# Patient Record
Sex: FEMALE | Race: WHITE | NOT HISPANIC OR LATINO
[De-identification: names, ages, dates, MRNs, and addresses within clinical notes are randomized per-mention and may not be internally consistent; named-entity substitution may affect disease eponyms.]

---

## 2017-05-16 ENCOUNTER — APPOINTMENT (OUTPATIENT)
Dept: CT IMAGING | Facility: CLINIC | Age: 77
End: 2017-05-16

## 2017-05-16 ENCOUNTER — OUTPATIENT (OUTPATIENT)
Dept: OUTPATIENT SERVICES | Facility: HOSPITAL | Age: 77
LOS: 1 days | End: 2017-05-16
Payer: MEDICARE

## 2017-05-16 DIAGNOSIS — Z00.8 ENCOUNTER FOR OTHER GENERAL EXAMINATION: ICD-10-CM

## 2017-05-16 PROCEDURE — 71250 CT THORAX DX C-: CPT

## 2017-07-31 ENCOUNTER — OUTPATIENT (OUTPATIENT)
Dept: OUTPATIENT SERVICES | Facility: HOSPITAL | Age: 77
LOS: 1 days | End: 2017-07-31
Payer: MEDICARE

## 2017-07-31 ENCOUNTER — APPOINTMENT (OUTPATIENT)
Dept: CT IMAGING | Facility: CLINIC | Age: 77
End: 2017-07-31
Payer: MEDICARE

## 2017-07-31 DIAGNOSIS — R93.8 ABNORMAL FINDINGS ON DIAGNOSTIC IMAGING OF OTHER SPECIFIED BODY STRUCTURES: ICD-10-CM

## 2017-07-31 PROCEDURE — 71250 CT THORAX DX C-: CPT

## 2017-07-31 PROCEDURE — 71250 CT THORAX DX C-: CPT | Mod: 26

## 2018-05-29 ENCOUNTER — APPOINTMENT (OUTPATIENT)
Dept: CT IMAGING | Facility: CLINIC | Age: 78
End: 2018-05-29
Payer: MEDICARE

## 2018-05-29 ENCOUNTER — OUTPATIENT (OUTPATIENT)
Dept: OUTPATIENT SERVICES | Facility: HOSPITAL | Age: 78
LOS: 1 days | End: 2018-05-29
Payer: MEDICARE

## 2018-05-29 ENCOUNTER — APPOINTMENT (OUTPATIENT)
Dept: RADIOLOGY | Facility: CLINIC | Age: 78
End: 2018-05-29
Payer: MEDICARE

## 2018-05-29 DIAGNOSIS — Z00.8 ENCOUNTER FOR OTHER GENERAL EXAMINATION: ICD-10-CM

## 2018-05-29 PROCEDURE — 71250 CT THORAX DX C-: CPT

## 2018-05-29 PROCEDURE — 71250 CT THORAX DX C-: CPT | Mod: 26

## 2018-10-24 ENCOUNTER — TRANSCRIPTION ENCOUNTER (OUTPATIENT)
Age: 78
End: 2018-10-24

## 2018-12-23 ENCOUNTER — TRANSCRIPTION ENCOUNTER (OUTPATIENT)
Age: 78
End: 2018-12-23

## 2019-01-23 ENCOUNTER — OUTPATIENT (OUTPATIENT)
Dept: OUTPATIENT SERVICES | Facility: HOSPITAL | Age: 79
LOS: 1 days | End: 2019-01-23
Payer: MEDICARE

## 2019-01-23 ENCOUNTER — APPOINTMENT (OUTPATIENT)
Dept: RADIOLOGY | Facility: IMAGING CENTER | Age: 79
End: 2019-01-23
Payer: MEDICARE

## 2019-01-23 DIAGNOSIS — Z00.8 ENCOUNTER FOR OTHER GENERAL EXAMINATION: ICD-10-CM

## 2019-01-23 PROCEDURE — 73130 X-RAY EXAM OF HAND: CPT | Mod: 26,RT

## 2019-01-23 PROCEDURE — 73130 X-RAY EXAM OF HAND: CPT

## 2019-05-24 ENCOUNTER — APPOINTMENT (OUTPATIENT)
Dept: CT IMAGING | Facility: CLINIC | Age: 79
End: 2019-05-24
Payer: MEDICARE

## 2019-05-24 ENCOUNTER — OUTPATIENT (OUTPATIENT)
Dept: OUTPATIENT SERVICES | Facility: HOSPITAL | Age: 79
LOS: 1 days | End: 2019-05-24
Payer: MEDICARE

## 2019-05-24 DIAGNOSIS — Z00.8 ENCOUNTER FOR OTHER GENERAL EXAMINATION: ICD-10-CM

## 2019-05-24 PROCEDURE — 71250 CT THORAX DX C-: CPT | Mod: 26

## 2019-05-24 PROCEDURE — 71250 CT THORAX DX C-: CPT

## 2019-07-01 ENCOUNTER — TRANSCRIPTION ENCOUNTER (OUTPATIENT)
Age: 79
End: 2019-07-01

## 2020-06-06 ENCOUNTER — OUTPATIENT (OUTPATIENT)
Dept: OUTPATIENT SERVICES | Facility: HOSPITAL | Age: 80
LOS: 1 days | End: 2020-06-06
Payer: MEDICARE

## 2020-06-06 ENCOUNTER — APPOINTMENT (OUTPATIENT)
Dept: CT IMAGING | Facility: CLINIC | Age: 80
End: 2020-06-06
Payer: MEDICARE

## 2020-06-06 DIAGNOSIS — R91.1 SOLITARY PULMONARY NODULE: ICD-10-CM

## 2020-06-06 PROCEDURE — 71250 CT THORAX DX C-: CPT | Mod: 26

## 2020-06-06 PROCEDURE — 71250 CT THORAX DX C-: CPT

## 2021-11-19 ENCOUNTER — APPOINTMENT (OUTPATIENT)
Dept: WOUND CARE | Facility: CLINIC | Age: 81
End: 2021-11-19
Payer: MEDICARE

## 2021-11-19 VITALS — TEMPERATURE: 96.1 F | BODY MASS INDEX: 26.84 KG/M2 | HEIGHT: 66 IN | WEIGHT: 167 LBS

## 2021-11-19 DIAGNOSIS — E03.9 HYPOTHYROIDISM, UNSPECIFIED: ICD-10-CM

## 2021-11-19 DIAGNOSIS — L97.512 NON-PRESSURE CHRONIC ULCER OF OTHER PART OF RIGHT FOOT WITH FAT LAYER EXPOSED: ICD-10-CM

## 2021-11-19 DIAGNOSIS — Z87.891 PERSONAL HISTORY OF NICOTINE DEPENDENCE: ICD-10-CM

## 2021-11-19 PROCEDURE — 99203 OFFICE O/P NEW LOW 30 MIN: CPT

## 2021-11-19 RX ORDER — LEVOTHYROXINE SODIUM 0.09 MG/1
88 TABLET ORAL
Refills: 0 | Status: ACTIVE | COMMUNITY

## 2021-11-19 RX ORDER — SIMVASTATIN 20 MG/1
20 TABLET, FILM COATED ORAL
Refills: 0 | Status: ACTIVE | COMMUNITY

## 2021-11-19 RX ORDER — NIFEDIPINE 60 MG/1
60 TABLET, FILM COATED, EXTENDED RELEASE ORAL
Refills: 0 | Status: ACTIVE | COMMUNITY

## 2021-11-19 RX ORDER — ASPIRIN 81 MG
81 TABLET,CHEWABLE ORAL
Refills: 0 | Status: ACTIVE | COMMUNITY

## 2021-11-19 NOTE — REASON FOR VISIT
[Consultation] : a consultation visit [Family Member] : family member [FreeTextEntry1] : Right Great Toe

## 2021-11-19 NOTE — ASSESSMENT
[FreeTextEntry1] : Wound Assessment \par The patient presents with a wound to the Right Great toe. Swelling and redness noted to the right big toe\par Ischemic appearance noted\par Patient told to continue with aquacell ag right hallux daily\par keep right foot warm\par recommend patient follow up with vascular physician for possible revascularization options\par small vessel disease and PVD\par patient not a candidate for HBO\par continue daily wound care\par high risk for gangrene and possible amputation right foot\par No debridement recommended since it could cause wound to get worse and possible infection\par No clinical sign of infection-no need for oral antibiosis- antibiotics may not reach toe quiñones to PVD\par Patient told to go directly to ED if any signs of infection or cellulitis\par return 4 weeks or PRN\par

## 2021-11-19 NOTE — HISTORY OF PRESENT ILLNESS
[FreeTextEntry1] : Ms. AGATHA COLE presents to the office with a wound for 1 year duration. The wound is located on the Right Toe Patient has hx of C.R.E.S.T and has been following Vascular  over at [facility] and Dr. Hasy for podiatry. .The patient has complaints of Pain and discomfort. The patient has been dressing the wound with Aquacel.The patient denies fevers or chills. The patient has localized pain to the wound upon dressing changes. The patient has no other complaints or associated symptoms. Ischemic appearing toe with bluish discoloration right hallux. no cap fill time noted at distal right hallux\par DP and PT non-palpable right foot +PVD right foot\par DP and PT 1/4 left foot

## 2021-11-19 NOTE — PLAN
[FreeTextEntry1] : Wound  Plan:\par \par Apply lidocaine or topical anesthetic if needed to reduce pain upon washing the wound.\par Wash wound with - Dove skin sensitive soap and clean water \par Apply ----Aquacel Ag & dsd \par Change dressing: 3 times per week.\par Encouraged ambulation or exercise as tolerated.\par Optimization of nutrition.\par Patient encourage to dangle legs to encourage blood flow to toe due to suspicions of ischemia \par Patient recommended to follow up with Vascular \par Follow up appointment scheduled for:\par

## 2023-02-02 ENCOUNTER — APPOINTMENT (OUTPATIENT)
Dept: PHYSICAL MEDICINE AND REHAB | Facility: CLINIC | Age: 83
End: 2023-02-02
Payer: MEDICARE

## 2023-02-02 VITALS
HEART RATE: 66 BPM | SYSTOLIC BLOOD PRESSURE: 110 MMHG | OXYGEN SATURATION: 99 % | TEMPERATURE: 96.5 F | DIASTOLIC BLOOD PRESSURE: 63 MMHG

## 2023-02-02 PROCEDURE — 99204 OFFICE O/P NEW MOD 45 MIN: CPT

## 2023-02-02 RX ORDER — HYDROCHLOROTHIAZIDE 12.5 MG/1
TABLET ORAL
Refills: 0 | Status: ACTIVE | COMMUNITY

## 2023-02-02 RX ORDER — OMEPRAZOLE 40 MG/1
40 CAPSULE, DELAYED RELEASE ORAL
Refills: 0 | Status: ACTIVE | COMMUNITY

## 2023-02-02 RX ORDER — SIMVASTATIN 20 MG/1
20 TABLET, FILM COATED ORAL
Refills: 0 | Status: DISCONTINUED | COMMUNITY
End: 2023-02-02

## 2023-02-02 RX ORDER — METOPROLOL SUCCINATE 25 MG/1
25 TABLET, EXTENDED RELEASE ORAL
Refills: 0 | Status: ACTIVE | COMMUNITY

## 2023-02-13 NOTE — ASSESSMENT
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  In addition the patient's silicone liners are stretched and worn and require replacement.  The patient's prosthetic socks afraid and required replacement as well.  Patient is a K2 ambulator.  Prescription provided for a right custom molded BK socket replacement with a total contact acrylic socket, flexible inner socket, test socket, suction suspension with silicone liner, sealing sleeve, ultralight alignable components, outer protective cover, 6 multiple ply socks, 6 single ply socks.  Prescription provided for outpatient physical therapy 2-3 times per week, see Rx.\par \par I spent a total of 45 minutes on the date of the encounter evaluating and treating the patient including a discussion of treatment options.

## 2023-02-13 NOTE — HISTORY OF PRESENT ILLNESS
[FreeTextEntry1] : Patient is an 82-year-old female history of CREST Syndrome, PVD status post right BKA (6/2022) secondary to PVD and presents today for a prosthetic evaluation.  Patient received her current prosthesis in September, 2022.  Patient's main complaint is that the prosthetic socket is loose/heavy resulting in instability at the knee.  Patient has noted mild rotation of the prosthesis during ambulation.  No pain complaints, no skin breakdown.  Patient has noted redness at the residual limb after ambulation.  No falls reported.  Weight stable.  Patient currently using 6 ply socks but padding has been added to the socket.  Patient is receiving outpatient physical therapy.  Premorbidly the patient was independent ambulator without assistive device, independent driving.  Currently the patient ambulates independently with a rolling walker in the community.  Patient is able to negotiate all environmental barriers such as curbs and ramps.  Patient is independent transfers, independent toileting and independent dressing.  Patient lives alone in a private house with a 2 step entry.  Home health aide is present.  Patient referred from [facility]

## 2023-02-13 NOTE — REVIEW OF SYSTEMS
[Patient Intake Form Reviewed] : Patient intake form was reviewed [Difficulty Walking] : difficulty walking [Negative] : Gastrointestinal [Hearing Loss] : loss of hearing [Fever] : no fever [Recent Change In Weight] : ~T no recent weight change [Incontinence] : no incontinence [Muscle Weakness] : no muscle weakness [Skin Wound] : no skin wound

## 2023-02-13 NOTE — PHYSICAL EXAM
[FreeTextEntry1] : General: Well-developed female in no apparent distress.  Patient is awake, alert, and follows commands.  Cooperative.\par HEENT: Normocephalic, atraumatic.  MMM.\par Lungs: Clear to auscultation.\par Cardiac: Regular rate and rhythm.\par Abdomen: Bowel sounds present, nondistended.\par Extremities: Sclerodactyly noted of the fingers, left digit 3 dressed due to recent distal amputation.  No pedal edema noted on the left.\par \par Right BKA: Cylindrical shaped, well-healed, no skin adhesion.  Hyperpigmentation noted over the anterior/distal tibia, no erythema or warmth.  No tenderness to palpation.  Full active range of motion noted at the knee.\par MTP to FE- 5 inches; MTP to BE- 4-1/4 inches.\par \par Motor:\par Both upper extremities: Tone normal, active range of motion within functional limits with 5/5 motor power throughout.  Thumb to digit opposition to digit 5 impaired bilaterally\par Both lower extremities: Tone normal active range of motion within functional limits with 5/5 motor power throughout.\par \par Sensory: Intact to light touch in both lower extremities\par Muscle stretch reflexes 0/+1 KJ and symmetric.\par \par Functional status: Sit to stand transfer independent.  Patient ambulated independently with a rolling walker and right BK prosthesis with suction suspension, 6 ply socks with significant medial thrusting noted.  Posterior padding present in the socket.  Patient is a K2 ambulator.

## 2023-05-31 ENCOUNTER — APPOINTMENT (OUTPATIENT)
Dept: PHYSICAL MEDICINE AND REHAB | Facility: CLINIC | Age: 83
End: 2023-05-31

## 2023-07-03 ENCOUNTER — APPOINTMENT (OUTPATIENT)
Dept: PHYSICAL MEDICINE AND REHAB | Facility: CLINIC | Age: 83
End: 2023-07-03
Payer: MEDICARE

## 2023-07-03 VITALS — HEART RATE: 65 BPM | OXYGEN SATURATION: 95 % | SYSTOLIC BLOOD PRESSURE: 102 MMHG | DIASTOLIC BLOOD PRESSURE: 63 MMHG

## 2023-07-03 PROCEDURE — 99213 OFFICE O/P EST LOW 20 MIN: CPT

## 2023-07-10 NOTE — HISTORY OF PRESENT ILLNESS
[FreeTextEntry1] : Patient is an 82-year-old female history of CREST Syndrome, PVD status post right BKA (6/2022) secondary to PVD and presents today for a prosthetic evaluation.  Patient had a socket replacement in February, 2023 and patient reports main complaint is that the socket has become significantly too large.  Patient complains of instability at the knee and has reported episodic rotation of the prosthesis.  Patient denies pain with ambulation or skin breakdown.  Patient has been wearing 2-3 prosthetic socks and reports that the  has revised the socket.  No falls reported.  Weight has been stable.  Functionally the patient is ambulating independently with a rolling walker in the community.  Patient is able to negotiate all environmental barriers such as curbs and ramps.  Patient is independent transfers, independent toileting and independent dressing.  Patient is working on ambulation with a straight axis cane in physical therapy.  Patient lives alone in a private house with a 2 step entry.  No longer has a home health aide.  Patient referred from [facility]

## 2023-07-10 NOTE — ASSESSMENT
[FreeTextEntry1] : Patient is an 82-year-old female history of CREST Syndrome, PVD s/p right BKA (6/2022) whose current prosthetic socket is significantly too large causing instability and episodic rotation of the prosthesis limiting ambulation.  In addition the patient's silicone liners are stretched and worn, her prosthetic socks are frayed and require replacement. The patient has advanced from K2-K3 and requires new componentry to increase stability. She is now able to navigate variable terrain with a variable quentin and is cognitively intact motivated to maintain ambulation. Patient requires a carbon fiber, energy storing, multiaxial dynamic response foot to provide increased medial and lateral stability. The patient’s current foot is unable to assist with K3-level ambulation with his current componentry. The foot should include a dynamic response toe component to allow for increased stability. She demonstrates both the physical ability and medical necessity consistent with Medicare K3 community ambulator. The patient’s prosthesis must include a combination of gel socket insert and flexible inner socket/rigid frame design prosthesis (so that the rigid frame can be fenestrated and the flexible inner socket heated and pushed through the fenestrations in the frame) to relieve these bony prominences and permit the patient to walk farther and for longer periods of time without skin breakdown or excessive pressures. Her prosthesis will require a protective cover to prevent injury to the contralateral leg. The rigid frame of the prosthesis must be constructed using ultralight materials (carbon fiber) laminated with acrylic resin.  This construction (method and materials) will provide increased strength to the external frame, as the socket will be fenestrated to reduce pressure on the residual limb.  Patient is a K3 ambulator.  Prescription provided for a right custom molded endoskeletal BK prosthesis with a total contact acrylic socket, flexible inner socket, test socket, silicone locking liner with locking mechanism, ultralight alignable components, K3 prosthetic foot, outer protective cover, 6 multiple ply socks, 6 single ply socks.  Patient to continue outpatient physical therapy.  \par \par I spent a total of 20 minutes on the date of the encounter evaluating and treating the patient including a discussion of treatment options.

## 2023-07-10 NOTE — REVIEW OF SYSTEMS
[Hearing Loss] : loss of hearing [Difficulty Walking] : difficulty walking [Negative] : Gastrointestinal [Fever] : no fever [Recent Change In Weight] : ~T no recent weight change [Incontinence] : no incontinence [Muscle Weakness] : no muscle weakness [Skin Wound] : no skin wound

## 2023-07-10 NOTE — PHYSICAL EXAM
[FreeTextEntry1] : General: Well-developed female in no apparent distress.  Patient is awake, alert, and follows commands.  Cooperative.\par HEENT: Normocephalic, atraumatic.  MMM.\par Extremities: Sclerodactyly noted of the fingers, left digit 3 distal amputation.  Left foot is dressed and patient being followed by wound care service.  Declined removal of dressing.  \par \par Right BKA: Conical shape, well-healed, no skin adhesion.  Hyperpigmentation noted over the anterior/distal tibia, no erythema or warmth.  No tenderness to palpation.  Full active range of motion noted at the knee.\par MTP to FE- 5 inches; MTP to BE- 4-1/4 inches.\par \par Motor:\par Both upper extremities: Tone normal, active range of motion within functional limits with 5/5 motor power throughout.  Thumb to digit opposition to digit 5 impaired bilaterally\par Both lower extremities: Tone normal active range of motion within functional limits with 5/5 motor power throughout.\par \par Sensory: Intact to light touch in both lower extremities\par Muscle stretch reflexes 0/+1 KJ and symmetric.\par \par Functional status: Sit to stand transfer independent.  Patient ambulated independently with a rolling walker and right BK prosthesis with silicone locking liner, 9 ply socks with min medial thrusting noted. Patient is a K3 ambulator.

## 2023-08-21 ENCOUNTER — APPOINTMENT (OUTPATIENT)
Dept: WOUND CARE | Facility: HOSPITAL | Age: 83
End: 2023-08-21
Payer: MEDICARE

## 2023-08-21 ENCOUNTER — OUTPATIENT (OUTPATIENT)
Dept: OUTPATIENT SERVICES | Facility: HOSPITAL | Age: 83
LOS: 1 days | End: 2023-08-21
Payer: MEDICARE

## 2023-08-21 VITALS
OXYGEN SATURATION: 93 % | BODY MASS INDEX: 26.52 KG/M2 | HEIGHT: 66 IN | DIASTOLIC BLOOD PRESSURE: 72 MMHG | WEIGHT: 165 LBS | HEART RATE: 60 BPM | TEMPERATURE: 97.3 F | SYSTOLIC BLOOD PRESSURE: 115 MMHG | RESPIRATION RATE: 16 BRPM

## 2023-08-21 PROCEDURE — G0463: CPT

## 2023-08-21 PROCEDURE — 99205 OFFICE O/P NEW HI 60 MIN: CPT

## 2023-08-21 PROCEDURE — 99213 OFFICE O/P EST LOW 20 MIN: CPT

## 2023-08-21 RX ORDER — COLLAGENASE SANTYL 250 [ARB'U]/G
250 OINTMENT TOPICAL DAILY
Qty: 1 | Refills: 3 | Status: ACTIVE | COMMUNITY
Start: 2023-08-21 | End: 1900-01-01

## 2023-08-21 NOTE — PHYSICAL EXAM
[0] : left 0 [Ankle Swelling (On Exam)] : present [Ankle Swelling On The Left] : moderate [Please See PDF for Tissue Analytics] : Please See PDF for Tissue Analytics. [FreeTextEntry1] : EDWARD SLAUGHTER

## 2023-08-21 NOTE — PHYSICAL EXAM
[Ankle Swelling (On Exam)] : present [Ankle Swelling On The Left] : moderate [Alert] : alert [Oriented to Person] : oriented to person [Oriented to Place] : oriented to place [Oriented to Time] : oriented to time [Calm] : calm [de-identified] : nad [de-identified] : non traumatic [de-identified] : supple [de-identified] : equal chest rise [de-identified] : soft [de-identified] :  SAMUEL, wheel chair bound  [de-identified] : Left foot wound dressed (just seen by podiatry today) [FreeTextEntry1] : Patient was evaluated for hyperbaric treatment. Patient previously evaluated in 2021 for HBO therapy and was not deemed a candidate for treatment at that time. Will need notes and pathology from referring vascular physician for diagnosis related to HBO treatment. Patient's H and P was reviewed, and patient was given an orientation of the HBO suite and treatment schedule. Patient was educated on the risks and the benefits of the treatment. Patient read and signed consent prior to the initiation of any screening procedure. Patient had the opportunity to discuss any questions regarding HBO therapy. Writer witnessed the signing of the consent, and the patient was given a copy of the signed consent. Patient had a recent chest X-ray and will send results to this office.

## 2023-08-21 NOTE — ASSESSMENT
[Dressing changes] : dressing changes [Foot Care] : foot care [Signs and symptoms of infection] : sign and symptoms of infection [Arterial Disease] : arterial disease [Hyperbaric Therapy] : hyperbaric therapy [FreeTextEntry1] : 82F with s/p L foot P1RR open from [facility] 2 weeks ago  - Pt presents with her son for evaluation of L foot wound  - Referral by Dr. Tello  - L foot open P1RR wound with fibro necrotic base with probe to bone, no erythema, no malodor, serous drainage, measures [id number] - L foot 2nd digit with delayed CFT 2/2 to PAD  - R BKA  - Pt not candidate for bypass 2/2 to no target vessels  - Recommend evaluation for Hyperbaric oxygen today (11am) - Continue local wound care with Santyl followed by 4x4, ABD pads, and hope  - Updated home nursing forms with Santyl  - RTC in 2 weeks

## 2023-08-21 NOTE — REASON FOR VISIT
[Consultation] : a consultation visit [Family Member] : family member [FreeTextEntry1] : Hyperbaric Therapy Treatments

## 2023-08-21 NOTE — HISTORY OF PRESENT ILLNESS
[FreeTextEntry1] : Patient is an 82-year-old female accompanied by her son for evaluation as a candidate for Hyperbaric Oxygen Therapy. Patient has a PMH of R BKA, scleroderma, and CREST presenting for L foot P1RR open wound. Patient was admitted to [facility] 2 weeks ago on 8/10/23, where she had an open P1RR done for gangrene of the hallux done by Dr. Naik.  Home care RN providing dressing changes three times a week.

## 2023-08-21 NOTE — REVIEW OF SYSTEMS
[Limb Swelling] : limb swelling [Skin Wound] : skin wound [Negative] : Psychiatric [FreeTextEntry6] : Equal chest rise [FreeTextEntry9] : SAMUEL Wheelchair bound [de-identified] : Left great toe amputation

## 2023-08-21 NOTE — HISTORY OF PRESENT ILLNESS
[FreeTextEntry1] : 82F with PMH of R BKA, scleroderma, and CREST presenting with her son for L foot P1RR open wound. Patient was admitted to [facility] 2 weeks ago where she had an open P1RR done for gangrene of the hallux done by Dr. Tello. Pt presents today for non healing of the L foot wound. Home nursing applies wet to dries to her L foot, pt otherwise denies any N/V/C/F/SOB.

## 2023-09-07 ENCOUNTER — APPOINTMENT (OUTPATIENT)
Dept: WOUND CARE | Facility: HOSPITAL | Age: 83
End: 2023-09-07
Payer: MEDICARE

## 2023-09-07 ENCOUNTER — APPOINTMENT (OUTPATIENT)
Dept: WOUND CARE | Facility: CLINIC | Age: 83
End: 2023-09-07

## 2023-09-07 PROCEDURE — 99213 OFFICE O/P EST LOW 20 MIN: CPT

## 2023-09-07 RX ORDER — SODIUM HYPOCHLORITE 2.5 MG/ML
0.25 SOLUTION TOPICAL
Qty: 1 | Refills: 0 | Status: ACTIVE | COMMUNITY
Start: 2023-09-07 | End: 1900-01-01

## 2023-09-07 RX ORDER — CLINDAMYCIN HYDROCHLORIDE 300 MG/1
300 CAPSULE ORAL EVERY 6 HOURS
Qty: 56 | Refills: 0 | Status: ACTIVE | COMMUNITY
Start: 2023-09-07 | End: 1900-01-01

## 2023-09-07 RX ORDER — CIPROFLOXACIN HYDROCHLORIDE 500 MG/1
500 TABLET, FILM COATED ORAL TWICE DAILY
Qty: 28 | Refills: 0 | Status: ACTIVE | COMMUNITY
Start: 2023-09-07 | End: 1900-01-01

## 2023-09-07 NOTE — HISTORY OF PRESENT ILLNESS
[FreeTextEntry1] : 82F with PMH of R BKA, scleroderma, and CREST presenting with her son for L foot P1RR open wound. Patient was admitted to [facility] 2 weeks ago where she had an open P1RR done for gangrene of the hallux done by Dr. Tello. Pt presents today for non healing of the L foot wound. Pt applies santyl to the left foot wound by VNS 3x per week. Pt was taking ABx but doesn't remember the name and finished them this past Friday.  pt otherwise denies any N/V/C/F/SOB.

## 2023-09-07 NOTE — ASSESSMENT
[Dressing changes] : dressing changes [Foot Care] : foot care [Signs and symptoms of infection] : sign and symptoms of infection [Arterial Disease] : arterial disease [Hyperbaric Therapy] : hyperbaric therapy [FreeTextEntry1] : 82F with s/p L foot P1RR open from [facility] 2 weeks ago  - Pt presents with her son for evaluation of L foot wound  - L foot open P1RR wound with fibrotic wound base with probe to bone and muscle exposed, moderate malodor, no purulent drainage, no erythema, no malodor, serous drainage - Excisional debridement of left foot wound with sterile suture removal kit to the level of subQ and not beyond subQ. Pt tolerated procedure well.  - Pt not candidate for bypass 2/2 to no target vessels  - Pt undergoing evaluation for HBOT - Left foot wound culture taken  - Rx: Clindamycin PO and Cipro PO X 14 - Dakin 0.25% solution ordered - Wound care 0.25% dakin soaked guaze followed by DSD, DAILY.  - RTC in 2 weeks

## 2023-09-10 LAB — BACTERIA SPEC CULT: ABNORMAL

## 2023-09-14 DIAGNOSIS — L97.522 NON-PRESSURE CHRONIC ULCER OF OTHER PART OF LEFT FOOT WITH FAT LAYER EXPOSED: ICD-10-CM

## 2023-09-14 DIAGNOSIS — I73.9 PERIPHERAL VASCULAR DISEASE, UNSPECIFIED: ICD-10-CM

## 2023-09-14 DIAGNOSIS — Z89.422 ACQUIRED ABSENCE OF OTHER LEFT TOE(S): ICD-10-CM

## 2023-09-14 DIAGNOSIS — S88.111D COMPLETE TRAUMATIC AMPUTATION AT LEVEL BETWEEN KNEE AND ANKLE, RIGHT LOWER LEG, SUBSEQUENT ENCOUNTER: ICD-10-CM

## 2023-09-18 ENCOUNTER — APPOINTMENT (OUTPATIENT)
Dept: WOUND CARE | Facility: HOSPITAL | Age: 83
End: 2023-09-18

## 2023-09-21 ENCOUNTER — APPOINTMENT (OUTPATIENT)
Dept: WOUND CARE | Facility: CLINIC | Age: 83
End: 2023-09-21
Payer: MEDICARE

## 2023-09-21 ENCOUNTER — OUTPATIENT (OUTPATIENT)
Dept: OUTPATIENT SERVICES | Facility: HOSPITAL | Age: 83
LOS: 1 days | End: 2023-09-21
Payer: MEDICARE

## 2023-09-21 PROCEDURE — 11042 DBRDMT SUBQ TIS 1ST 20SQCM/<: CPT

## 2023-09-22 DIAGNOSIS — M34.1 CR(E)ST SYNDROME: ICD-10-CM

## 2023-09-22 DIAGNOSIS — L97.522 NON-PRESSURE CHRONIC ULCER OF OTHER PART OF LEFT FOOT WITH FAT LAYER EXPOSED: ICD-10-CM

## 2023-09-22 DIAGNOSIS — I73.9 PERIPHERAL VASCULAR DISEASE, UNSPECIFIED: ICD-10-CM

## 2023-09-22 DIAGNOSIS — Z89.422 ACQUIRED ABSENCE OF OTHER LEFT TOE(S): ICD-10-CM

## 2023-10-02 ENCOUNTER — APPOINTMENT (OUTPATIENT)
Dept: WOUND CARE | Facility: HOSPITAL | Age: 83
End: 2023-10-02
Payer: MEDICARE

## 2023-10-02 ENCOUNTER — OUTPATIENT (OUTPATIENT)
Dept: OUTPATIENT SERVICES | Facility: HOSPITAL | Age: 83
LOS: 1 days | End: 2023-10-02
Payer: MEDICARE

## 2023-10-02 PROCEDURE — 99213 OFFICE O/P EST LOW 20 MIN: CPT

## 2023-10-02 PROCEDURE — G0463: CPT

## 2023-10-04 RX ORDER — COLLAGENASE SANTYL 250 [ARB'U]/G
250 OINTMENT TOPICAL DAILY
Qty: 1 | Refills: 3 | Status: ACTIVE | COMMUNITY
Start: 2023-10-02 | End: 1900-01-01

## 2023-10-05 ENCOUNTER — NON-APPOINTMENT (OUTPATIENT)
Age: 83
End: 2023-10-05

## 2023-10-23 ENCOUNTER — APPOINTMENT (OUTPATIENT)
Dept: WOUND CARE | Facility: HOSPITAL | Age: 83
End: 2023-10-23
Payer: MEDICARE

## 2023-10-23 ENCOUNTER — OUTPATIENT (OUTPATIENT)
Dept: OUTPATIENT SERVICES | Facility: HOSPITAL | Age: 83
LOS: 1 days | End: 2023-10-23
Payer: MEDICARE

## 2023-10-23 VITALS — TEMPERATURE: 97.4 F | WEIGHT: 165 LBS | BODY MASS INDEX: 26.52 KG/M2 | HEIGHT: 66 IN

## 2023-10-23 PROCEDURE — 11042 DBRDMT SUBQ TIS 1ST 20SQCM/<: CPT

## 2023-10-30 DIAGNOSIS — L97.522 NON-PRESSURE CHRONIC ULCER OF OTHER PART OF LEFT FOOT WITH FAT LAYER EXPOSED: ICD-10-CM

## 2023-10-30 DIAGNOSIS — I73.9 PERIPHERAL VASCULAR DISEASE, UNSPECIFIED: ICD-10-CM

## 2023-10-30 DIAGNOSIS — M34.1 CR(E)ST SYNDROME: ICD-10-CM

## 2023-10-30 DIAGNOSIS — S88.111D COMPLETE TRAUMATIC AMPUTATION AT LEVEL BETWEEN KNEE AND ANKLE, RIGHT LOWER LEG, SUBSEQUENT ENCOUNTER: ICD-10-CM

## 2023-10-30 DIAGNOSIS — Z89.422 ACQUIRED ABSENCE OF OTHER LEFT TOE(S): ICD-10-CM

## 2023-11-06 ENCOUNTER — OUTPATIENT (OUTPATIENT)
Dept: OUTPATIENT SERVICES | Facility: HOSPITAL | Age: 83
LOS: 1 days | End: 2023-11-06
Payer: MEDICARE

## 2023-11-06 ENCOUNTER — APPOINTMENT (OUTPATIENT)
Dept: WOUND CARE | Facility: HOSPITAL | Age: 83
End: 2023-11-06
Payer: MEDICARE

## 2023-11-06 VITALS — HEIGHT: 66 IN | WEIGHT: 165 LBS | TEMPERATURE: 97.6 F | BODY MASS INDEX: 26.52 KG/M2

## 2023-11-06 PROCEDURE — 11042 DBRDMT SUBQ TIS 1ST 20SQCM/<: CPT

## 2023-11-08 DIAGNOSIS — S88.111D COMPLETE TRAUMATIC AMPUTATION AT LEVEL BETWEEN KNEE AND ANKLE, RIGHT LOWER LEG, SUBSEQUENT ENCOUNTER: ICD-10-CM

## 2023-11-08 DIAGNOSIS — L97.522 NON-PRESSURE CHRONIC ULCER OF OTHER PART OF LEFT FOOT WITH FAT LAYER EXPOSED: ICD-10-CM

## 2023-11-08 DIAGNOSIS — Z89.422 ACQUIRED ABSENCE OF OTHER LEFT TOE(S): ICD-10-CM

## 2023-11-08 DIAGNOSIS — M34.1 CR(E)ST SYNDROME: ICD-10-CM

## 2023-11-13 DIAGNOSIS — I73.9 PERIPHERAL VASCULAR DISEASE, UNSPECIFIED: ICD-10-CM

## 2023-11-13 DIAGNOSIS — L97.522 NON-PRESSURE CHRONIC ULCER OF OTHER PART OF LEFT FOOT WITH FAT LAYER EXPOSED: ICD-10-CM

## 2023-11-13 DIAGNOSIS — Z89.422 ACQUIRED ABSENCE OF OTHER LEFT TOE(S): ICD-10-CM

## 2023-11-13 DIAGNOSIS — S88.111D COMPLETE TRAUMATIC AMPUTATION AT LEVEL BETWEEN KNEE AND ANKLE, RIGHT LOWER LEG, SUBSEQUENT ENCOUNTER: ICD-10-CM

## 2023-11-13 DIAGNOSIS — M34.1 CR(E)ST SYNDROME: ICD-10-CM

## 2023-11-20 ENCOUNTER — APPOINTMENT (OUTPATIENT)
Dept: WOUND CARE | Facility: HOSPITAL | Age: 83
End: 2023-11-20
Payer: MEDICARE

## 2023-11-20 ENCOUNTER — OUTPATIENT (OUTPATIENT)
Dept: OUTPATIENT SERVICES | Facility: HOSPITAL | Age: 83
LOS: 1 days | End: 2023-11-20
Payer: MEDICARE

## 2023-11-20 VITALS — RESPIRATION RATE: 16 BRPM | TEMPERATURE: 98 F | HEIGHT: 66 IN | WEIGHT: 165 LBS | BODY MASS INDEX: 26.52 KG/M2

## 2023-11-20 PROCEDURE — 11042 DBRDMT SUBQ TIS 1ST 20SQCM/<: CPT

## 2023-11-30 DIAGNOSIS — M34.1 CR(E)ST SYNDROME: ICD-10-CM

## 2023-11-30 DIAGNOSIS — I73.9 PERIPHERAL VASCULAR DISEASE, UNSPECIFIED: ICD-10-CM

## 2023-11-30 DIAGNOSIS — L97.522 NON-PRESSURE CHRONIC ULCER OF OTHER PART OF LEFT FOOT WITH FAT LAYER EXPOSED: ICD-10-CM

## 2023-12-04 ENCOUNTER — OUTPATIENT (OUTPATIENT)
Dept: OUTPATIENT SERVICES | Facility: HOSPITAL | Age: 83
LOS: 1 days | End: 2023-12-04
Payer: MEDICARE

## 2023-12-04 ENCOUNTER — APPOINTMENT (OUTPATIENT)
Dept: WOUND CARE | Facility: HOSPITAL | Age: 83
End: 2023-12-04
Payer: MEDICARE

## 2023-12-04 VITALS
BODY MASS INDEX: 26.52 KG/M2 | DIASTOLIC BLOOD PRESSURE: 68 MMHG | OXYGEN SATURATION: 95 % | TEMPERATURE: 98 F | HEIGHT: 66 IN | RESPIRATION RATE: 16 BRPM | SYSTOLIC BLOOD PRESSURE: 116 MMHG | WEIGHT: 165 LBS | HEART RATE: 62 BPM

## 2023-12-04 PROCEDURE — 11042 DBRDMT SUBQ TIS 1ST 20SQCM/<: CPT

## 2023-12-12 DIAGNOSIS — L97.522 NON-PRESSURE CHRONIC ULCER OF OTHER PART OF LEFT FOOT WITH FAT LAYER EXPOSED: ICD-10-CM

## 2023-12-12 DIAGNOSIS — Z89.422 ACQUIRED ABSENCE OF OTHER LEFT TOE(S): ICD-10-CM

## 2023-12-12 DIAGNOSIS — M34.1 CR(E)ST SYNDROME: ICD-10-CM

## 2023-12-12 DIAGNOSIS — I73.9 PERIPHERAL VASCULAR DISEASE, UNSPECIFIED: ICD-10-CM

## 2023-12-18 ENCOUNTER — OUTPATIENT (OUTPATIENT)
Dept: OUTPATIENT SERVICES | Facility: HOSPITAL | Age: 83
LOS: 1 days | End: 2023-12-18
Payer: MEDICARE

## 2023-12-18 ENCOUNTER — APPOINTMENT (OUTPATIENT)
Dept: WOUND CARE | Facility: HOSPITAL | Age: 83
End: 2023-12-18
Payer: MEDICARE

## 2023-12-18 VITALS
DIASTOLIC BLOOD PRESSURE: 80 MMHG | WEIGHT: 165 LBS | BODY MASS INDEX: 26.52 KG/M2 | TEMPERATURE: 97.4 F | SYSTOLIC BLOOD PRESSURE: 128 MMHG | OXYGEN SATURATION: 97 % | RESPIRATION RATE: 16 BRPM | HEART RATE: 68 BPM | HEIGHT: 66 IN

## 2023-12-18 PROCEDURE — 11042 DBRDMT SUBQ TIS 1ST 20SQCM/<: CPT

## 2023-12-18 NOTE — ASSESSMENT
[Dressing changes] : dressing changes [Foot Care] : foot care [Signs and symptoms of infection] : sign and symptoms of infection [Arterial Disease] : arterial disease [Hyperbaric Therapy] : hyperbaric therapy

## 2024-01-22 ENCOUNTER — APPOINTMENT (OUTPATIENT)
Dept: WOUND CARE | Facility: HOSPITAL | Age: 84
End: 2024-01-22
Payer: MEDICARE

## 2024-01-22 ENCOUNTER — OUTPATIENT (OUTPATIENT)
Dept: OUTPATIENT SERVICES | Facility: HOSPITAL | Age: 84
LOS: 1 days | End: 2024-01-22
Payer: MEDICARE

## 2024-01-22 PROCEDURE — G0463: CPT

## 2024-01-22 PROCEDURE — 99213 OFFICE O/P EST LOW 20 MIN: CPT

## 2024-01-22 NOTE — PLAN
[FreeTextEntry1] : 83F with s/p L foot P1RR open from [facility]  - Pt presents with her son for evaluation of L foot wound - L foot open P1RR wound with fibrogranular wound base , bone exposed, no malodor, no purulent drainage, no erythema, full thickness eschar with gangrenous changes  to the L dorsal distal 2nd digit, mild wet conversion with no malodor or erythema noted, fibortic changes to the dorsal 3rd digit wound, no drainage. New dorsal 4th digit wound to subQ, fibrotic wound bed.  - L foot wound debrided in excisional manner using sterile suture removal kit to the level of subQ and not beyond, pt tolerated procedure well at this time  - Cleansed wound with chlorhexidine and applied santyl followed by DSD. - Pt to continue oxygen boot therapy  - Recommend cleaning with acetic acid, rinsing with saline and applying Santyl daily.  - Given chronicity of the left foot wound and non healing nature of the wound. now with spread to the 3rd/4th digit digit as well, patient scheduled an appt w Dr. Henry @ [facility] tomorrow.  - Advised patient that she will benefit from L foot 2nd digit amputation with a dermal substitute to cover the wound area however, patient should discuss with the vascular Dr whether he wants to intervene surgically or whether we would schedule an oupt procedure for L foot 2nd digit amputation with graft application.  - RTC in 3 weeks

## 2024-01-22 NOTE — HISTORY OF PRESENT ILLNESS
[FreeTextEntry1] : 82F with PMH of R BKA, scleroderma, and CREST presenting with her son for L foot P1RR open wound (DOS Aug 2023).  Pt is currently on Amplodipine and was taken off the metoprolol by her cardiologist. Pt has been doing TW O2 at home 5 days per week. She was unable to get an appt at the wound care center 2/2 to holidays. pt otherwise denies any N/V/C/F/SOB.

## 2024-01-24 DIAGNOSIS — L97.522 NON-PRESSURE CHRONIC ULCER OF OTHER PART OF LEFT FOOT WITH FAT LAYER EXPOSED: ICD-10-CM

## 2024-01-24 DIAGNOSIS — Z89.422 ACQUIRED ABSENCE OF OTHER LEFT TOE(S): ICD-10-CM

## 2024-01-24 DIAGNOSIS — I73.9 PERIPHERAL VASCULAR DISEASE, UNSPECIFIED: ICD-10-CM

## 2024-01-24 DIAGNOSIS — M34.1 CR(E)ST SYNDROME: ICD-10-CM

## 2024-01-24 DIAGNOSIS — S88.111D COMPLETE TRAUMATIC AMPUTATION AT LEVEL BETWEEN KNEE AND ANKLE, RIGHT LOWER LEG, SUBSEQUENT ENCOUNTER: ICD-10-CM

## 2024-02-12 ENCOUNTER — APPOINTMENT (OUTPATIENT)
Dept: WOUND CARE | Facility: HOSPITAL | Age: 84
End: 2024-02-12
Payer: MEDICARE

## 2024-02-12 ENCOUNTER — OUTPATIENT (OUTPATIENT)
Dept: OUTPATIENT SERVICES | Facility: HOSPITAL | Age: 84
LOS: 1 days | End: 2024-02-12
Payer: MEDICARE

## 2024-02-12 PROCEDURE — 11044 DBRDMT BONE 1ST 20 SQ CM/<: CPT

## 2024-02-12 NOTE — PLAN
[FreeTextEntry1] : 83F with s/p L foot P1RR open from [facility]  - Pt presents with her son for evaluation of L foot wound - L foot open P1RR wound with fibrogranular wound base , bone exposed, no malodor, no purulent drainage, no erythema, full thickness eschar with gangrenous changes  to the L dorsal distal 2nd digit, mild wet conversion with no malodor or erythema noted, fibortic changes to the dorsal 3rd digit wound, no drainage. New dorsal 4th digit wound to subQ, fibrotic wound bed.  - L foot wound debrided in excisional manner using sterile suture removal kit to the level of subQ and not beyond, pt tolerated procedure well at this time  - Cleansed wound with chlorhexidine and applied santyl followed by DSD. - Pt to continue oxygen boot therapy on 03/1 - Recommend cleaning with acetic acid, rinsing with saline and applying Santyl daily.  - Advised patient that she will benefit from L foot 2nd digit amputation with a dermal substitute to cover the wound area however,  we would schedule an oupt procedure for L foot 2nd digit amputation with graft application.  - RTC PRN

## 2024-02-13 NOTE — PLAN
[FreeTextEntry1] : 83F with s/p L foot P1RR open from [facility]  - Pt presents with her son for evaluation of L foot wound - L foot open P1RR wound with fibrotic wound base with macerated edges, bone and muscle exposed, mild malodor, no purulent drainage, no erythema, full thickness eschar with gangrenous changes  to the L dorsal 2nd digit with well adhered edges, now with ischemic changes to the 3rd digit  - Consent obtained, L foot blocked with 5cc of 1% lidocaine plain in gottlieb block - L foot wound debrided in excisional manner using sterile suture removal kit to the level of subQ and not beyond, pt tolerated procedure well at this time  - Cleansed wound with vashe and applied santyl followed by DSD. - Pt to continue oxygen boot therapy  - Recommend cleaning with acetic acid, rinsing with saline and applying Santyl daily.  - Recommended L foot TMA due to the chronicity and non healing nature of the wound. now with spread to the 3rd digit as well, will contact Dr. Henry @ [facility]. Son is in agreement and will discuss plan with the patient.  - RTC in 3 weeks with possible admission to [facility].

## 2024-02-13 NOTE — HISTORY OF PRESENT ILLNESS
[FreeTextEntry1] : 82F with PMH of R BKA, scleroderma, and CREST presenting with her son for L foot P1RR open wound (DOS Aug 2023). Seen by her rheumatologist who is considering putting her on nifedipine for her Raynaud's but there is concern by her cardiologist who wants to keep her on the metoprolol, patient has appointment with her cardiologist on Thursday. Pt has been doing TW O2 at home 5 days per week. pt otherwise denies any N/V/C/F/SOB.

## 2024-02-20 DIAGNOSIS — I73.9 PERIPHERAL VASCULAR DISEASE, UNSPECIFIED: ICD-10-CM

## 2024-02-20 DIAGNOSIS — Z89.422 ACQUIRED ABSENCE OF OTHER LEFT TOE(S): ICD-10-CM

## 2024-02-20 DIAGNOSIS — M34.1 CR(E)ST SYNDROME: ICD-10-CM

## 2024-02-20 DIAGNOSIS — S88.111D COMPLETE TRAUMATIC AMPUTATION AT LEVEL BETWEEN KNEE AND ANKLE, RIGHT LOWER LEG, SUBSEQUENT ENCOUNTER: ICD-10-CM

## 2024-02-20 DIAGNOSIS — L97.522 NON-PRESSURE CHRONIC ULCER OF OTHER PART OF LEFT FOOT WITH FAT LAYER EXPOSED: ICD-10-CM

## 2024-02-21 LAB — CORE LAB BIOPSY: NORMAL

## 2024-02-23 ENCOUNTER — OUTPATIENT (OUTPATIENT)
Dept: OUTPATIENT SERVICES | Facility: HOSPITAL | Age: 84
LOS: 1 days | End: 2024-02-23

## 2024-02-23 VITALS
HEIGHT: 64 IN | RESPIRATION RATE: 18 BRPM | OXYGEN SATURATION: 98 % | WEIGHT: 145.06 LBS | TEMPERATURE: 98 F | DIASTOLIC BLOOD PRESSURE: 57 MMHG | SYSTOLIC BLOOD PRESSURE: 106 MMHG | HEART RATE: 69 BPM

## 2024-02-23 DIAGNOSIS — I96 GANGRENE, NOT ELSEWHERE CLASSIFIED: ICD-10-CM

## 2024-02-23 DIAGNOSIS — Z89.511 ACQUIRED ABSENCE OF RIGHT LEG BELOW KNEE: Chronic | ICD-10-CM

## 2024-02-23 DIAGNOSIS — Z98.890 OTHER SPECIFIED POSTPROCEDURAL STATES: Chronic | ICD-10-CM

## 2024-02-23 DIAGNOSIS — L97.524 NON-PRESSURE CHRONIC ULCER OF OTHER PART OF LEFT FOOT WITH NECROSIS OF BONE: ICD-10-CM

## 2024-02-23 DIAGNOSIS — Z89.421 ACQUIRED ABSENCE OF OTHER RIGHT TOE(S): Chronic | ICD-10-CM

## 2024-02-23 NOTE — H&P PST ADULT - NEUROLOGICAL SYMPTOMS
Uses a walker and wheelchair for transport- unsteady due to BKA and left foot wound./difficulty walking

## 2024-02-23 NOTE — H&P PST ADULT - HISTORY OF PRESENT ILLNESS
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 Pt is scheduled for Amputation of left foot 2nd digit with dermal skin application.  Patient has private RN who does daily left foot dressings. Denies foot pain, cp, sob, fevers.

## 2024-02-23 NOTE — H&P PST ADULT - NEUROLOGICAL
normal/sensation intact/responds to pain/responds to verbal commands/cranial nerves intact/no spontaneous movement details…

## 2024-02-23 NOTE — H&P PST ADULT - SKIN
Patient refused examination of left foot wound- dressing on Dry and intact./warm and dry/color normal/normal/no rashes W Plasty Text: The lesion was extirpated to the level of the fat with a #15 scalpel blade.  Given the location of the defect, shape of the defect and the proximity to free margins a W-plasty was deemed most appropriate for repair.  Using a sterile surgical marker, the appropriate transposition arms of the W-plasty were drawn incorporating the defect and placing the expected incisions within the relaxed skin tension lines where possible.    The area thus outlined was incised deep to adipose tissue with a #15 scalpel blade.  The skin margins were undermined to an appropriate distance in all directions utilizing iris scissors.  The opposing transposition arms were then transposed into place in opposite direction and anchored with interrupted buried subcutaneous sutures.

## 2024-02-23 NOTE — H&P PST ADULT - PROBLEM SELECTOR PLAN 1
Patient tentatively scheduled for  Amputation of left foot 2nd digit with dermal skin application on 3/5/24.  Pre-op instructions provided. Pt given verbal and written instructions with teach back on chlorhexidine shampoo. Pt verbalized understanding with return demonstration.   Patient instructed to take Omeprazole with a sip of water on the morning of procedure.     Copy of Labs-CBC, CMP in chart.  Copy of baseline EKG requested from Cardiologist.

## 2024-02-23 NOTE — H&P PST ADULT - MUSCULOSKELETAL
Hx of Right bka with prosthetic on. Uses a walker in the house and wheelchair for transport./no joint swelling/no joint erythema/no joint warmth/strength 5/5 bilateral upper extremities/abnormal gait details…

## 2024-02-23 NOTE — H&P PST ADULT - NEGATIVE ENMT SYMPTOMS
no hearing difficulty/no ear pain/no tinnitus/no vertigo/no sinus symptoms/no nasal congestion/no nose bleeds/no abnormal taste sensation/no gum bleeding/no dry mouth/no throat pain/no dysphagia no ear pain/no tinnitus/no vertigo/no sinus symptoms/no nasal congestion/no nose bleeds/no abnormal taste sensation/no gum bleeding/no dry mouth/no throat pain/no dysphagia

## 2024-02-23 NOTE — H&P PST ADULT - NSICDXPASTSURGICALHX_GEN_ALL_CORE_FT
PAST SURGICAL HISTORY:  H/O colonoscopy     S/P BKA (below knee amputation), right     S/P endoscopy     Status post amputation of toe of right foot

## 2024-02-23 NOTE — H&P PST ADULT - FUNCTIONAL STATUS
METS 3.3- DASI- patient has limited activities due to Hx of Right BKA and left foot wound. Uses walker and able to walk indoors with assistance. Uses wheelchair for transport./less than 4 METS METS 3.30- DASI- patient has limited activities due to Hx of Right BKA and left foot wound. Uses walker and able to walk indoors with assistance. Uses wheelchair for transport./less than 4 METS

## 2024-02-23 NOTE — H&P PST ADULT - NSICDXPASTMEDICALHX_GEN_ALL_CORE_FT
PAST MEDICAL HISTORY:  Chronic foot ulcer     Gangrene, not elsewhere classified     GIB (gastrointestinal bleeding)     History of CREST syndrome     HLD (hyperlipidemia)     [place] (hard of hearing)     Hypertension     Non-pressure chronic ulcer of other part of left foot with necrosis of bone     PVD (peripheral vascular disease)     Raynaud disease     Seasonal allergies

## 2024-02-23 NOTE — H&P PST ADULT - BLOOD AVOIDANCE/RESTRICTIONS, PROFILE
Problem: AMI: Day 3  Goal: Off Pathway (Use only if patient is Off Pathway)  Outcome: Progressing Towards Goal  Echo today none

## 2024-03-04 ENCOUNTER — TRANSCRIPTION ENCOUNTER (OUTPATIENT)
Age: 84
End: 2024-03-04

## 2024-03-05 ENCOUNTER — OUTPATIENT (OUTPATIENT)
Dept: OUTPATIENT SERVICES | Facility: HOSPITAL | Age: 84
LOS: 1 days | Discharge: HOME | End: 2024-03-05
Payer: MEDICARE

## 2024-03-05 ENCOUNTER — RESULT REVIEW (OUTPATIENT)
Age: 84
End: 2024-03-05

## 2024-03-05 ENCOUNTER — TRANSCRIPTION ENCOUNTER (OUTPATIENT)
Age: 84
End: 2024-03-05

## 2024-03-05 VITALS
OXYGEN SATURATION: 99 % | SYSTOLIC BLOOD PRESSURE: 115 MMHG | DIASTOLIC BLOOD PRESSURE: 65 MMHG | RESPIRATION RATE: 20 BRPM | TEMPERATURE: 98 F | HEIGHT: 64 IN | WEIGHT: 145.51 LBS | HEART RATE: 75 BPM

## 2024-03-05 VITALS
HEART RATE: 62 BPM | SYSTOLIC BLOOD PRESSURE: 102 MMHG | OXYGEN SATURATION: 98 % | RESPIRATION RATE: 20 BRPM | TEMPERATURE: 98 F | DIASTOLIC BLOOD PRESSURE: 58 MMHG

## 2024-03-05 DIAGNOSIS — Z98.890 OTHER SPECIFIED POSTPROCEDURAL STATES: Chronic | ICD-10-CM

## 2024-03-05 DIAGNOSIS — Z89.421 ACQUIRED ABSENCE OF OTHER RIGHT TOE(S): Chronic | ICD-10-CM

## 2024-03-05 DIAGNOSIS — L97.524 NON-PRESSURE CHRONIC ULCER OF OTHER PART OF LEFT FOOT WITH NECROSIS OF BONE: ICD-10-CM

## 2024-03-05 DIAGNOSIS — Z89.511 ACQUIRED ABSENCE OF RIGHT LEG BELOW KNEE: Chronic | ICD-10-CM

## 2024-03-05 PROBLEM — L97.509 NON-PRESSURE CHRONIC ULCER OF OTHER PART OF UNSPECIFIED FOOT WITH UNSPECIFIED SEVERITY: Chronic | Status: ACTIVE | Noted: 2024-02-23

## 2024-03-05 PROBLEM — I73.9 PERIPHERAL VASCULAR DISEASE, UNSPECIFIED: Chronic | Status: ACTIVE | Noted: 2024-02-23

## 2024-03-05 PROCEDURE — 88311 DECALCIFY TISSUE: CPT | Mod: 26

## 2024-03-05 PROCEDURE — 88305 TISSUE EXAM BY PATHOLOGIST: CPT | Mod: 26

## 2024-03-05 PROCEDURE — 73630 X-RAY EXAM OF FOOT: CPT | Mod: 26,LT

## 2024-03-05 PROCEDURE — 88304 TISSUE EXAM BY PATHOLOGIST: CPT | Mod: 26

## 2024-03-05 RX ORDER — AMLODIPINE BESYLATE 2.5 MG/1
1 TABLET ORAL
Refills: 0 | DISCHARGE

## 2024-03-05 RX ORDER — ACETAMINOPHEN AND CODEINE PHOSPHATE 300; 30 MG/1; MG/1
300-30 TABLET ORAL
Qty: 20 | Refills: 0 | Status: ACTIVE | OUTPATIENT
Start: 2024-03-05

## 2024-03-05 RX ORDER — FOLIC ACID 0.8 MG
1 TABLET ORAL
Refills: 0 | DISCHARGE

## 2024-03-05 RX ORDER — SIMVASTATIN 20 MG/1
1 TABLET, FILM COATED ORAL
Refills: 0 | DISCHARGE

## 2024-03-05 RX ORDER — OMEPRAZOLE 10 MG/1
1 CAPSULE, DELAYED RELEASE ORAL
Refills: 0 | DISCHARGE

## 2024-03-05 RX ORDER — FERROUS SULFATE 325(65) MG
0 TABLET ORAL
Refills: 0 | DISCHARGE

## 2024-03-05 RX ORDER — LEVOTHYROXINE SODIUM 125 MCG
1 TABLET ORAL
Refills: 0 | DISCHARGE

## 2024-03-05 RX ORDER — HYDROXYCHLOROQUINE SULFATE 200 MG
1 TABLET ORAL
Refills: 0 | DISCHARGE

## 2024-03-05 NOTE — ASU DISCHARGE PLAN (ADULT/PEDIATRIC) - NURSING INSTRUCTIONS
Progress to regular diet as tolerated.  Keep well hydrated.     Next dose of Tylenol may be taken at 3:30pm

## 2024-03-05 NOTE — ASU DISCHARGE PLAN (ADULT/PEDIATRIC) - CARE PROVIDER_API CALL
Pauol Hope  Podiatric Medicine and Surgery  [address], [address]  [place] [address]  Phone: [phone]  Fax: [phone]  Follow Up Time: 1 week

## 2024-03-06 PROBLEM — K92.2 GASTROINTESTINAL HEMORRHAGE, UNSPECIFIED: Chronic | Status: ACTIVE | Noted: 2024-02-23

## 2024-03-06 PROBLEM — I10 ESSENTIAL (PRIMARY) HYPERTENSION: Chronic | Status: ACTIVE | Noted: 2024-02-23

## 2024-03-06 PROBLEM — J30.2 OTHER SEASONAL ALLERGIC RHINITIS: Chronic | Status: ACTIVE | Noted: 2024-02-23

## 2024-03-06 PROBLEM — H91.90 UNSPECIFIED HEARING LOSS, UNSPECIFIED EAR: Chronic | Status: ACTIVE | Noted: 2024-02-23

## 2024-03-06 PROBLEM — I73.00 RAYNAUD'S SYNDROME WITHOUT GANGRENE: Chronic | Status: ACTIVE | Noted: 2024-02-23

## 2024-03-06 PROBLEM — I96 GANGRENE, NOT ELSEWHERE CLASSIFIED: Chronic | Status: ACTIVE | Noted: 2024-02-23

## 2024-03-11 ENCOUNTER — OUTPATIENT (OUTPATIENT)
Dept: OUTPATIENT SERVICES | Facility: HOSPITAL | Age: 84
LOS: 1 days | End: 2024-03-11
Payer: MEDICARE

## 2024-03-11 ENCOUNTER — APPOINTMENT (OUTPATIENT)
Dept: WOUND CARE | Facility: HOSPITAL | Age: 84
End: 2024-03-11
Payer: MEDICARE

## 2024-03-11 VITALS
HEIGHT: 66 IN | TEMPERATURE: 97.7 F | SYSTOLIC BLOOD PRESSURE: 109 MMHG | BODY MASS INDEX: 25.71 KG/M2 | DIASTOLIC BLOOD PRESSURE: 59 MMHG | HEART RATE: 74 BPM | WEIGHT: 160 LBS | RESPIRATION RATE: 16 BRPM

## 2024-03-11 DIAGNOSIS — Z98.890 OTHER SPECIFIED POSTPROCEDURAL STATES: Chronic | ICD-10-CM

## 2024-03-11 DIAGNOSIS — Z89.511 ACQUIRED ABSENCE OF RIGHT LEG BELOW KNEE: Chronic | ICD-10-CM

## 2024-03-11 DIAGNOSIS — Z89.421 ACQUIRED ABSENCE OF OTHER RIGHT TOE(S): Chronic | ICD-10-CM

## 2024-03-11 PROBLEM — Z87.39 PERSONAL HISTORY OF OTHER DISEASES OF THE MUSCULOSKELETAL SYSTEM AND CONNECTIVE TISSUE: Chronic | Status: ACTIVE | Noted: 2024-02-23

## 2024-03-11 PROBLEM — E78.5 HYPERLIPIDEMIA, UNSPECIFIED: Chronic | Status: ACTIVE | Noted: 2024-02-23

## 2024-03-11 PROCEDURE — G0463: CPT

## 2024-03-11 PROCEDURE — 99213 OFFICE O/P EST LOW 20 MIN: CPT | Mod: 24

## 2024-03-11 RX ORDER — CIPROFLOXACIN HYDROCHLORIDE 500 MG/1
500 TABLET, FILM COATED ORAL TWICE DAILY
Qty: 28 | Refills: 0 | Status: ACTIVE | OUTPATIENT
Start: 2024-03-11

## 2024-03-11 NOTE — ASSESSMENT
[Dressing changes] : dressing changes [Signs and symptoms of infection] : sign and symptoms of infection [Foot Care] : foot care [Hyperbaric Therapy] : hyperbaric therapy [Arterial Disease] : arterial disease

## 2024-03-13 LAB — SURGICAL PATHOLOGY STUDY: SIGNIFICANT CHANGE UP

## 2024-03-16 LAB — BACTERIA SPEC CULT: ABNORMAL

## 2024-03-18 ENCOUNTER — APPOINTMENT (OUTPATIENT)
Dept: WOUND CARE | Facility: HOSPITAL | Age: 84
End: 2024-03-18
Payer: MEDICARE

## 2024-03-18 ENCOUNTER — OUTPATIENT (OUTPATIENT)
Dept: OUTPATIENT SERVICES | Facility: HOSPITAL | Age: 84
LOS: 1 days | End: 2024-03-18
Payer: MEDICARE

## 2024-03-18 DIAGNOSIS — Z98.890 OTHER SPECIFIED POSTPROCEDURAL STATES: Chronic | ICD-10-CM

## 2024-03-18 DIAGNOSIS — Z89.511 ACQUIRED ABSENCE OF RIGHT LEG BELOW KNEE: Chronic | ICD-10-CM

## 2024-03-18 DIAGNOSIS — Z89.421 ACQUIRED ABSENCE OF OTHER RIGHT TOE(S): Chronic | ICD-10-CM

## 2024-03-18 PROCEDURE — G0463: CPT

## 2024-03-18 PROCEDURE — 99213 OFFICE O/P EST LOW 20 MIN: CPT | Mod: 24

## 2024-03-18 NOTE — ASSESSMENT
[Dressing changes] : dressing changes [Signs and symptoms of infection] : sign and symptoms of infection [Foot Care] : foot care [Arterial Disease] : arterial disease [Hyperbaric Therapy] : hyperbaric therapy

## 2024-03-22 NOTE — PLAN
[FreeTextEntry1] : 83F with s/p L foot P1RR open from [facility] and 3/5 s/p L foot 2nd digit amputation with  somagen graft francisca - 3/5 s/p left foot 2nd digit amputation with somagen graft application:   - Applied new adaptic layer, followed by aquacel, acetic acid and DSD - Pt to continue oxygen boot therapy  - WCN : acetic acid, and applying aquacel followed by DSD.  - Continue PO Cipro BID, rec'd referral to ID doc Romain Wilson ID to assist -  The patient may need graft take down if no improvement - RTC in 1 week

## 2024-03-25 ENCOUNTER — OUTPATIENT (OUTPATIENT)
Dept: OUTPATIENT SERVICES | Facility: HOSPITAL | Age: 84
LOS: 1 days | End: 2024-03-25
Payer: MEDICARE

## 2024-03-25 ENCOUNTER — APPOINTMENT (OUTPATIENT)
Dept: WOUND CARE | Facility: HOSPITAL | Age: 84
End: 2024-03-25
Payer: MEDICARE

## 2024-03-25 VITALS
SYSTOLIC BLOOD PRESSURE: 105 MMHG | OXYGEN SATURATION: 95 % | HEART RATE: 70 BPM | RESPIRATION RATE: 16 BRPM | DIASTOLIC BLOOD PRESSURE: 58 MMHG

## 2024-03-25 DIAGNOSIS — Z89.421 ACQUIRED ABSENCE OF OTHER RIGHT TOE(S): Chronic | ICD-10-CM

## 2024-03-25 DIAGNOSIS — Z89.511 ACQUIRED ABSENCE OF RIGHT LEG BELOW KNEE: Chronic | ICD-10-CM

## 2024-03-25 DIAGNOSIS — Z98.890 OTHER SPECIFIED POSTPROCEDURAL STATES: Chronic | ICD-10-CM

## 2024-03-25 PROCEDURE — G0463: CPT

## 2024-03-25 PROCEDURE — 99213 OFFICE O/P EST LOW 20 MIN: CPT | Mod: 24

## 2024-03-25 NOTE — ASSESSMENT
[Dressing changes] : dressing changes [Foot Care] : foot care [Signs and symptoms of infection] : sign and symptoms of infection [Hyperbaric Therapy] : hyperbaric therapy [Arterial Disease] : arterial disease

## 2024-03-25 NOTE — PLAN
[FreeTextEntry1] : 83F with s/p L foot P1RR open from [facility] and 3/5 s/p L foot 2nd digit amputation with  somagen graft francisca - Pt presents with her son for evaluation of L foot wound - 3/5 s/p left foot 2nd digit amputation with somagen graft application:  graft, staples adaptic layer intact, mild serosanguinous drainage, mild malodor, no erythema, no edema, greenish discharge noted on dressing - Removal of adaptic covering layer, staples remained intact, with slight periwound debridement; pt tolerated procedure well at this time  - Left foot wound cultured - Applied new adaptic layer, followed by aquacel, acetic acid and DSD - Pt to continue oxygen boot therapy  - WCN ordered inputted: acetic acid, and applying aquacel followed by DSD.  - Rx Cipro 500 mg x 2 weeks  - RTC in 1 week

## 2024-03-28 NOTE — PHYSICAL EXAM
[Ankle Swelling (On Exam)] : present [0] : left 0 [Ankle Swelling On The Left] : of the left ankle [Please See PDF for Tissue Analytics] : Please See PDF for [employer] Analytics. [de-identified] : NAD [FreeTextEntry1] : EDWARD SLAUGHTER

## 2024-03-28 NOTE — HISTORY OF PRESENT ILLNESS
[FreeTextEntry1] : 83F with PMH of R BKA, scleroderma, and CREST presenting with her son for L foot P1RR open wound (DOS Aug 2023).  Pt is currently on Amplodipine and was taken off the metoprolol by her cardiologist. Pt has been doing TW O2 at home 5 days per week. She was unable to get an appt at the wound care center 2/2 to holidays. Patinet is s/p L foot P1RR from [facility], and 3/5 s/p0 L foto 2nd digit amputation with somagen graft application. Pt is still taking the PO cipro as per ID.  Pt has appointment with ID tomorrow at 8AM.  pt otherwise denies any N/V/C/F/SOB.

## 2024-03-28 NOTE — PLAN
[FreeTextEntry1] : 83F with s/p L foot P1RR open from [facility] and 3/5 s/p L foot 2nd digit amputation with  somagen graft francisca - 3/5 s/p left foot 2nd digit amputation with somagen graft application:   - L foot graft site with malodor, fibrous tissue, clear exudate, graft still appears viable at this time, no clinical signs of infection.  - Dressed with adaptic, 4x4 gauze, ABD, and light hope,  - Continue PO Cipro BID, has an appointment tomorrow with Romain Wilson ID  - RTC in 1 week

## 2024-04-01 ENCOUNTER — OUTPATIENT (OUTPATIENT)
Dept: OUTPATIENT SERVICES | Facility: HOSPITAL | Age: 84
LOS: 1 days | End: 2024-04-01
Payer: MEDICARE

## 2024-04-01 ENCOUNTER — APPOINTMENT (OUTPATIENT)
Dept: WOUND CARE | Facility: HOSPITAL | Age: 84
End: 2024-04-01
Payer: MEDICARE

## 2024-04-01 DIAGNOSIS — Z89.511 ACQUIRED ABSENCE OF RIGHT LEG BELOW KNEE: Chronic | ICD-10-CM

## 2024-04-01 DIAGNOSIS — M34.1 CR(E)ST SYNDROME: ICD-10-CM

## 2024-04-01 DIAGNOSIS — Z98.890 OTHER SPECIFIED POSTPROCEDURAL STATES: Chronic | ICD-10-CM

## 2024-04-01 DIAGNOSIS — Z89.422 ACQUIRED ABSENCE OF OTHER LEFT TOE(S): ICD-10-CM

## 2024-04-01 DIAGNOSIS — L97.522 NON-PRESSURE CHRONIC ULCER OF OTHER PART OF LEFT FOOT WITH FAT LAYER EXPOSED: ICD-10-CM

## 2024-04-01 PROCEDURE — 99213 OFFICE O/P EST LOW 20 MIN: CPT | Mod: 24

## 2024-04-01 PROCEDURE — G0463: CPT

## 2024-04-01 NOTE — ASSESSMENT
[Foot Care] : foot care [Dressing changes] : dressing changes [Signs and symptoms of infection] : sign and symptoms of infection [Arterial Disease] : arterial disease [Hyperbaric Therapy] : hyperbaric therapy

## 2024-04-02 DIAGNOSIS — I73.9 PERIPHERAL VASCULAR DISEASE, UNSPECIFIED: ICD-10-CM

## 2024-04-02 DIAGNOSIS — L97.522 NON-PRESSURE CHRONIC ULCER OF OTHER PART OF LEFT FOOT WITH FAT LAYER EXPOSED: ICD-10-CM

## 2024-04-02 DIAGNOSIS — M34.1 CR(E)ST SYNDROME: ICD-10-CM

## 2024-04-02 DIAGNOSIS — S88.111D COMPLETE TRAUMATIC AMPUTATION AT LEVEL BETWEEN KNEE AND ANKLE, RIGHT LOWER LEG, SUBSEQUENT ENCOUNTER: ICD-10-CM

## 2024-04-02 DIAGNOSIS — Z89.422 ACQUIRED ABSENCE OF OTHER LEFT TOE(S): ICD-10-CM

## 2024-04-04 NOTE — PHYSICAL EXAM
[0] : left 0 [Ankle Swelling (On Exam)] : present [Ankle Swelling On The Left] : of the left ankle [Please See PDF for Tissue Analytics] : Please See PDF for [employer] Analytics. [de-identified] : NAD [FreeTextEntry1] : EDWARD SLAUGHTER

## 2024-04-04 NOTE — HISTORY OF PRESENT ILLNESS
[FreeTextEntry1] : 83F with PMH of R BKA, scleroderma, and CREST presenting with her son for L foot P1RR open wound (DOS Aug 2023).  Pt has been doing TW O2 at home 5 days per week.  Patient is s/p L foot 2nd digit amputation with somagen graft application. Pt was seen by Dr. Valente who changed her to [facility] for residual osteomyelitis of the 2nd met + margin.  Pt. denies any N/V/C/F/D/SOB.

## 2024-04-04 NOTE — PLAN
[FreeTextEntry1] : 83F with s/p L foot P1RR open from [facility] and 3/5 s/p L foot 2nd digit amputation with  somagen graft francisca - 3/5 s/p left foot 2nd digit amputation with somagen graft application, graft site with mild malodor, graft not taken by wound centrally and peripherally with some mild take proximally, periwound sloughing and no signs of infection - using sterile suture removal kit, the graft was removed and slough was debrided  - Dressed with aquacell, 4x4 gauze, and light hope,  - continue Levofloxacin - RTC 2 weeks

## 2024-04-08 ENCOUNTER — OUTPATIENT (OUTPATIENT)
Dept: OUTPATIENT SERVICES | Facility: HOSPITAL | Age: 84
LOS: 1 days | End: 2024-04-08
Payer: MEDICARE

## 2024-04-08 ENCOUNTER — APPOINTMENT (OUTPATIENT)
Dept: WOUND CARE | Facility: HOSPITAL | Age: 84
End: 2024-04-08
Payer: MEDICARE

## 2024-04-08 VITALS — SYSTOLIC BLOOD PRESSURE: 123 MMHG | DIASTOLIC BLOOD PRESSURE: 68 MMHG | TEMPERATURE: 97.7 F | RESPIRATION RATE: 18 BRPM

## 2024-04-08 DIAGNOSIS — Z98.890 OTHER SPECIFIED POSTPROCEDURAL STATES: Chronic | ICD-10-CM

## 2024-04-08 DIAGNOSIS — Z89.421 ACQUIRED ABSENCE OF OTHER RIGHT TOE(S): Chronic | ICD-10-CM

## 2024-04-08 DIAGNOSIS — Z89.511 ACQUIRED ABSENCE OF RIGHT LEG BELOW KNEE: Chronic | ICD-10-CM

## 2024-04-08 PROCEDURE — G0463: CPT

## 2024-04-08 PROCEDURE — 99213 OFFICE O/P EST LOW 20 MIN: CPT | Mod: 24

## 2024-04-12 NOTE — PHYSICAL EXAM
[0] : left 0 [Ankle Swelling (On Exam)] : present [Ankle Swelling On The Left] : moderate [Please See PDF for Tissue Analytics] : Please See PDF for [employer] Analytics. [de-identified] : NAD [FreeTextEntry1] : EDWARD SLAUGHTER

## 2024-04-12 NOTE — PLAN
[FreeTextEntry1] : 83F with s/p L foot P1RR open from [facility] and 3/5 s/p L foot 2nd digit amputation with  somagen graft francisca - 3/5 s/p left foot 2nd digit amputation with somagen graft application, graft site with mild malodor, graft not taken by wound centrally and peripherally with some mild take proximally, periwound sloughing and no signs of infection - Using sterile suture removal kit and curette, excisional debridement to the level of subq and not beyond  - Dressed with aquacel, 4x4 gauze, and light hope,  - C/w Levofloxacin - RTC 2 weeks

## 2024-04-12 NOTE — HISTORY OF PRESENT ILLNESS
[FreeTextEntry1] : 83F with PMH of R BKA, scleroderma, and CREST presenting with her son for L foot P1RR open wound (DOS Aug 2023).  Pt has been doing TW O2 at home daily for 2hours per week.  Patient is s/p L foot 2nd digit amputation with somagen graft application. Pt was seen by Dr. Valente who changed her to Lavaquin for residual osteomyelitis of the 2nd met + margin.pt still talking the levofloxacin. Will be seeingDr Valente 4/25. Pt. denies any N/V/C/F/D/SOB.

## 2024-04-22 ENCOUNTER — APPOINTMENT (OUTPATIENT)
Dept: WOUND CARE | Facility: HOSPITAL | Age: 84
End: 2024-04-22
Payer: MEDICARE

## 2024-04-22 ENCOUNTER — OUTPATIENT (OUTPATIENT)
Dept: OUTPATIENT SERVICES | Facility: HOSPITAL | Age: 84
LOS: 1 days | End: 2024-04-22
Payer: MEDICARE

## 2024-04-22 DIAGNOSIS — Z98.890 OTHER SPECIFIED POSTPROCEDURAL STATES: Chronic | ICD-10-CM

## 2024-04-22 DIAGNOSIS — Z89.511 ACQUIRED ABSENCE OF RIGHT LEG BELOW KNEE: Chronic | ICD-10-CM

## 2024-04-22 DIAGNOSIS — Z89.421 ACQUIRED ABSENCE OF OTHER RIGHT TOE(S): Chronic | ICD-10-CM

## 2024-04-22 PROCEDURE — G0463: CPT

## 2024-04-22 PROCEDURE — 99213 OFFICE O/P EST LOW 20 MIN: CPT

## 2024-04-22 NOTE — PHYSICAL EXAM
[0] : left 0 [Ankle Swelling (On Exam)] : present [Ankle Swelling On The Left] : moderate [Please See PDF for Tissue Analytics] : Please See PDF for [employer] Analytics. [de-identified] : NAD [FreeTextEntry1] : EDWARD SLAUGHTER

## 2024-04-22 NOTE — HISTORY OF PRESENT ILLNESS
[FreeTextEntry1] : 83F with PMH of R BKA, scleroderma, and CREST presenting with her daughter today for L foot P1RR open wound (DOS Aug 2023).  Pt has been doing TW O2 at home daily for 2hours per week.  Patient is s/p L foot 2nd digit amputation with somagen graft application. Pt was seen by Dr. Valente who changed her to Lavaquin for residual osteomyelitis of the 2nd met + margin.pt still talking the levofloxacin. Will be seeing Dr Valente 4/25. Topical O2 stopped last Wednesday. Pt has been taking her antibiotics as instructed  Pt. denies any N/V/C/F/D/SOB.

## 2024-04-22 NOTE — PLAN
[FreeTextEntry1] : 83F with s/p L foot P1RR open from [facility] and 3/5 s/p L foot 2nd digit amputation with  somagen graft francisca - 3/5 s/p left foot 2nd digit amputation with Somagen graft application, graft site with mild malodor, graft not taken by wound centrally and peripherally with some mild take proximally, periwound sloughing and no signs of infection - L foot scrubbed with chlorohexidine brush  - Topical lidocaine 5% applied to the L foot, Using sterile suture removal kit and curette, excisional debridement to the level of subq and not beyond  - Dressed with aquacel, 4x4 gauze, and light hope,  - Instructed patient and her daughter to change dressings 2x a day to minimize drainage to other digits - C/w Levofloxacin as instructed by ID - RTC 2x a week

## 2024-04-29 DIAGNOSIS — I73.9 PERIPHERAL VASCULAR DISEASE, UNSPECIFIED: ICD-10-CM

## 2024-04-29 DIAGNOSIS — L97.522 NON-PRESSURE CHRONIC ULCER OF OTHER PART OF LEFT FOOT WITH FAT LAYER EXPOSED: ICD-10-CM

## 2024-04-29 DIAGNOSIS — Z89.422 ACQUIRED ABSENCE OF OTHER LEFT TOE(S): ICD-10-CM

## 2024-04-29 DIAGNOSIS — M34.1 CR(E)ST SYNDROME: ICD-10-CM

## 2024-04-29 DIAGNOSIS — S88.111D COMPLETE TRAUMATIC AMPUTATION AT LEVEL BETWEEN KNEE AND ANKLE, RIGHT LOWER LEG, SUBSEQUENT ENCOUNTER: ICD-10-CM

## 2024-05-06 ENCOUNTER — LABORATORY RESULT (OUTPATIENT)
Age: 84
End: 2024-05-06

## 2024-05-06 ENCOUNTER — APPOINTMENT (OUTPATIENT)
Dept: WOUND CARE | Facility: HOSPITAL | Age: 84
End: 2024-05-06
Payer: MEDICARE

## 2024-05-06 ENCOUNTER — OUTPATIENT (OUTPATIENT)
Dept: OUTPATIENT SERVICES | Facility: HOSPITAL | Age: 84
LOS: 1 days | End: 2024-05-06
Payer: MEDICARE

## 2024-05-06 DIAGNOSIS — Z89.511 ACQUIRED ABSENCE OF RIGHT LEG BELOW KNEE: Chronic | ICD-10-CM

## 2024-05-06 DIAGNOSIS — Z98.890 OTHER SPECIFIED POSTPROCEDURAL STATES: Chronic | ICD-10-CM

## 2024-05-06 DIAGNOSIS — Z89.421 ACQUIRED ABSENCE OF OTHER RIGHT TOE(S): Chronic | ICD-10-CM

## 2024-05-06 PROCEDURE — 99213 OFFICE O/P EST LOW 20 MIN: CPT

## 2024-05-06 PROCEDURE — G0463: CPT

## 2024-05-10 ENCOUNTER — APPOINTMENT (OUTPATIENT)
Dept: RADIOLOGY | Facility: CLINIC | Age: 84
End: 2024-05-10
Payer: MEDICARE

## 2024-05-10 PROCEDURE — 73630 X-RAY EXAM OF FOOT: CPT | Mod: LT

## 2024-05-13 NOTE — HISTORY OF PRESENT ILLNESS
[FreeTextEntry1] : 83F with PMH of R BKA, scleroderma, and CREST presenting with her daughter today for L foot P1RR open wound (DOS Aug 2023).  Pt has been doing TW O2 at home daily for 2hours per week.  Patient is s/p L foot 2nd digit amputation with somagen graft application. Pt was seen by Dr. Valente who discontinued Levaquin for residual osteomyelitis of the 2nd met + margin.pt still talking the levofloxacin. Will be seeing Dr Valente 4/25. Topical O2 stopped. Pt has been taking her antibiotics as instructed by Dr YANNICK Hernandez. Pt presents with new complaint of plantar forefoot pain for the past week. denies any N/V/C/F/D/SOB.

## 2024-05-13 NOTE — PLAN
[FreeTextEntry1] : 83F with s/p L foot P1RR open from [facility] and 3/5 s/p L foot 2nd digit amputation with  somagen graft francisca - 3/5 s/p left foot 2nd digit amputation with Somagen graft application, graft site with mild malodor, graft not taken by wound centrally and peripherally with some mild take proximally, periwound sloughing and no signs of infection - L foot  post surgical wound clinically improving - New plantar sub metatarsal 2 wound to subQ with fibrous wound bed likely 2/2 to pressure  - Topical lidocaine 5% applied to the L foot, Using sterile suture removal kit and curette, excisional debridement to the level of subq and not beyond  - Both wounds dressed with santyl followed by DSD - Instructed patient and son to change dressings 2x a day to minimize drainage to other digits - Dr. Valente discontinued Levofloxacin - Ordered ESR/CRP and left foot xrays to be completed before next visit - RTC 2x a week

## 2024-05-13 NOTE — PHYSICAL EXAM
[0] : left 0 [Ankle Swelling (On Exam)] : present [Ankle Swelling On The Left] : moderate [Please See PDF for Tissue Analytics] : Please See PDF for [employer] Analytics. [de-identified] : NAD [FreeTextEntry1] : EDWARD SLAUGHTER

## 2024-05-14 ENCOUNTER — APPOINTMENT (OUTPATIENT)
Dept: PHYSICAL MEDICINE AND REHAB | Facility: CLINIC | Age: 84
End: 2024-05-14
Payer: MEDICARE

## 2024-05-14 PROCEDURE — 99213 OFFICE O/P EST LOW 20 MIN: CPT

## 2024-05-14 NOTE — HISTORY OF PRESENT ILLNESS
[FreeTextEntry1] : Patient is an 83-year-old female history of CREST Syndrome, PVD status post right BKA (6/2022) secondary to PVD and presents today for a prosthetic evaluation.  Patient was last seen July 3, 2023 and received her current right BKA prosthesis towards the end of July.  Patient's main complaint is that the prosthetic socket is large causing instability during ambulation and coursing her prosthesis to strike her contralateral knee during ambulation.  Patient is lost approximately 5 to 10 pounds since last visit, no pain complaints at the residual limb, no skin breakdown.  No phantom pain.  Patient is wearing 3 prosthetic socks (approximately 7 ply).  No falls reported however her ambulation has been significantly limited due to having a left first and second toe amputation approximately 4 to 5 months ago.  Patient is currently in a wound care service.  Patient is able to ambulate short distances in the home with a rolling walker independently.   Patient lives alone in a private house with a 2 step entry.  Home health aide is present.

## 2024-05-14 NOTE — REVIEW OF SYSTEMS
[Hearing Loss] : loss of hearing [Difficulty Walking] : difficulty walking [Negative] : Gastrointestinal [Recent Change In Weight] : ~T recent weight change [Skin Wound] : skin wound [Fever] : no fever [Incontinence] : no incontinence [Muscle Weakness] : no muscle weakness [de-identified] : Left foot, in wound care service.

## 2024-05-14 NOTE — PHYSICAL EXAM
[FreeTextEntry1] : General: Well-developed female in no apparent distress.  Patient is awake, alert, and follows commands.  Cooperative. HEENT: Normocephalic, atraumatic.  MMM. Extremities: Sclerodactyly noted of the fingers, left digit 3 dressed due to recent distal amputation.  Left foot is dressed and patient in a wound care service.  Right BKA: Conical shaped, well-healed, no skin adhesion.  Hyperpigmentation noted over the anterior/distal tibia, no erythema or warmth.  No tenderness to palpation.  Full active range of motion noted at the knee.  Genu valgum noted. MTP to FE- 5 inches; MTP to BE- 4-1/4 inches.  Motor: Both upper extremities: Tone normal, active range of motion within functional limits with 5/5 motor power throughout.  Thumb to digit opposition to digit 5 impaired bilaterally Both lower extremities: Tone normal active range of motion within functional limits with 5/5 motor power throughout.  Sensory: Intact to light touch in both lower extremities Muscle stretch reflexes 0/+1 KJ and symmetric.  Functional status: Sit to stand transfer independent.  Patient ambulated independently with a rolling walker and right BK prosthesis with silicone locking liner, 7 ply socks with medial thrusting noted.  Patient is a K2 ambulator.

## 2024-05-14 NOTE — ASSESSMENT
[FreeTextEntry1] : Patient is an 83-year-old female history of CREST Syndrome, PVD s/p right BKA (6/2022) whose current prosthetic socket is significantly too large causing instability limiting ambulation.  In addition the patient's silicone liners are stretched and worn and require replacement.  The patient's prosthetic socks are frayed and required replacement as well.  Patient is a K2 ambulator. The patient's prosthesis must include a combination of gel socket insert and flexible inner socket/rigid frame design prosthesis (so that the rigid frame can be fenestrated and the flexible inner socket heated and pushed through the fenestrations in the frame) to relieve these bony prominences and permit the patient to walk farther and for longer periods of time without skin breakdown or excessive pressures. Her prosthesis will require a protective cover to prevent injury to the contralateral leg. The rigid frame of the prosthesis must be constructed using ultralight materials (carbon fiber) laminated with acrylic resin. This construction (method and materials) will provide increased strength to the external frame, as the socket will be fenestrated to reduce pressure on the residual limb. Prescription provided for a right custom molded BK socket replacement with a total contact acrylic socket, flexible inner socket, test socket, silicone locking liner with locking mechanism, ultralight alignable components, outer protective cover, 6 multiple ply socks, 6 single ply socks.  Patient to restart outpatient physical therapy once wound care service clears her.  I spent a total of 20 minutes on the date of the encounter evaluating and treating the patient including a discussion of treatment options.

## 2024-05-16 DIAGNOSIS — L97.522 NON-PRESSURE CHRONIC ULCER OF OTHER PART OF LEFT FOOT WITH FAT LAYER EXPOSED: ICD-10-CM

## 2024-05-16 DIAGNOSIS — I73.9 PERIPHERAL VASCULAR DISEASE, UNSPECIFIED: ICD-10-CM

## 2024-05-16 DIAGNOSIS — S88.111D COMPLETE TRAUMATIC AMPUTATION AT LEVEL BETWEEN KNEE AND ANKLE, RIGHT LOWER LEG, SUBSEQUENT ENCOUNTER: ICD-10-CM

## 2024-05-16 DIAGNOSIS — M34.1 CR(E)ST SYNDROME: ICD-10-CM

## 2024-05-20 ENCOUNTER — APPOINTMENT (OUTPATIENT)
Dept: WOUND CARE | Facility: HOSPITAL | Age: 84
End: 2024-05-20
Payer: MEDICARE

## 2024-05-20 VITALS
DIASTOLIC BLOOD PRESSURE: 61 MMHG | SYSTOLIC BLOOD PRESSURE: 117 MMHG | HEART RATE: 61 BPM | TEMPERATURE: 97.9 F | RESPIRATION RATE: 18 BRPM

## 2024-05-20 PROCEDURE — 99213 OFFICE O/P EST LOW 20 MIN: CPT

## 2024-05-29 DIAGNOSIS — M34.1 CR(E)ST SYNDROME: ICD-10-CM

## 2024-05-29 DIAGNOSIS — L97.522 NON-PRESSURE CHRONIC ULCER OF OTHER PART OF LEFT FOOT WITH FAT LAYER EXPOSED: ICD-10-CM

## 2024-05-29 DIAGNOSIS — I73.9 PERIPHERAL VASCULAR DISEASE, UNSPECIFIED: ICD-10-CM

## 2024-05-29 DIAGNOSIS — S88.111D COMPLETE TRAUMATIC AMPUTATION AT LEVEL BETWEEN KNEE AND ANKLE, RIGHT LOWER LEG, SUBSEQUENT ENCOUNTER: ICD-10-CM

## 2024-06-03 ENCOUNTER — OUTPATIENT (OUTPATIENT)
Dept: OUTPATIENT SERVICES | Facility: HOSPITAL | Age: 84
LOS: 1 days | End: 2024-06-03

## 2024-06-03 ENCOUNTER — APPOINTMENT (OUTPATIENT)
Dept: WOUND CARE | Facility: HOSPITAL | Age: 84
End: 2024-06-03

## 2024-06-03 DIAGNOSIS — Z89.421 ACQUIRED ABSENCE OF OTHER RIGHT TOE(S): Chronic | ICD-10-CM

## 2024-06-03 DIAGNOSIS — Z98.890 OTHER SPECIFIED POSTPROCEDURAL STATES: Chronic | ICD-10-CM

## 2024-06-03 DIAGNOSIS — Z89.511 ACQUIRED ABSENCE OF RIGHT LEG BELOW KNEE: Chronic | ICD-10-CM

## 2024-06-03 PROCEDURE — 99213 OFFICE O/P EST LOW 20 MIN: CPT

## 2024-06-03 NOTE — REVIEW OF SYSTEMS
[As Noted in HPI] : as noted in HPI [Negative] : Constitutional Mastoid Interpolation Flap Text: A decision was made to reconstruct the defect utilizing an interpolation axial flap and a staged reconstruction.  A telfa template was made of the defect.  This telfa template was then used to outline the mastoid interpolation flap.  The donor area for the pedicle flap was then injected with anesthesia.  The flap was excised through the skin and subcutaneous tissue down to the layer of the underlying musculature.  The pedicle flap was carefully excised within this deep plane to maintain its blood supply.  The edges of the donor site were undermined.   The donor site was closed in a primary fashion.  The pedicle was then rotated into position and sutured.  Once the tube was sutured into place, adequate blood supply was confirmed with blanching and refill.  The pedicle was then wrapped with xeroform gauze and dressed appropriately with a telfa and gauze bandage to ensure continued blood supply and protect the attached pedicle.

## 2024-06-03 NOTE — HISTORY OF PRESENT ILLNESS
[FreeTextEntry1] : 83F with PMH of R BKA, scleroderma, and CREST presenting with her daughter today for L foot P1RR open wound (DOS Aug 2023).  Pt has been doing TW O2 at home daily for 2hours per week.  Patient is s/p L foot 2nd digit amputation with somagen graft application. Pt was seen by Dr. Valente who discontinued Levaquin for residual osteomyelitis of the 2nd met + margin.pt still talking the levofloxacin. Will be seeing Dr Valente 4/25. Topical O2 stopped. Pt has stopped taking her antibiotics as instructed by Dr YANNICK Hernandez. denies any N/V/C/F/D/SOB.   5/6 ESR: 49, CRP : 8mg/dL, WBC 7.56

## 2024-06-03 NOTE — PLAN
[FreeTextEntry1] : 83F with s/p L foot P1RR open from [facility] and 3/5 s/p L foot 2nd digit amputation with  somagen graft francisca - 3/5 s/p left foot 2nd digit amputation with Somagen graft application, graft site with mild malodor, graft not taken by wound centrally and peripherally with some mild take proximally, periwound sloughing, sub second metatarsal head wound to subq and no signs of infection - L foot  post surgical wound clinically improving  - Topical lidocaine applied to the L foot, Using sterile #15 blade and curette, excisional debridement to the level of subQ and not beyond  - Both wounds dressed with santyl followed by DSD - Instructed patient and son to change dressings 2x a day to minimize drainage to other digits - Labs and Xrays reviewed - RTC 3 weeks

## 2024-06-03 NOTE — PHYSICAL EXAM
[0] : left 0 [Ankle Swelling (On Exam)] : present [Ankle Swelling On The Left] : moderate [Please See PDF for Tissue Analytics] : Please See PDF for [employer] Analytics. [de-identified] : NAD [FreeTextEntry1] : EDWARD SLAUGHTER

## 2024-06-07 NOTE — HISTORY OF PRESENT ILLNESS
[FreeTextEntry1] : 83F with PMH of R BKA, scleroderma, and CREST presenting with her daughter today for L foot P1RR open wound (DOS Aug 2023).  Pt has been doing TW O2 at home daily for 2hours per week.  Patient is s/p L foot 2nd digit amputation with somagen graft application. Pt was seen by Dr. Valente who discontinued Levaquin for residual osteomyelitis of the 2nd met + margin.pt still talking the levofloxacin. Will be seeing Dr Valente 4/25. Topical O2 stopped. Pt has been taking her antibiotics as instructed by Dr YANNICK Hernandez. Pt presents with new complaint of plantar forefoot pain for the past week. denies any N/V/C/F/D/SOB.   5/6 ESR: 49, CRP : 8mg/dL, WBC 7.56

## 2024-06-07 NOTE — PHYSICAL EXAM
[0] : left 0 [Ankle Swelling (On Exam)] : present [Ankle Swelling On The Left] : moderate [Please See PDF for Tissue Analytics] : Please See PDF for [employer] Analytics. [de-identified] : NAD [FreeTextEntry1] : EDWARD SLAUGHTER

## 2024-06-07 NOTE — PLAN
[FreeTextEntry1] : 83F with s/p L foot P1RR open from [facility] and 3/5 s/p L foot 2nd digit amputation with  somagen graft francisca - 3/5 s/p left foot 2nd digit amputation with Somagen graft application, graft site with mild malodor, graft not taken by wound centrally and peripherally with some mild take proximally, periwound sloughing and no signs of infection - L foot  post surgical wound clinically improving - New plantar sub metatarsal 2 wound to subQ with fibrous wound bed likely 2/2 to pressure  - Topical benzocaine applied to the L foot, Using sterile #15 blade and curette, excisional debridement to the level of subQ and not beyond  - Both wounds dressed with santyl followed by DSD - Instructed patient and son to change dressings 2x a day to minimize drainage to other digits - Dr. Valente discontinued Levofloxacin - Labs and Xrays reviewed - RTC 2 week

## 2024-06-24 ENCOUNTER — APPOINTMENT (OUTPATIENT)
Dept: WOUND CARE | Facility: HOSPITAL | Age: 84
End: 2024-06-24

## 2024-06-24 DIAGNOSIS — S88.111D COMPLETE TRAUMATIC AMPUTATION AT LVL BETWEEN KNEE AND ANKLE, RIGHT LOWER LEG, SUBSEQUENT ENCOUNTER: ICD-10-CM

## 2024-06-24 DIAGNOSIS — L97.522 NON-PRESSURE CHRONIC ULCER OF OTHER PART OF LEFT FOOT WITH FAT LAYER EXPOSED: ICD-10-CM

## 2024-06-24 DIAGNOSIS — Z89.422 ACQUIRED ABSENCE OF OTHER LEFT TOE(S): ICD-10-CM

## 2024-06-24 DIAGNOSIS — I73.9 PERIPHERAL VASCULAR DISEASE, UNSPECIFIED: ICD-10-CM

## 2024-06-24 DIAGNOSIS — M34.1 CR(E)ST SYNDROME: ICD-10-CM

## 2024-06-24 PROCEDURE — 11042 DBRDMT SUBQ TIS 1ST 20SQCM/<: CPT

## 2024-07-03 ENCOUNTER — APPOINTMENT (OUTPATIENT)
Dept: PHYSICAL MEDICINE AND REHAB | Facility: CLINIC | Age: 84
End: 2024-07-03
Payer: MEDICARE

## 2024-07-03 VITALS — SYSTOLIC BLOOD PRESSURE: 104 MMHG | DIASTOLIC BLOOD PRESSURE: 66 MMHG

## 2024-07-03 DIAGNOSIS — S88.111D COMPLETE TRAUMATIC AMPUTATION AT LVL BETWEEN KNEE AND ANKLE, RIGHT LOWER LEG, SUBSEQUENT ENCOUNTER: ICD-10-CM

## 2024-07-03 PROCEDURE — 99212 OFFICE O/P EST SF 10 MIN: CPT

## 2024-07-15 ENCOUNTER — OUTPATIENT (OUTPATIENT)
Dept: OUTPATIENT SERVICES | Facility: HOSPITAL | Age: 84
LOS: 1 days | End: 2024-07-15
Payer: MEDICARE

## 2024-07-15 ENCOUNTER — APPOINTMENT (OUTPATIENT)
Dept: WOUND CARE | Facility: HOSPITAL | Age: 84
End: 2024-07-15

## 2024-07-15 DIAGNOSIS — I73.9 PERIPHERAL VASCULAR DISEASE, UNSPECIFIED: ICD-10-CM

## 2024-07-15 DIAGNOSIS — Z89.511 ACQUIRED ABSENCE OF RIGHT LEG BELOW KNEE: Chronic | ICD-10-CM

## 2024-07-15 DIAGNOSIS — Z98.890 OTHER SPECIFIED POSTPROCEDURAL STATES: Chronic | ICD-10-CM

## 2024-07-15 DIAGNOSIS — L97.522 NON-PRESSURE CHRONIC ULCER OF OTHER PART OF LEFT FOOT WITH FAT LAYER EXPOSED: ICD-10-CM

## 2024-07-15 DIAGNOSIS — M34.1 CR(E)ST SYNDROME: ICD-10-CM

## 2024-07-15 DIAGNOSIS — Z89.421 ACQUIRED ABSENCE OF OTHER RIGHT TOE(S): Chronic | ICD-10-CM

## 2024-07-15 PROCEDURE — 11042 DBRDMT SUBQ TIS 1ST 20SQCM/<: CPT

## 2024-07-15 RX ORDER — COLLAGENASE SANTYL 250 [ARB'U]/G
250 OINTMENT TOPICAL DAILY
Qty: 1 | Refills: 3 | Status: ACTIVE | COMMUNITY
Start: 2024-07-15 | End: 1900-01-01

## 2024-07-30 DIAGNOSIS — L97.522 NON-PRESSURE CHRONIC ULCER OF OTHER PART OF LEFT FOOT WITH FAT LAYER EXPOSED: ICD-10-CM

## 2024-07-30 DIAGNOSIS — M34.1 CR(E)ST SYNDROME: ICD-10-CM

## 2024-07-30 DIAGNOSIS — I73.9 PERIPHERAL VASCULAR DISEASE, UNSPECIFIED: ICD-10-CM

## 2024-08-05 ENCOUNTER — OUTPATIENT (OUTPATIENT)
Dept: OUTPATIENT SERVICES | Facility: HOSPITAL | Age: 84
LOS: 1 days | End: 2024-08-05
Payer: MEDICARE

## 2024-08-05 ENCOUNTER — APPOINTMENT (OUTPATIENT)
Dept: WOUND CARE | Facility: HOSPITAL | Age: 84
End: 2024-08-05

## 2024-08-05 DIAGNOSIS — Z89.421 ACQUIRED ABSENCE OF OTHER RIGHT TOE(S): Chronic | ICD-10-CM

## 2024-08-05 DIAGNOSIS — Z89.511 ACQUIRED ABSENCE OF RIGHT LEG BELOW KNEE: Chronic | ICD-10-CM

## 2024-08-05 DIAGNOSIS — Z98.890 OTHER SPECIFIED POSTPROCEDURAL STATES: Chronic | ICD-10-CM

## 2024-08-05 PROCEDURE — 11042 DBRDMT SUBQ TIS 1ST 20SQCM/<: CPT

## 2024-08-09 NOTE — PLAN
[FreeTextEntry1] : 83F with s/p L foot P1RR open from [facility] and 3/5 s/p L foot 2nd digit amputation with  somagen graft francisca - 3/5 s/p left foot 2nd digit amputation with Somagen graft application, graft site with mild malodor, graft not taken by wound centrally and peripherally with some mild take proximally, periwound sloughing, sub second metatarsal head wound to subq and no signs of infection - L foot post surgical wound changes with increase drainage plantarly at the 3rd MPJ  - Using sterile suture removal kit and curette, excisional debridement to the level of subQ and not beyond  - Wounds dressed with santyl followed by DSD - Instructed patient and son to change dressings 2x a day to minimize drainage to other digits - discussed with patient's son and patient the potential need for TMA - RTC 3 weeks

## 2024-08-09 NOTE — PHYSICAL EXAM
[0] : left 0 [Ankle Swelling (On Exam)] : present [Ankle Swelling On The Left] : moderate [Please See PDF for Tissue Analytics] : Please See PDF for [employer] Analytics. [de-identified] : NAD [FreeTextEntry1] : EDWARD SLAUGHTER

## 2024-08-09 NOTE — PLAN
[FreeTextEntry1] : 83F with s/p L foot P1RR open from [facility] and 3/5 s/p L foot 2nd digit amputation with  somagen graft francisca - 3/5 s/p left foot 2nd digit amputation with Somagen graft application, graft site with mild malodor, graft not taken by wound centrally and peripherally with some mild take proximally, periwound sloughing, sub second metatarsal head wound to subq and no signs of infection - L foot post surgical wound changes with increase drainage plantarly at the 3rd MPJ  - Using sterile suture removal kit and curette, excisional debridement to the level of subQ and not beyond  - Wounds dressed with santyl followed by DSD - Instructed patient and son to change dressings 2x a day to minimize drainage to other digits - discussed with patient's son and patient the potential need for TMA - RTC 3 weeks

## 2024-08-09 NOTE — HISTORY OF PRESENT ILLNESS
[FreeTextEntry1] : 83F with PMH of R BKA, scleroderma, and CREST presenting with her daughter today for L foot P1RR open wound (DOS Aug 2023).  Patient is s/p L foot 2nd digit amputation with somagen graft application. Pt was seen by Dr. Valente who discontinued Levaquin for residual osteomyelitis of the 2nd met + margin. Has not seen ASAD Valente lately as they were told everything was fine. Topical O2 stopped. Has nursing services daily 7 days a week. Denies any N/V/C/F/D/SOB.   5/6 ESR: 49, CRP : 8mg/dL, WBC 7.56

## 2024-08-09 NOTE — PHYSICAL EXAM
[0] : left 0 [Ankle Swelling (On Exam)] : present [Ankle Swelling On The Left] : moderate [Please See PDF for Tissue Analytics] : Please See PDF for [employer] Analytics. [de-identified] : NAD [FreeTextEntry1] : EDWARD SLAUGHTER

## 2024-08-26 ENCOUNTER — OUTPATIENT (OUTPATIENT)
Dept: OUTPATIENT SERVICES | Facility: HOSPITAL | Age: 84
LOS: 1 days | End: 2024-08-26
Payer: MEDICARE

## 2024-08-26 ENCOUNTER — APPOINTMENT (OUTPATIENT)
Dept: WOUND CARE | Facility: HOSPITAL | Age: 84
End: 2024-08-26
Payer: MEDICARE

## 2024-08-26 DIAGNOSIS — Z98.890 OTHER SPECIFIED POSTPROCEDURAL STATES: Chronic | ICD-10-CM

## 2024-08-26 DIAGNOSIS — Z89.421 ACQUIRED ABSENCE OF OTHER RIGHT TOE(S): Chronic | ICD-10-CM

## 2024-08-26 DIAGNOSIS — S88.111D COMPLETE TRAUMATIC AMPUTATION AT LVL BETWEEN KNEE AND ANKLE, RIGHT LOWER LEG, SUBSEQUENT ENCOUNTER: ICD-10-CM

## 2024-08-26 DIAGNOSIS — L97.522 NON-PRESSURE CHRONIC ULCER OF OTHER PART OF LEFT FOOT WITH FAT LAYER EXPOSED: ICD-10-CM

## 2024-08-26 DIAGNOSIS — I73.9 PERIPHERAL VASCULAR DISEASE, UNSPECIFIED: ICD-10-CM

## 2024-08-26 DIAGNOSIS — M34.1 CR(E)ST SYNDROME: ICD-10-CM

## 2024-08-26 PROCEDURE — G0463: CPT

## 2024-08-26 PROCEDURE — 99213 OFFICE O/P EST LOW 20 MIN: CPT

## 2024-08-26 NOTE — PHYSICAL EXAM
[0] : left 0 [Ankle Swelling (On Exam)] : present [Ankle Swelling On The Left] : moderate [Please See PDF for Tissue Analytics] : Please See PDF for [employer] Analytics. [de-identified] : NAD [FreeTextEntry1] : EDWARD SLAUGHTER

## 2024-08-26 NOTE — PLAN
[FreeTextEntry1] : 83F with s/p L foot P1RR open from [facility] and 3/5 s/p L foot 2nd digit amputation with  somagen graft francisca - 3/5 s/p left foot 2nd digit amputation with Somagen graft application, graft site with no malodor,  periwound sloughing, sub second metatarsal head wound to subq and no signs of infection - No debridement this visit  - Wounds cleansed with Vash and dressed with santyl followed by DSD - Instructed patient and son to change dressings 2x a day to minimize drainage to other digits - discussed with patient's son and patient the potential need for TMA however at this time the wounds are improving likely due to elevating feet in bed.  States that she has been sleeping on a wedge with her feet down which has resulted in the drainage triggering the Raynauds and tissue loss. - Continue with dressing changes by WCN as instructed  - RTC 3 weeks

## 2024-08-26 NOTE — HISTORY OF PRESENT ILLNESS
[FreeTextEntry1] : 83F with PMH of R BKA, scleroderma, and CREST presenting with her daughter today for L foot P1RR open wound (DOS Aug 2023).  Patient is s/p L foot 2nd digit amputation with somagen graft application.  Was recently hospitalized for 2 days for GI pain found to have a stomach ulcer taking ibuprofen.  While in the hospital noted that her leg edema resolved and the wounds were improving.  Denies any N/V/C/F/D/SOB.

## 2024-09-16 ENCOUNTER — OUTPATIENT (OUTPATIENT)
Dept: OUTPATIENT SERVICES | Facility: HOSPITAL | Age: 84
LOS: 1 days | End: 2024-09-16
Payer: MEDICARE

## 2024-09-16 ENCOUNTER — APPOINTMENT (OUTPATIENT)
Dept: WOUND CARE | Facility: HOSPITAL | Age: 84
End: 2024-09-16

## 2024-09-16 DIAGNOSIS — Z98.890 OTHER SPECIFIED POSTPROCEDURAL STATES: Chronic | ICD-10-CM

## 2024-09-16 DIAGNOSIS — S88.111D COMPLETE TRAUMATIC AMPUTATION AT LVL BETWEEN KNEE AND ANKLE, RIGHT LOWER LEG, SUBSEQUENT ENCOUNTER: ICD-10-CM

## 2024-09-16 DIAGNOSIS — I73.9 PERIPHERAL VASCULAR DISEASE, UNSPECIFIED: ICD-10-CM

## 2024-09-16 DIAGNOSIS — Z89.511 ACQUIRED ABSENCE OF RIGHT LEG BELOW KNEE: Chronic | ICD-10-CM

## 2024-09-16 DIAGNOSIS — L97.522 NON-PRESSURE CHRONIC ULCER OF OTHER PART OF LEFT FOOT WITH FAT LAYER EXPOSED: ICD-10-CM

## 2024-09-16 DIAGNOSIS — Z89.421 ACQUIRED ABSENCE OF OTHER RIGHT TOE(S): Chronic | ICD-10-CM

## 2024-09-16 DIAGNOSIS — M34.1 CR(E)ST SYNDROME: ICD-10-CM

## 2024-09-16 PROCEDURE — 11042 DBRDMT SUBQ TIS 1ST 20SQCM/<: CPT

## 2024-09-16 NOTE — HISTORY OF PRESENT ILLNESS
[FreeTextEntry1] : 83F with PMH of RLE BKA, scleroderma, and CREST presenting with her daughter today for left foot partial first ray resection open wound (DOS 8/2023). Patient is s/p left foot 2nd digit amputation with somagen graft application. Was recently hospitalized for 2 days for GI pain found to have a stomach ulcer taking ibuprofen. While in the hospital noted that her leg edema resolved and the wounds were improving. Pt has been applying santyl and topical O2 twice a day with nursing and a home aid. Denies any N/V/C/F/D/SOB.

## 2024-09-16 NOTE — PHYSICAL EXAM
[0] : left 0 [Ankle Swelling (On Exam)] : present [Ankle Swelling On The Left] : moderate [de-identified] : NAD [de-identified] : 3/5 s/p left foot 2nd digit amputation with Somagen graft application, wounds to subq, fibrogranular, mild sloughing necrotic overlying tissue, no malodor,  periwound sloughing, sub second metatarsal head wound to subq, no acute signs of infection. [FreeTextEntry1] : EDWARD SLAUGHTER  [de-identified] : LLE sensation diminished to the level of the digits. [Please See PDF for [employer] Analytics] : Please See PDF for [employer].

## 2024-09-16 NOTE — PLAN
[FreeTextEntry1] : 83F s/p left foot partial first ray resection, open, from [facility] and 3/5 s/p left foot 2nd digit amputation with Somagen graft application. - Pt seen and evaluated with daughter acting as the . - 3/5 s/p left foot 2nd digit amputation with Somagen graft application, wounds to subq, fibrogranular, mild sloughing necrotic overlying tissue, no malodor,  periwound sloughing, sub second metatarsal head wound to subq, no acute signs of infection. - Left foot wound excisionally debrided with #15 blade, suture removal kit, and currette down to the level of but not beyond subq. - Wounds cleansed with saline and applied santyl and dry sterile dressing. - Instructed patient and daughter to change dressings 2x a day to minimize drainage to other digits. - RTC in 3 weeks.

## 2024-09-18 DIAGNOSIS — I73.9 PERIPHERAL VASCULAR DISEASE, UNSPECIFIED: ICD-10-CM

## 2024-09-18 DIAGNOSIS — L97.522 NON-PRESSURE CHRONIC ULCER OF OTHER PART OF LEFT FOOT WITH FAT LAYER EXPOSED: ICD-10-CM

## 2024-10-07 ENCOUNTER — APPOINTMENT (OUTPATIENT)
Dept: WOUND CARE | Facility: HOSPITAL | Age: 84
End: 2024-10-07
Payer: MEDICARE

## 2024-10-07 DIAGNOSIS — M34.1 CR(E)ST SYNDROME: ICD-10-CM

## 2024-10-07 DIAGNOSIS — S88.111D COMPLETE TRAUMATIC AMPUTATION AT LVL BETWEEN KNEE AND ANKLE, RIGHT LOWER LEG, SUBSEQUENT ENCOUNTER: ICD-10-CM

## 2024-10-07 DIAGNOSIS — L97.522 NON-PRESSURE CHRONIC ULCER OF OTHER PART OF LEFT FOOT WITH FAT LAYER EXPOSED: ICD-10-CM

## 2024-10-07 DIAGNOSIS — I73.9 PERIPHERAL VASCULAR DISEASE, UNSPECIFIED: ICD-10-CM

## 2024-10-07 DIAGNOSIS — Z89.422 ACQUIRED ABSENCE OF OTHER LEFT TOE(S): ICD-10-CM

## 2024-10-07 PROCEDURE — 11042 DBRDMT SUBQ TIS 1ST 20SQCM/<: CPT

## 2024-10-07 RX ORDER — COLLAGENASE SANTYL 250 [ARB'U]/G
250 OINTMENT TOPICAL DAILY
Qty: 1 | Refills: 0 | Status: ACTIVE | COMMUNITY
Start: 2024-10-07 | End: 1900-01-01

## 2024-10-09 RX ORDER — COLLAGENASE SANTYL 250 [ARB'U]/G
250 OINTMENT TOPICAL DAILY
Qty: 1 | Refills: 3 | Status: ACTIVE | COMMUNITY
Start: 2024-10-09 | End: 1900-01-01

## 2024-10-24 DIAGNOSIS — L97.522 NON-PRESSURE CHRONIC ULCER OF OTHER PART OF LEFT FOOT WITH FAT LAYER EXPOSED: ICD-10-CM

## 2024-10-28 ENCOUNTER — APPOINTMENT (OUTPATIENT)
Dept: WOUND CARE | Facility: HOSPITAL | Age: 84
End: 2024-10-28
Payer: MEDICARE

## 2024-10-28 ENCOUNTER — OUTPATIENT (OUTPATIENT)
Dept: OUTPATIENT SERVICES | Facility: HOSPITAL | Age: 84
LOS: 1 days | End: 2024-10-28
Payer: MEDICARE

## 2024-10-28 DIAGNOSIS — Z98.890 OTHER SPECIFIED POSTPROCEDURAL STATES: Chronic | ICD-10-CM

## 2024-10-28 DIAGNOSIS — I73.9 PERIPHERAL VASCULAR DISEASE, UNSPECIFIED: ICD-10-CM

## 2024-10-28 DIAGNOSIS — S88.111D COMPLETE TRAUMATIC AMPUTATION AT LVL BETWEEN KNEE AND ANKLE, RIGHT LOWER LEG, SUBSEQUENT ENCOUNTER: ICD-10-CM

## 2024-10-28 DIAGNOSIS — Z89.421 ACQUIRED ABSENCE OF OTHER RIGHT TOE(S): Chronic | ICD-10-CM

## 2024-10-28 DIAGNOSIS — M34.1 CR(E)ST SYNDROME: ICD-10-CM

## 2024-10-28 DIAGNOSIS — Z89.511 ACQUIRED ABSENCE OF RIGHT LEG BELOW KNEE: Chronic | ICD-10-CM

## 2024-10-28 DIAGNOSIS — Z89.422 ACQUIRED ABSENCE OF OTHER LEFT TOE(S): ICD-10-CM

## 2024-10-28 DIAGNOSIS — L97.522 NON-PRESSURE CHRONIC ULCER OF OTHER PART OF LEFT FOOT WITH FAT LAYER EXPOSED: ICD-10-CM

## 2024-10-28 PROCEDURE — 99213 OFFICE O/P EST LOW 20 MIN: CPT

## 2024-11-05 DIAGNOSIS — S88.111D COMPLETE TRAUMATIC AMPUTATION AT LEVEL BETWEEN KNEE AND ANKLE, RIGHT LOWER LEG, SUBSEQUENT ENCOUNTER: ICD-10-CM

## 2024-11-13 ENCOUNTER — OUTPATIENT (OUTPATIENT)
Dept: OUTPATIENT SERVICES | Facility: HOSPITAL | Age: 84
LOS: 1 days | End: 2024-11-13
Payer: MEDICARE

## 2024-11-13 VITALS
TEMPERATURE: 98 F | SYSTOLIC BLOOD PRESSURE: 112 MMHG | HEART RATE: 73 BPM | RESPIRATION RATE: 18 BRPM | HEIGHT: 64 IN | DIASTOLIC BLOOD PRESSURE: 68 MMHG | OXYGEN SATURATION: 100 % | WEIGHT: 134.92 LBS

## 2024-11-13 DIAGNOSIS — Z98.890 OTHER SPECIFIED POSTPROCEDURAL STATES: Chronic | ICD-10-CM

## 2024-11-13 DIAGNOSIS — M86.172 OTHER ACUTE OSTEOMYELITIS, LEFT ANKLE AND FOOT: ICD-10-CM

## 2024-11-13 DIAGNOSIS — Z89.421 ACQUIRED ABSENCE OF OTHER RIGHT TOE(S): Chronic | ICD-10-CM

## 2024-11-13 DIAGNOSIS — Z01.818 ENCOUNTER FOR OTHER PREPROCEDURAL EXAMINATION: ICD-10-CM

## 2024-11-13 DIAGNOSIS — Z89.511 ACQUIRED ABSENCE OF RIGHT LEG BELOW KNEE: Chronic | ICD-10-CM

## 2024-11-13 LAB
BLD GP AB SCN SERPL QL: NEGATIVE — SIGNIFICANT CHANGE UP
RH IG SCN BLD-IMP: POSITIVE — SIGNIFICANT CHANGE UP

## 2024-11-13 NOTE — H&P PST ADULT - SKIN
Patient refused examination of left foot wound- dressing on Dry and intact./warm and dry/color normal/normal/no rashes

## 2024-11-13 NOTE — H&P PST ADULT - PROBLEM SELECTOR PLAN 1
Left Foot Transmetatarsal  Amputation  Acute Osteomyelitis  Left Ankle  and foot on 11/22/24  Pre- Op Instructions discussed   Labs sent  pt will continue HTN meds    last cardiology notes , echo    labs and ekg on file

## 2024-11-13 NOTE — H&P PST ADULT - FUNCTIONAL STATUS
METS 3.30- DASI- patient has limited activities due to Hx of Right BKA and left foot wound. Uses walker and able to walk indoors with assistance. Uses wheelchair for transport./less than 4 METS

## 2024-11-13 NOTE — H&P PST ADULT - CARDIOVASCULAR
chronic - LLE - follow vascular as per daughter  last doppler done in 3/24/normal/regular rate and rhythm/S1 S2 present/no gallops/no rub/murmur/peripheral edema details… +2 chronic - LLE - follow vascular as per daughter  last doppler done in 3/24/normal/regular rate and rhythm/S1 S2 present/no gallops/no rub/murmur/peripheral edema

## 2024-11-13 NOTE — H&P PST ADULT - NSICDXPASTMEDICALHX_GEN_ALL_CORE_FT
PAST MEDICAL HISTORY:  Chronic foot ulcer     Gangrene, not elsewhere classified     GIB (gastrointestinal bleeding)     History of CREST syndrome     HLD (hyperlipidemia)     [place] (hard of hearing)     Hypertension     Non-pressure chronic ulcer of other part of left foot with necrosis of bone     PVD (peripheral vascular disease)     Raynaud disease     Seasonal allergies      PAST MEDICAL HISTORY:  Anemia     Chronic foot ulcer     Gangrene, not elsewhere classified     GIB (gastrointestinal bleeding)     History of CREST syndrome     HLD (hyperlipidemia)     [place] (hard of hearing)     Hypertension     Non-pressure chronic ulcer of other part of left foot with necrosis of bone     PVD (peripheral vascular disease)     Raynaud disease     Seasonal allergies      PAST MEDICAL HISTORY:  Anemia     Chronic foot ulcer     Gangrene, not elsewhere classified     GIB (gastrointestinal bleeding)     History of CREST syndrome     HLD (hyperlipidemia)     [place] (hard of hearing)     Hypertension     Non-pressure chronic ulcer of other part of left foot with necrosis of bone     PVD (peripheral vascular disease)     PVD (peripheral vascular disease)     Raynaud disease     Seasonal allergies      PAST MEDICAL HISTORY:  Anemia     Aortic valve regurgitation     Chronic foot ulcer     Gangrene, not elsewhere classified     GIB (gastrointestinal bleeding)     History of CREST syndrome     HLD (hyperlipidemia)     [place] (hard of hearing)     Hypertension     Non-pressure chronic ulcer of other part of left foot with necrosis of bone     PVD (peripheral vascular disease)     PVD (peripheral vascular disease)     Raynaud disease     Seasonal allergies

## 2024-11-13 NOTE — H&P PST ADULT - OTHER CARE PROVIDERS
Cardiologist- Dr. Vincent eTrrazas-[phone]  seen last week for regular check up  advise to get cardiology Virgen lew Dr  [phone]

## 2024-11-13 NOTE — H&P PST ADULT - ASSESSMENT
ACTIVITY-  daily activities ,limited due to left leg wound   Energy Expenditure(Mets):    3.25 by dasi mets   Symptoms-none     Airway:  normal    Mallampati-     2  Dental: Patient denies loose teeth  + full dentures

## 2024-11-13 NOTE — H&P PST ADULT - HISTORY OF PRESENT ILLNESS
84 year old female with pmhx of Raynaud's disease, CREST syndrome on , PVD, S/p right BKA, GIB due to Antiplatelet medication (2022), PVD, Chronic left toe wound,   Non pressure chronic ulcer other part of left foot with necrosis of bone s/p Amputation of left foot 2nd digit with dermal skin application in 3/24 . Denies foot pain, cp, sob, fevers. Presents to PST for scheduled Left foot metatarsal  amputation with Achilles Lengthening on 11/22/24. 84 year old female with pmhx of Anemia , Raynaud's disease, CREST syndrome on , PVD, S/p right BKA, GIB due to Antiplatelet medication (2022), PVD, Chronic left toe wound,   Non pressure chronic ulcer other part of left foot with necrosis of bone s/p Amputation of left foot 2nd digit with dermal skin application in 3/24 . Denies foot pain, cp, sob, fevers. Presents to PST for scheduled Left foot metatarsal  amputation with Achilles Lengthening on 11/22/24. 84 year old female with pmhx of Anemia , Raynaud's disease, CREST syndrome on , PVD, S/p right BKA, h/o afib not on AC, GIB due to Antiplatelet medication (2022), PVD, Chronic left toe wound,   Non pressure chronic ulcer other part of left foot with necrosis of bone s/p Amputation of left foot 2nd digit with dermal skin application in 3/24 . Denies foot pain, cp, sob, fevers. Presents to PST for scheduled Left foot metatarsal  amputation with Achilles Lengthening on 11/22/24.

## 2024-11-13 NOTE — H&P PST ADULT - NEGATIVE ENMT SYMPTOMS
no ear pain/no tinnitus/no vertigo/no sinus symptoms/no nasal congestion/no nose bleeds/no abnormal taste sensation/no gum bleeding/no dry mouth/no throat pain/no dysphagia

## 2024-11-18 ENCOUNTER — APPOINTMENT (OUTPATIENT)
Dept: WOUND CARE | Facility: HOSPITAL | Age: 84
End: 2024-11-18
Payer: MEDICARE

## 2024-11-18 DIAGNOSIS — Z89.422 ACQUIRED ABSENCE OF OTHER LEFT TOE(S): ICD-10-CM

## 2024-11-18 DIAGNOSIS — I73.9 PERIPHERAL VASCULAR DISEASE, UNSPECIFIED: ICD-10-CM

## 2024-11-18 DIAGNOSIS — L97.522 NON-PRESSURE CHRONIC ULCER OF OTHER PART OF LEFT FOOT WITH FAT LAYER EXPOSED: ICD-10-CM

## 2024-11-18 PROBLEM — I35.1 NONRHEUMATIC AORTIC (VALVE) INSUFFICIENCY: Chronic | Status: ACTIVE | Noted: 2024-11-14

## 2024-11-18 PROBLEM — D64.9 ANEMIA, UNSPECIFIED: Chronic | Status: ACTIVE | Noted: 2024-11-13

## 2024-11-18 PROCEDURE — 99213 OFFICE O/P EST LOW 20 MIN: CPT

## 2024-11-20 PROCEDURE — G0463: CPT

## 2024-11-20 PROCEDURE — 86850 RBC ANTIBODY SCREEN: CPT

## 2024-11-20 PROCEDURE — 86900 BLOOD TYPING SEROLOGIC ABO: CPT

## 2024-11-20 PROCEDURE — 86901 BLOOD TYPING SEROLOGIC RH(D): CPT

## 2024-11-22 ENCOUNTER — INPATIENT (INPATIENT)
Facility: HOSPITAL | Age: 84
LOS: 6 days | Discharge: HOME HEALTH | DRG: 541 | End: 2024-11-29
Attending: STUDENT IN AN ORGANIZED HEALTH CARE EDUCATION/TRAINING PROGRAM | Admitting: STUDENT IN AN ORGANIZED HEALTH CARE EDUCATION/TRAINING PROGRAM
Payer: MEDICARE

## 2024-11-22 VITALS
DIASTOLIC BLOOD PRESSURE: 71 MMHG | OXYGEN SATURATION: 97 % | SYSTOLIC BLOOD PRESSURE: 116 MMHG | RESPIRATION RATE: 18 BRPM | HEIGHT: 64.02 IN | TEMPERATURE: 97 F | HEART RATE: 64 BPM | WEIGHT: 134.92 LBS

## 2024-11-22 DIAGNOSIS — Z98.890 OTHER SPECIFIED POSTPROCEDURAL STATES: Chronic | ICD-10-CM

## 2024-11-22 DIAGNOSIS — E03.9 HYPOTHYROIDISM, UNSPECIFIED: ICD-10-CM

## 2024-11-22 DIAGNOSIS — Z89.421 ACQUIRED ABSENCE OF OTHER RIGHT TOE(S): Chronic | ICD-10-CM

## 2024-11-22 DIAGNOSIS — M86.672 OTHER CHRONIC OSTEOMYELITIS, LEFT ANKLE AND FOOT: ICD-10-CM

## 2024-11-22 DIAGNOSIS — D64.9 ANEMIA, UNSPECIFIED: ICD-10-CM

## 2024-11-22 DIAGNOSIS — Z89.511 ACQUIRED ABSENCE OF RIGHT LEG BELOW KNEE: Chronic | ICD-10-CM

## 2024-11-22 DIAGNOSIS — E78.5 HYPERLIPIDEMIA, UNSPECIFIED: ICD-10-CM

## 2024-11-22 DIAGNOSIS — I10 ESSENTIAL (PRIMARY) HYPERTENSION: ICD-10-CM

## 2024-11-22 DIAGNOSIS — I73.00 RAYNAUD'S SYNDROME WITHOUT GANGRENE: ICD-10-CM

## 2024-11-22 DIAGNOSIS — Z29.9 ENCOUNTER FOR PROPHYLACTIC MEASURES, UNSPECIFIED: ICD-10-CM

## 2024-11-22 DIAGNOSIS — M86.172 OTHER ACUTE OSTEOMYELITIS, LEFT ANKLE AND FOOT: ICD-10-CM

## 2024-11-22 DIAGNOSIS — Z01.818 ENCOUNTER FOR OTHER PREPROCEDURAL EXAMINATION: ICD-10-CM

## 2024-11-22 LAB — RH IG SCN BLD-IMP: POSITIVE — SIGNIFICANT CHANGE UP

## 2024-11-22 PROCEDURE — 88305 TISSUE EXAM BY PATHOLOGIST: CPT | Mod: 26

## 2024-11-22 PROCEDURE — 99223 1ST HOSP IP/OBS HIGH 75: CPT

## 2024-11-22 PROCEDURE — 73630 X-RAY EXAM OF FOOT: CPT | Mod: 26,LT

## 2024-11-22 PROCEDURE — 88311 DECALCIFY TISSUE: CPT | Mod: 26

## 2024-11-22 RX ORDER — HYDROMORPHONE HYDROCHLORIDE 2 MG/1
0.5 TABLET ORAL EVERY 4 HOURS
Refills: 0 | Status: DISCONTINUED | OUTPATIENT
Start: 2024-11-22 | End: 2024-11-23

## 2024-11-22 RX ORDER — FERROUS SULFATE 325(65) MG
325 TABLET ORAL DAILY
Refills: 0 | Status: DISCONTINUED | OUTPATIENT
Start: 2024-11-22 | End: 2024-11-29

## 2024-11-22 RX ORDER — PANTOPRAZOLE SODIUM 40 MG/1
40 TABLET, DELAYED RELEASE ORAL
Refills: 0 | Status: DISCONTINUED | OUTPATIENT
Start: 2024-11-22 | End: 2024-11-29

## 2024-11-22 RX ORDER — GLUCOSAMINE SULFATE DIPOT CHLR 500 MG
1 CAPSULE ORAL DAILY
Refills: 0 | Status: DISCONTINUED | OUTPATIENT
Start: 2024-11-22 | End: 2024-11-29

## 2024-11-22 RX ORDER — CYANOCOBALAMIN/FOLIC AC/VIT B6 1-2.2-25MG
1 TABLET ORAL DAILY
Refills: 0 | Status: DISCONTINUED | OUTPATIENT
Start: 2024-11-22 | End: 2024-11-29

## 2024-11-22 RX ORDER — AMLODIPINE BESYLATE 10 MG/1
5 TABLET ORAL DAILY
Refills: 0 | Status: DISCONTINUED | OUTPATIENT
Start: 2024-11-22 | End: 2024-11-29

## 2024-11-22 RX ORDER — ONDANSETRON HYDROCHLORIDE 4 MG/1
4 TABLET, FILM COATED ORAL ONCE
Refills: 0 | Status: DISCONTINUED | OUTPATIENT
Start: 2024-11-22 | End: 2024-11-22

## 2024-11-22 RX ORDER — SODIUM CHLORIDE 9 MG/ML
3 INJECTION, SOLUTION INTRAMUSCULAR; INTRAVENOUS; SUBCUTANEOUS EVERY 8 HOURS
Refills: 0 | Status: DISCONTINUED | OUTPATIENT
Start: 2024-11-22 | End: 2024-11-22

## 2024-11-22 RX ORDER — LIDOCAINE HCL 20 MG/ML
0.2 VIAL (ML) INJECTION ONCE
Refills: 0 | Status: DISCONTINUED | OUTPATIENT
Start: 2024-11-22 | End: 2024-11-22

## 2024-11-22 RX ORDER — CEFAZOLIN SODIUM 10 G
2000 VIAL (EA) INJECTION ONCE
Refills: 0 | Status: COMPLETED | OUTPATIENT
Start: 2024-11-22 | End: 2024-11-22

## 2024-11-22 RX ORDER — HYDROXYCHLOROQUINE SULFATE 200 MG/1
200 TABLET, FILM COATED ORAL
Refills: 0 | Status: DISCONTINUED | OUTPATIENT
Start: 2024-11-22 | End: 2024-11-29

## 2024-11-22 RX ORDER — LEVOTHYROXINE SODIUM 150 MCG
1 TABLET ORAL
Refills: 0 | DISCHARGE

## 2024-11-22 RX ORDER — ACETAMINOPHEN 500MG 500 MG/1
650 TABLET, COATED ORAL EVERY 6 HOURS
Refills: 0 | Status: DISCONTINUED | OUTPATIENT
Start: 2024-11-22 | End: 2024-11-23

## 2024-11-22 RX ORDER — FENTANYL 12 UG/H
25 PATCH, EXTENDED RELEASE TRANSDERMAL
Refills: 0 | Status: DISCONTINUED | OUTPATIENT
Start: 2024-11-22 | End: 2024-11-22

## 2024-11-22 RX ORDER — LEVOTHYROXINE SODIUM 150 MCG
112 TABLET ORAL DAILY
Refills: 0 | Status: DISCONTINUED | OUTPATIENT
Start: 2024-11-22 | End: 2024-11-29

## 2024-11-22 RX ADMIN — Medication 10 MILLIGRAM(S): at 21:40

## 2024-11-22 RX ADMIN — HYDROXYCHLOROQUINE SULFATE 200 MILLIGRAM(S): 200 TABLET, FILM COATED ORAL at 22:24

## 2024-11-22 RX ADMIN — ACETAMINOPHEN 500MG 650 MILLIGRAM(S): 500 TABLET, COATED ORAL at 17:38

## 2024-11-22 RX ADMIN — ACETAMINOPHEN 500MG 650 MILLIGRAM(S): 500 TABLET, COATED ORAL at 17:15

## 2024-11-22 NOTE — BRIEF OPERATIVE NOTE - OPERATION/FINDINGS
Patient is s/p left foot transmetatarsal amputation with tendo achilles lengthening   Low concern for residual bone infection, bone at proximal resection was of good quality  High concern for viability, due to vascular history of Raynaud, CREST  Closed with a penrose drain, staples, 3.0 Vicryl and 3.0 Nylon

## 2024-11-22 NOTE — H&P ADULT - PROBLEM SELECTOR PLAN 3
takes simvastatin 20mg qHS at home   - c/w inpatient formulary interchange with atorvastatin 10mg qHS  - resume home simvastatin on d/c

## 2024-11-22 NOTE — DISCHARGE NOTE PROVIDER - PROVIDER TOKENS
PROVIDER:[TOKEN:[[id number]],FOLLOWUP:[1 week]],PROVIDER:[TOKEN:[[id number]],FOLLOWUP:[1 month]],PROVIDER:[TOKEN:[[id number]],FOLLOWUP:[1 week]]

## 2024-11-22 NOTE — PATIENT PROFILE ADULT - FALL HARM RISK - RISK INTERVENTIONS

## 2024-11-22 NOTE — DISCHARGE NOTE PROVIDER - HOSPITAL COURSE
HPI:  85 yo female with PMH of Raynaud's disease, CREST syndrome, PVD, s/p R BKA, hx of afib not on a/c 2/2 GIB i/s/o of antiplatelet medication, chronic left toe wound, non-pressure chronic ulcer other part of left foot with necrosis of bone s/p amputation of L 2nd digit with dermal skin application in 3/2024 who presented for scheduled left foot metatarsal amputation with Achilles lengthening on 11/22/24. Patient denies any fever, chills, chest pain, nor dyspnea. Post-op pain controlled.    Vitals in PACU stable. Admitted to medicine for further management and PT eval. (22 Nov 2024 16:59)    Hospital Course:  Pt admitted with chronic L foot OM-started on cefazolin and meropenem.  s/p L foot TMA w/ achilles lengthening 11/22/24with podiatry.  , pt also with Raynaud/CREST syndrome, on Plaquenil. 11/24 Tissue cx (+) Serratia and Strep species- converted Cefepime, ID followed.  Podiatry felt incision site looked dusky and cold- vascular consulted, no further vascular surgery intervention and no further podiatric surgical intervention planned at this time.  per ID OR culture may be contaminated therefore would await pathology report, If shows osteomyelitis will require 6 weeks of antibiotics.  Patient with postoperative clean margin cultures positive however, pt will require long term abx.  PICC line ordered/placed.  DC to home with home care.  Acute issues resolved.  Patient has been medically cleared for discharge as per Dr. Patel.  Patient has been given appropriate discharge instructions including medication regimen, access site management and follow up. Medications that patient needs refills on (+/- new medications) have been e-prescribed to preferred pharmacy. Patient will f/u with Dr. Sandoval ID, PCP and podiatry in 1-2 weeks for further management.     Important Medication Changes and Reason:    Active or Pending Issues Requiring Follow-up:    Advanced Directives:   [x ] Full code  [ ] DNR  [ ] Hospice    Discharge Diagnoses:  Chronic L foot OM, (+) Serratia and Strep species  s/p L foot TMA   Raynaud/CREST syndrome  	     HPI:  83 yo female with PMH of Raynaud's disease, CREST syndrome, PVD, s/p R BKA, hx of afib not on a/c 2/2 GIB i/s/o of antiplatelet medication, chronic left toe wound, non-pressure chronic ulcer other part of left foot with necrosis of bone s/p amputation of L 2nd digit with dermal skin application in 3/2024 who presented for scheduled left foot metatarsal amputation with Achilles lengthening on 11/22/24. Patient denies any fever, chills, chest pain, nor dyspnea. Post-op pain controlled.    Vitals in PACU stable. Admitted to medicine for further management and PT eval. (22 Nov 2024 16:59)    Hospital Course:  Pt admitted with chronic L foot OM-started on cefazolin and meropenem.  s/p L foot TMA w/ achilles lengthening 11/22/24with podiatry.  , pt also with Raynaud/CREST syndrome, on Plaquenil. 11/24 Tissue cx (+) Serratia and Strep species- converted Cefepime, ID followed.  Podiatry felt incision site looked dusky and cold- vascular consulted, no further vascular surgery intervention and no further podiatric surgical intervention planned at this time.  per ID OR culture may be contaminated therefore would await pathology report, If shows osteomyelitis will require 6 weeks of antibiotics.  Patient with postoperative clean margin cultures positive however, pt will require long term abx.  PICC line ordered/placed.  DC to home with home care.  Acute issues resolved.  Patient has been medically cleared for discharge as per Dr. Patel.  Patient has been given appropriate discharge instructions including medication regimen, access site management and follow up. Medications that patient needs refills on (+/- new medications) have been e-prescribed to preferred pharmacy. Patient will f/u with Dr. Sandoval ID, PCP and podiatry in 1-2 weeks for further management.     Important Medication Changes and Reason: Cefazolin for Osteomyelitis.     Active or Pending Issues Requiring Follow-up:    Advanced Directives:   [x ] Full code  [ ] DNR  [ ] Hospice    Discharge Diagnoses:  Chronic L foot OM, (+) Serratia and Strep species  s/p L foot TMA   Raynaud/CREST syndrome

## 2024-11-22 NOTE — DISCHARGE NOTE PROVIDER - CARE PROVIDERS DIRECT ADDRESSES
[email or web address]

## 2024-11-22 NOTE — PRE-OP CHECKLIST - ISOLATION PRECAUTIONS
72F with hx of SLE, MCTD with AHRF with acute on chronic renal disease here for further evaluation of acute kidney injury. Ddx includes scleroderma renal crisis, less likely lupus nephritis. Complement levels wnl. Cr improving. Complicated with ep of hypotension at OSH when captopril started. On this admission, patient persistently hypertensive despite increasing captopril, asymptomatic. Renal biopsy cancelled twice due to uncontrolled HTN. Today, BP improved in last 24 hours on captopril 25mg TID and felodpine 2.5mg. Coreg discontinued    #Hypertension  -cw captopril 25mg TID. Add feldopine 2.5mg (quicker acting than amlodipine)  -transition from nicotine patch to inhaler  -BP check q4hrs    #NED   -renal biopsy by IR 10/6 if BP  -monitor cr  -prednisone taper     Dispo planning after IR procedure and monitoring    d/w HS5. Updated patient and  at bedside    Deepika Alejandra MD  Division of Hospital Medicine  Available on [employer] Teams . none

## 2024-11-22 NOTE — DISCHARGE NOTE PROVIDER - NSDCMRMEDTOKEN_GEN_ALL_CORE_FT
amLODIPine 5 mg oral tablet: 1 tab(s) orally once a day (in the morning)  Feosol: 1 tab orally once a day  folic acid 1 mg oral tablet: 1 tab(s) orally once a day (in the morning)  hydroxychloroquine 200 mg oral tablet: 1 tab(s) orally 2 times a day  levothyroxine 125 mcg (0.125 mg) oral tablet: 1 tab(s) orally once a day (in the morning)  Multiple Vitamins oral tablet: 1 tab(s) orally once a day  2024  omeprazole 20 mg oral delayed release tablet: 1 tab(s) orally once a day (in the morning)  simvastatin 20 mg oral tablet: 1 tab(s) orally once a day (at bedtime)  slow m tabs orally once a day   amLODIPine 5 mg oral tablet: 1 tab(s) orally once a day (in the morning)  cyanocobalamin 1000 mcg oral tablet: 1 tab(s) orally once a day  Feosol: 1 tab orally once a day  folic acid 1 mg oral tablet: 1 tab(s) orally once a day (in the morning)  hydroxychloroquine 200 mg oral tablet: 1 tab(s) orally 2 times a day  levothyroxine 112 mcg (0.112 mg) oral tablet: 1 tab(s) orally once a day  LLE CAM Walker : Please dispense Left CAM walker. IND: s/p left transmetatarsal amputation with tendochilles lengthening. Dx: Osteomyelitis and Diabetic ulcer  Multiple Vitamins oral tablet: 1 tab(s) orally once a day LD 2024  omeprazole 20 mg oral delayed release tablet: 1 tab(s) orally once a day (in the morning)  simvastatin 20 mg oral tablet: 1 tab(s) orally once a day (at bedtime)  slow m tabs orally once a day   amLODIPine 5 mg oral tablet: 1 tab(s) orally once a day (in the morning)  CBC, CMP, ESR, CRP labs weekly: send to [email or web address]  ATTN: Dr Bryant Sandoval  cefepime 1 g intravenous injection: 1 gram(s) intravenous every 8 hours end date 1/3/25  cyanocobalamin 1000 mcg oral tablet: 1 tab(s) orally once a day  Feosol: 1 tab orally once a day  folic acid 1 mg oral tablet: 1 tab(s) orally once a day (in the morning)  hydroxychloroquine 200 mg oral tablet: 1 tab(s) orally 2 times a day  levothyroxine 112 mcg (0.112 mg) oral tablet: 1 tab(s) orally once a day  LLE CAM Walker : Please dispense Left CAM walker. IND: s/p left transmetatarsal amputation with tendochilles lengthening. Dx: Osteomyelitis and Diabetic ulcer  Multiple Vitamins oral tablet: 1 tab(s) orally once a day LD 2024  omeprazole 20 mg oral delayed release tablet: 1 tab(s) orally once a day (in the morning)  simvastatin 20 mg oral tablet: 1 tab(s) orally once a day (at bedtime)  slow m tabs orally once a day   amLODIPine 5 mg oral tablet: 1 tab(s) orally once a day (in the morning)  ascorbic acid 500 mg oral tablet: 1 tab(s) orally once a day  CBC, CMP, ESR, CRP labs weekly: send to [email or web address]  ATTN: Dr Bryant Sandoval  cefepime 1 g intravenous injection: 1 gram(s) intravenous every 8 hours end date 1/3/25  cyanocobalamin 1000 mcg oral tablet: 1 tab(s) orally once a day  ferrous sulfate 325 mg (65 mg elemental iron) oral tablet: 1 tab(s) orally once a day  folic acid 1 mg oral tablet: 1 tab(s) orally once a day (in the morning)  hydroxychloroquine 200 mg oral tablet: 1 tab(s) orally 2 times a day  levothyroxine 112 mcg (0.112 mg) oral tablet: 1 tab(s) orally once a day  Multiple Vitamins oral tablet: 1 tab(s) orally once a day LD 2/23/2024  omeprazole 20 mg oral delayed release tablet: 1 tab(s) orally once a day (in the morning)  oxyCODONE 5 mg oral tablet: 1 tab(s) orally every 4 hours as needed for Severe Pain (7 - 10) MDD: 6 tablets  polyethylene glycol 3350 oral powder for reconstitution: 17 gram(s) orally 2 times a day as needed for  constipation  senna leaf extract oral tablet: 2 tab(s) orally once a day (at bedtime)  simvastatin 20 mg oral tablet: 1 tab(s) orally once a day (at bedtime)

## 2024-11-22 NOTE — BRIEF OPERATIVE NOTE - NSICDXBRIEFPROCEDURE_GEN_ALL_CORE_FT
PROCEDURES:  Left foot transmetatarsal amputation 22-Nov-2024 16:10:33  Rita Bentley  Lengthening, Achilles tendon 22-Nov-2024 16:10:43  Rita Bentley

## 2024-11-22 NOTE — DISCHARGE NOTE PROVIDER - CARE PROVIDER_API CALL
Paulo Hope  Podiatric Medicine and Surgery  [address], [address]  [place] [address]  Phone: [phone]  Fax: [phone]  Follow Up Time: 1 week    Bryant Sandoval  Infectious Disease  [address] [place] [address]  Phone: [phone]  Fax: [phone]  Follow Up Time: 1 month    Guanako Elder  Internal Medicine  [facility] [place] [address]  Phone: [phone]  Fax: [phone]  Follow Up Time: 1 week

## 2024-11-22 NOTE — PRE-OP CHECKLIST - BSA (M2)
1.66
[No studies available for review at this time.] : No studies available for review at this time.

## 2024-11-22 NOTE — H&P ADULT - NSHPADDITIONALINFOADULT_GEN_ALL_CORE
.  Maria Isabel Mirza MD  Division of Hospital Medicine  [facility]   Available on [employer] Teams - messages preferred prior to calls.    Plan discussed with patient, daughter bedside, and medicine GWENDOLYN Fernandez.

## 2024-11-22 NOTE — DISCHARGE NOTE PROVIDER - DISCHARGE DIET
Pt alert and oriented cooperative. Pt changed into a blue safety gown.   Pt personal belongings checked and secured by security Trellis Koyanagi, RN  07/09/20 8800 DASH Diet

## 2024-11-22 NOTE — BRIEF OPERATIVE NOTE - SPECIMENS
Pathology: 1. Left forefoot 2. Clean bone margins of the 3rd metatarsal shaft. Microbiology: 1. Clean bone margins of the 3rd metatarsal shaft 2. Left foot deep wound culture

## 2024-11-22 NOTE — DISCHARGE NOTE PROVIDER - NSDCCPCAREPLAN_GEN_ALL_CORE_FT
PRINCIPAL DISCHARGE DIAGNOSIS  Diagnosis: Chronic osteomyelitis of left foot  Assessment and Plan of Treatment: You have been prescribed IV antibiotics for 6 weeks  A PICC line was placed  You will receive home care for management and observation on IV infusions      SECONDARY DISCHARGE DIAGNOSES  Diagnosis: Calcinosis, Raynaud's phenomenon, sclerodactyly, and telangiectasia (CREST) syndrome  Assessment and Plan of Treatment: Continue current medications     PRINCIPAL DISCHARGE DIAGNOSIS  Diagnosis: Chronic osteomyelitis of left foot  Assessment and Plan of Treatment: You have been prescribed IV antibiotics for 6 weeks  A PICC line was placed  You will receive home care for management and observation on IV antibiotic infusions.      SECONDARY DISCHARGE DIAGNOSES  Diagnosis: Calcinosis, Raynaud's phenomenon, sclerodactyly, and telangiectasia (CREST) syndrome  Assessment and Plan of Treatment: Continue current medications.  Follow up with your PMD in 1 week.    Diagnosis: HTN (hypertension)  Assessment and Plan of Treatment: Take your medication as prescribed.  Follow up with your medical doctor for routine blood pressure monitoring, and to establish long term blood pressure treatment goals.  Low salt diet  Activity as tolerated.  Notify your doctor if you have any of the following symptoms:   Dizziness, Lightheadedness, Blurry vision, Headache, Chest pain, Shortness of breath      Diagnosis: Hypothyroidism  Assessment and Plan of Treatment: Take your medication as prescribed.   Follow up with your medical doctor for routine blood  work monitoring, and to establish long term treatment goals.      Diagnosis: Chronic anemia  Assessment and Plan of Treatment: Follow up with your PCP for monitoring and management of your blood counts.    Diagnosis: HLD (hyperlipidemia)  Assessment and Plan of Treatment: Take your medication as prescribed.   Follow up with your medical doctor for routine blood  work monitoring, and to establish long term treatment goals.

## 2024-11-22 NOTE — H&P ADULT - PROBLEM SELECTOR PLAN 1
s/p left foot metatarsal amputation with Achilles lengthening on 11/22/24  - low concern for residual infection  - f/u bone margin cx - awaiting 48 hours  - c/w IV dilaudid PRN severe pain, PO percocet 5/325 PRN mod pain  - post-op care as per Podiatry  - pending CAM boot delivery then subsequent PT eval

## 2024-11-22 NOTE — BRIEF OPERATIVE NOTE - COMMENTS
Patient is s/p left foot transmetatarsal amputation with tendo achilles lengthening   Low concern for residual bone infection, bone at proximal resection was of good quality  High concern for viability, due to vascular history of Raynaud, CREST  Closed with a penrose drain, staples, 3.0 Vicryl and 3.0 Nylon  Pod plan: Patient is stable for discharge pending clean bone margins showing no growth for 48 hours and arrival of CAM boot.

## 2024-11-22 NOTE — H&P ADULT - NSHPREVIEWOFSYSTEMS_GEN_ALL_CORE
CONSTITUTIONAL: No fever, weight loss, or fatigue  EYES: No eye pain, visual disturbances, or discharge  ENMT:  No difficulty hearing, tinnitus, vertigo; No sinus or throat pain, +chronic [place]  NECK: No pain or stiffness  RESPIRATORY: No cough, wheezing, chills or hemoptysis; No shortness of breath  CARDIOVASCULAR: No chest pain, palpitations, dizziness, or leg swelling  GASTROINTESTINAL: No abdominal or epigastric pain. No nausea, vomiting, or hematemesis; No diarrhea or constipation  GENITOURINARY: No dysuria, frequency, hematuria, or incontinence  NEUROLOGICAL: No headaches, memory loss, loss of strength, numbness, or tremors  SKIN: No itching, burning, rashes, or lesions   LYMPH NODES: No enlarged glands  ENDOCRINE: No heat or cold intolerance; No hair loss  MUSCULOSKELETAL: No joint pain or swelling; No muscle, back, or extremity pain  PSYCHIATRIC: No depression, anxiety, mood swings, or difficulty sleeping  HEME/LYMPH: No easy bruising, or bleeding gums

## 2024-11-22 NOTE — H&P ADULT - NSICDXPASTMEDICALHX_GEN_ALL_CORE_FT
PAST MEDICAL HISTORY:  Anemia     Aortic valve regurgitation     Chronic foot ulcer     Gangrene, not elsewhere classified     GIB (gastrointestinal bleeding)     History of CREST syndrome     HLD (hyperlipidemia)     [place] (hard of hearing)     Hypertension     Non-pressure chronic ulcer of other part of left foot with necrosis of bone     PVD (peripheral vascular disease)     PVD (peripheral vascular disease)     Raynaud disease     Seasonal allergies

## 2024-11-22 NOTE — BRIEF OPERATIVE NOTE - NSICDXBRIEFPOSTOP_GEN_ALL_CORE_FT
POST-OP DIAGNOSIS:  Foot osteomyelitis, left 22-Nov-2024 16:13:21  Rita Bentley  Diabetic ulcer of left foot 22-Nov-2024 16:13:40  Rita Bentley

## 2024-11-22 NOTE — H&P ADULT - ASSESSMENT
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 Admitted to medicine for post-op management and PT eval.

## 2024-11-22 NOTE — DISCHARGE NOTE PROVIDER - NSDCFUADDAPPT_GEN_ALL_CORE_FT
Podiatry Discharge Instructions:  Follow up: Please follow up with Dr. Hope within 1 week of discharge from the hospital, please call [phone] for appointment and discuss that you recently were seen in the hospital.  Wound Care: Please leave your dressing clean dry intact until your follow up appointment.  Weight bearing: Please weight bear as tolerated in a surgical shoe.  Antibiotics: Please continue as instructed. APPTS ARE READY TO BE MADE: [x ] YES    Best Family or Patient Contact (if needed):    Additional Information about above appointments (if needed):    1:   2:   3:   Podiatry Discharge Instructions:  Follow up: Please follow up with Dr. Hope within 1 week of discharge from the hospital, please call [phone] for appointment and discuss that you recently were seen in the hospital.  Wound Care: Please leave your dressing clean dry intact until your follow up appointment.  Weight bearing: Please weight bear as tolerated in a surgical shoe.  Antibiotics: Please continue as instructed. APPTS ARE READY TO BE MADE: [X] YES    Best Family or Patient Contact (if needed):    Additional Information about above appointments (if needed):    1: Please ensure follow up with her PCP  2: Please ensure follow up with Dr. Sandoval  3:   Podiatry Discharge Instructions:  Follow up: Please follow up with Dr. Hope within 1 week of discharge from the hospital, please call [phone] for appointment and discuss that you recently were seen in the hospital.  Wound Care: Please leave your dressing clean dry intact until your follow up appointment.  Weight bearing: Please weight bear as tolerated in a surgical shoe.  Antibiotics: Please continue as instructed. APPTS ARE READY TO BE MADE: [X] YES    Best Family or Patient Contact (if needed):    Additional Information about above appointments (if needed):    1: Please ensure follow up with her PCP  2: Please ensure follow up with Dr. Sandoval  3:   Podiatry Discharge Instructions:  Follow up: Please follow up with Dr. Hope within 1 week of discharge from the hospital, please call [phone] for appointment and discuss that you recently were seen in the hospital.  Wound Care: Please leave your dressing clean dry intact until your follow up appointment.  Weight bearing: Please weight bear as tolerated in a surgical shoe.  Antibiotics: Please continue as instructed.    Prior to outreaching the patient, it was visible that the patient has secured a follow up appointment which was not scheduled by our team. Patient is scheduled with Dr. Sandoval 1/9/25 9:30am [address]    Prior to outreaching the patient, it was visible that the patient has secured a follow up appointment which was not scheduled by our team. Patient is scheduled with Dr. Hope 12/9/24 10:00am [facility]    Patient advised they did not want to proceed with scheduling appointments with the providers on their referrals. They will coordinate care on their own.

## 2024-11-22 NOTE — H&P ADULT - NSHPPHYSICALEXAM_GEN_ALL_CORE
CONSTITUTIONAL: pleasant elderly female in NAD  EYES: PERRLA; conjunctiva and sclera clear  ENMT: Moist oral mucosa, no pharyngeal injection or exudates; normal dentition, +chronic [place]  NECK: Supple, no palpable masses; no thyromegaly  RESPIRATORY: Normal respiratory effort; lungs are clear to auscultation bilaterally  CARDIOVASCULAR: Regular rate and rhythm, normal S1 and S2, no murmur/rub/gallop; No lower extremity edema  ABDOMEN: Soft, Non-distended, Nontender to palpation, normoactive bowel sounds  MUSCULOSKELETAL:  No clubbing or cyanosis of digits; s/p R BKA well-healed  PSYCH: A+O to person, place, and time; affect appropriate  NEUROLOGY: CN 2-12 are intact and symmetric; no gross sensory deficits   SKIN: No rashes; no palpable lesions, +L foot in post-op dressing

## 2024-11-22 NOTE — H&P ADULT - PROBLEM SELECTOR PLAN 7
DVT ppx: SCD to LLE    Dispo: pending CAM boot delivery, PT eval, and negative cx x 48 hours  patient uses RLE prosthesis. family to ensure is available for PT eval.

## 2024-11-22 NOTE — H&P ADULT - HISTORY OF PRESENT ILLNESS
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 Patient denies any fever, chills, chest pain, nor dyspnea. Post-op pain controlled.    Vitals in PACU stable. Admitted to medicine for further management and PT eval.

## 2024-11-22 NOTE — DISCHARGE NOTE PROVIDER - ATTENDING DISCHARGE PHYSICAL EXAMINATION:
LOS: 7d    VITALS:   T(C): 36.9 (11-29-24 @ 13:33), Max: 37.1 (11-29-24 @ 09:13)  HR: 79 (11-29-24 @ 13:33) (67 - 105)  BP: 111/66 (11-29-24 @ 13:33) (97/61 - 113/70)  RR: 18 (11-29-24 @ 13:33) (18 - 18)  SpO2: 93% (11-29-24 @ 13:33) (93% - 96%)    CONSTITUTIONAL: NAD, well-developed, well-groomed  RESPIRATORY: Normal respiratory effort; lungs are clear to auscultation bilaterally  CARDIOVASCULAR: Regular rate and rhythm, normal S1 and S2  ABDOMEN: Nontender to palpation  MUSCULOSKELETAL:  Left foot wrapped, amputation at metarsal, R BKA  PSYCH: A+O to person, place, and time;

## 2024-11-22 NOTE — BRIEF OPERATIVE NOTE - NSICDXBRIEFPREOP_GEN_ALL_CORE_FT
PRE-OP DIAGNOSIS:  Foot osteomyelitis, left 22-Nov-2024 16:12:43  Rita Bentley  Diabetic ulcer of left foot 22-Nov-2024 16:13:02  Rita Bentley  
04-Sep-2021 11:40

## 2024-11-23 LAB
A1C WITH ESTIMATED AVERAGE GLUCOSE RESULT: 5.2 % — SIGNIFICANT CHANGE UP (ref 4–5.6)
ANION GAP SERPL CALC-SCNC: 13 MMOL/L — SIGNIFICANT CHANGE UP (ref 5–17)
BUN SERPL-MCNC: 15 MG/DL — SIGNIFICANT CHANGE UP (ref 7–23)
CALCIUM SERPL-MCNC: 8.9 MG/DL — SIGNIFICANT CHANGE UP (ref 8.4–10.5)
CHLORIDE SERPL-SCNC: 109 MMOL/L — HIGH (ref 96–108)
CO2 SERPL-SCNC: 18 MMOL/L — LOW (ref 22–31)
CREAT SERPL-MCNC: 0.79 MG/DL — SIGNIFICANT CHANGE UP (ref 0.5–1.3)
EGFR: 74 ML/MIN/1.73M2 — SIGNIFICANT CHANGE UP
ESTIMATED AVERAGE GLUCOSE: 103 MG/DL — SIGNIFICANT CHANGE UP (ref 68–114)
GLUCOSE SERPL-MCNC: 66 MG/DL — LOW (ref 70–99)
HCT VFR BLD CALC: 30.2 % — LOW (ref 34.5–45)
HGB BLD-MCNC: 9.2 G/DL — LOW (ref 11.5–15.5)
MCHC RBC-ENTMCNC: 29.4 PG — SIGNIFICANT CHANGE UP (ref 27–34)
MCHC RBC-ENTMCNC: 30.5 G/DL — LOW (ref 32–36)
MCV RBC AUTO: 96.5 FL — SIGNIFICANT CHANGE UP (ref 80–100)
NRBC # BLD: 0 /100 WBCS — SIGNIFICANT CHANGE UP (ref 0–0)
PLATELET # BLD AUTO: 146 K/UL — LOW (ref 150–400)
POTASSIUM SERPL-MCNC: 3.9 MMOL/L — SIGNIFICANT CHANGE UP (ref 3.5–5.3)
POTASSIUM SERPL-SCNC: 3.9 MMOL/L — SIGNIFICANT CHANGE UP (ref 3.5–5.3)
RBC # BLD: 3.13 M/UL — LOW (ref 3.8–5.2)
RBC # FLD: 15.7 % — HIGH (ref 10.3–14.5)
SODIUM SERPL-SCNC: 140 MMOL/L — SIGNIFICANT CHANGE UP (ref 135–145)
WBC # BLD: 6.66 K/UL — SIGNIFICANT CHANGE UP (ref 3.8–10.5)
WBC # FLD AUTO: 6.66 K/UL — SIGNIFICANT CHANGE UP (ref 3.8–10.5)

## 2024-11-23 PROCEDURE — 99232 SBSQ HOSP IP/OBS MODERATE 35: CPT

## 2024-11-23 RX ORDER — POLYETHYLENE GLYCOL 3350 17 G/17G
17 POWDER, FOR SOLUTION ORAL DAILY
Refills: 0 | Status: DISCONTINUED | OUTPATIENT
Start: 2024-11-23 | End: 2024-11-25

## 2024-11-23 RX ORDER — OXYCODONE HYDROCHLORIDE 30 MG/1
5 TABLET ORAL EVERY 4 HOURS
Refills: 0 | Status: DISCONTINUED | OUTPATIENT
Start: 2024-11-23 | End: 2024-11-29

## 2024-11-23 RX ORDER — ACETAMINOPHEN 500MG 500 MG/1
650 TABLET, COATED ORAL EVERY 6 HOURS
Refills: 0 | Status: COMPLETED | OUTPATIENT
Start: 2024-11-23 | End: 2024-11-28

## 2024-11-23 RX ORDER — SENNOSIDES 8.6 MG
2 TABLET ORAL AT BEDTIME
Refills: 0 | Status: DISCONTINUED | OUTPATIENT
Start: 2024-11-23 | End: 2024-11-29

## 2024-11-23 RX ORDER — ONDANSETRON HYDROCHLORIDE 4 MG/1
4 TABLET, FILM COATED ORAL ONCE
Refills: 0 | Status: COMPLETED | OUTPATIENT
Start: 2024-11-23 | End: 2024-11-23

## 2024-11-23 RX ADMIN — HYDROXYCHLOROQUINE SULFATE 200 MILLIGRAM(S): 200 TABLET, FILM COATED ORAL at 05:54

## 2024-11-23 RX ADMIN — HYDROMORPHONE HYDROCHLORIDE 0.5 MILLIGRAM(S): 2 TABLET ORAL at 05:10

## 2024-11-23 RX ADMIN — Medication 1 MILLIGRAM(S): at 13:03

## 2024-11-23 RX ADMIN — HYDROMORPHONE HYDROCHLORIDE 0.5 MILLIGRAM(S): 2 TABLET ORAL at 09:31

## 2024-11-23 RX ADMIN — ACETAMINOPHEN 500MG 650 MILLIGRAM(S): 500 TABLET, COATED ORAL at 17:03

## 2024-11-23 RX ADMIN — Medication 1 TABLET(S): at 13:03

## 2024-11-23 RX ADMIN — HYDROMORPHONE HYDROCHLORIDE 0.5 MILLIGRAM(S): 2 TABLET ORAL at 00:05

## 2024-11-23 RX ADMIN — HYDROMORPHONE HYDROCHLORIDE 0.5 MILLIGRAM(S): 2 TABLET ORAL at 04:10

## 2024-11-23 RX ADMIN — HYDROXYCHLOROQUINE SULFATE 200 MILLIGRAM(S): 200 TABLET, FILM COATED ORAL at 17:03

## 2024-11-23 RX ADMIN — HYDROMORPHONE HYDROCHLORIDE 0.5 MILLIGRAM(S): 2 TABLET ORAL at 10:31

## 2024-11-23 RX ADMIN — HYDROMORPHONE HYDROCHLORIDE 0.5 MILLIGRAM(S): 2 TABLET ORAL at 01:05

## 2024-11-23 RX ADMIN — Medication 112 MICROGRAM(S): at 05:54

## 2024-11-23 RX ADMIN — ONDANSETRON HYDROCHLORIDE 4 MILLIGRAM(S): 4 TABLET, FILM COATED ORAL at 08:37

## 2024-11-23 RX ADMIN — POLYETHYLENE GLYCOL 3350 17 GRAM(S): 17 POWDER, FOR SOLUTION ORAL at 13:04

## 2024-11-23 RX ADMIN — ACETAMINOPHEN 500MG 650 MILLIGRAM(S): 500 TABLET, COATED ORAL at 13:04

## 2024-11-23 RX ADMIN — Medication 325 MILLIGRAM(S): at 13:03

## 2024-11-23 RX ADMIN — ACETAMINOPHEN 500MG 650 MILLIGRAM(S): 500 TABLET, COATED ORAL at 18:03

## 2024-11-23 RX ADMIN — ACETAMINOPHEN 500MG 650 MILLIGRAM(S): 500 TABLET, COATED ORAL at 15:04

## 2024-11-23 RX ADMIN — OXYCODONE HYDROCHLORIDE 5 MILLIGRAM(S): 30 TABLET ORAL at 15:53

## 2024-11-23 RX ADMIN — PANTOPRAZOLE SODIUM 40 MILLIGRAM(S): 40 TABLET, DELAYED RELEASE ORAL at 05:54

## 2024-11-23 RX ADMIN — Medication 1000 MICROGRAM(S): at 13:03

## 2024-11-23 RX ADMIN — OXYCODONE HYDROCHLORIDE 5 MILLIGRAM(S): 30 TABLET ORAL at 23:06

## 2024-11-23 RX ADMIN — OXYCODONE HYDROCHLORIDE 5 MILLIGRAM(S): 30 TABLET ORAL at 14:53

## 2024-11-23 RX ADMIN — Medication 2 TABLET(S): at 22:16

## 2024-11-23 RX ADMIN — Medication 10 MILLIGRAM(S): at 22:21

## 2024-11-23 NOTE — PHYSICAL THERAPY INITIAL EVALUATION ADULT - RANGE OF MOTION EXAMINATION, REHAB EVAL
bilateral upper extremity ROM was WFL (within functional limits)/bilateral lower extremity ROM was WFL (within functional limits) right BKA, left foot/ankle not tested/bilateral upper extremity ROM was WFL (within functional limits)/bilateral lower extremity ROM was WFL (within functional limits)

## 2024-11-23 NOTE — PHYSICAL THERAPY INITIAL EVALUATION ADULT - PERTINENT HX OF CURRENT PROBLEM, REHAB EVAL
as per chart review: pmhx of Anemia , Raynaud's disease, CREST syndrome on PVD, S/p right BKA, h/o afib not on AC, GIB due to Antiplatelet medication (2022), PVD, Chronic left toe wound,   Non pressure chronic ulcer other part of left foot with necrosis of bone s/p Amputation of left foot 2nd digit with dermal skin application in 3/24 . Denies foot pain, cp, sob, fevers. Presents to PST for scheduled Left foot metatarsal amputation with Achilles Lengthening on 11/22/24

## 2024-11-23 NOTE — PHYSICAL THERAPY INITIAL EVALUATION ADULT - MANUAL MUSCLE TESTING RESULTS, REHAB EVAL
grossly at least 3/5 throughout grossly at least 3/5 throughout; right BKA, left foot/ankle not tested

## 2024-11-23 NOTE — PHYSICAL THERAPY INITIAL EVALUATION ADULT - LIVES WITH, PROFILE
in assisted living facility with aide 7 days/week in private home, with aide 7 days/week,1 stair to enter normally carried into home in wheelchair

## 2024-11-23 NOTE — PHYSICAL THERAPY INITIAL EVALUATION ADULT - GAIT TRAINING, PT EVAL
Patient will ambulate 100 feet with contact guard x 1 with appropriate assistive device as needed, in 4 weeks.

## 2024-11-23 NOTE — PROGRESS NOTE ADULT - SUBJECTIVE AND OBJECTIVE BOX
Patient is a 84y old  Female who presents with a chief complaint of chronic left osteomyelitis (22 Nov 2024 16:59)       INTERVAL HPI/OVERNIGHT EVENTS:  Patient seen and evaluated at bedside.  Pt is resting comfortable in NAD. Denies N/V/F/C.    Allergies    No Known Allergies    Intolerances        Vital Signs Last 24 Hrs  T(C): 36.5 (23 Nov 2024 09:32), Max: 36.9 (23 Nov 2024 05:46)  T(F): 97.7 (23 Nov 2024 09:32), Max: 98.4 (23 Nov 2024 05:46)  HR: 87 (23 Nov 2024 09:32) (54 - 87)  BP: 93/54 (23 Nov 2024 09:32) (93/54 - 137/63)  BP(mean): 86 (22 Nov 2024 18:30) (86 - 90)  RR: 18 (23 Nov 2024 09:32) (14 - 18)  SpO2: 92% (23 Nov 2024 09:32) (92% - 100%)    Parameters below as of 23 Nov 2024 09:32  Patient On (Oxygen Delivery Method): room air        LABS:                        9.2    6.66  )-----------( 146      ( 23 Nov 2024 07:28 )             30.2     11-23    140  |  109[H]  |  15  ----------------------------<  66[L]  3.9   |  18[L]  |  0.79    Ca    8.9      23 Nov 2024 07:28        Urinalysis Basic - ( 23 Nov 2024 07:28 )    Color: x / Appearance: x / SG: x / pH: x  Gluc: 66 mg/dL / Ketone: x  / Bili: x / Urobili: x   Blood: x / Protein: x / Nitrite: x   Leuk Esterase: x / RBC: x / WBC x   Sq Epi: x / Non Sq Epi: x / Bacteria: x      CAPILLARY BLOOD GLUCOSE          Lower Extremity Physical Exam:  Vascular: DP/PT 0/4 B/L, CFT <3sec x 10, Temp gradient warm to cool B/L  Neurology: Epicritic sensation diminished to level of digits, B/L  Musculoskeletal/Ortho: unremarkable   Skin: 11/22 s/p left foot transmetatarsal amputation with tendoachilles lengthening, closed: sutures and staples intact, moderate sanguinous drainage, penrose drain intact, flaps cold with ischemic changes. No open wounds or       RADIOLOGY & ADDITIONAL TESTS:

## 2024-11-23 NOTE — PHYSICAL THERAPY INITIAL EVALUATION ADULT - ADDITIONAL COMMENTS
Adult General


Chief Complaint


Chief Complaint:  COUGH





HPI


HPI





Patient is a 14  year old female presents to the ED complaining of sore throat 

x 3 days. Describes the pain as sharp. Rates the pain as 5 out of 10. Pain with 

swallowing. Associated symptoms include rhinorrhea and ear pain. Sick contacts 

with similar symptoms. Denies cough, headache, fever, nausea/vomiting, chest 

pain, shortness of breath, rash or conjunctivitis.





Review of Systems


Review of Systems





Constitutional: Denies fever or chills []


Eyes: Denies change in visual acuity, redness, or eye pain []


HENT: Complains of rhinorrhea, sore throat and ear pain.


Respiratory: Denies cough or shortness of breath []


Cardiovascular: No additional information not addressed in HPI []


GI: Denies abdominal pain, nausea, vomiting, bloody stools or diarrhea []


: Denies dysuria or hematuria []


Musculoskeletal: Denies back pain or joint pain []


Integument: Denies rash or skin lesions []


Neurologic: Denies headache, focal weakness or sensory changes []








All other systems were reviewed and found to be within normal limits, except as 

documented in this note.





Allergies


Allergies





Allergies








Coded Allergies Type Severity Reaction Last Updated Verified


 


  amoxicillin Allergy Intermediate  8/19/18 Yes











Physical Exam


Physical Exam





Constitutional: Well developed, well nourished, no acute distress, non-toxic 

appearance. []


HENT: Normocephalic, atraumatic, bilateral external ears normal, oropharynx 

moist, mild pharyngeal erythema. uvula midline. no oral exudates, nose normal. [

]


Eyes: PERRLA, EOMI, conjunctiva normal, no discharge. [] 


Neck: Normal range of motion, no tenderness, supple, no stridor. [] 


Cardiovascular:Heart rate regular rhythm, no murmur []


Lungs & Thorax:  Bilateral breath sounds clear to auscultation []


Abdomen: Bowel sounds normal, soft, no tenderness, no masses, no pulsatile 

masses. [] 


Skin: Warm, dry, no erythema, no rash. [] 


Neurologic: Alert and oriented X 3, normal motor function, normal sensory 

function, no focal deficits noted. []


Psychologic: Affect normal, judgement normal, mood normal. []





Current Patient Data


Vital Signs





 Vital Signs








  Date Time  Temp Pulse Resp B/P (MAP) Pulse Ox O2 Delivery O2 Flow Rate FiO2


 


8/19/18 10:10 98.7  16  98   





 98.7       








Lab Values





 Laboratory Tests








Test


 8/19/18


10:18


 


Group A Streptococcus Rapid


 Negative


(NEGATIVE)











EKG


EKG


[]





Radiology/Procedures


Radiology/Procedures


[]





Course & Med Decision Making


Course & Med Decision Making


Pertinent Labs and Imaging studies reviewed. (See chart for details)





[]





Dragon Disclaimer


Dragon Disclaimer


This electronic medical record was generated, in whole or in part, using a 

voice recognition dictation system.





Departure


Departure


Impression:  


 Primary Impression:  


 Sore throat


Disposition:  01 HOME, SELF-CARE


Condition:  IMPROVED


Referrals:  


CLAUDIA STAFFORD MD (PCP)


Patient Instructions:  Sore Throat


Scripts


Methylprednisolone (MEDROL) 4 Mg Tab.ds.pk


1 PKG PO UD, #1 PKG


   Prov: GINNY KWONG         8/19/18











GINNY KWONG Aug 19, 2018 10:26
minimal ambulation with assistance of aide

## 2024-11-23 NOTE — PROGRESS NOTE ADULT - ASSESSMENT
84F s/p left foot transmetatarsal amputation w/ tendoachilles tendon lengthening, closed (DOS 11/22)   - Patient seen and evaluated  - Afebrile, no leukocytosis   - 11/22 s/p left foot transmetatarsal amputation with tendoachilles lengthening, closed: sutures and staples intact, moderate sanguinous drainage, penrose drain intact, flaps cold with ischemic changes. No open wounds or   - Intraop findings: low concern for residual infection/ high concern for viability   - OR data pending   - Recommend vasc consult   - Pod stable for discharge pending clean bone margin no growth x 48hours/ final vasc recs   - Follow up information in discharge note provider   - Discussed with attending

## 2024-11-23 NOTE — PROGRESS NOTE ADULT - SUBJECTIVE AND OBJECTIVE BOX
PROGRESS NOTE:   Authored by Dr. Luis Marques MD, Available on MS Teams    Patient is a 84y old  Female who presents with a chief complaint of chronic left osteomyelitis (23 Nov 2024 12:54)      SUBJECTIVE / OVERNIGHT EVENTS: Patient has nausea, had an episode of vomiting earlier. Also is constipated     ADDITIONAL REVIEW OF SYSTEMS:    MEDICATIONS  (STANDING):  acetaminophen     Tablet .. 650 milliGRAM(s) Oral every 6 hours  amLODIPine   Tablet 5 milliGRAM(s) Oral daily  atorvastatin 10 milliGRAM(s) Oral at bedtime  cyanocobalamin 1000 MICROGram(s) Oral daily  ferrous    sulfate 325 milliGRAM(s) Oral daily  folic acid 1 milliGRAM(s) Oral daily  hydroxychloroquine 200 milliGRAM(s) Oral two times a day  levothyroxine 112 MICROGram(s) Oral daily  multivitamin 1 Tablet(s) Oral daily  pantoprazole    Tablet 40 milliGRAM(s) Oral before breakfast  polyethylene glycol 3350 17 Gram(s) Oral daily  senna 2 Tablet(s) Oral at bedtime    MEDICATIONS  (PRN):  HYDROmorphone  Injectable 0.5 milliGRAM(s) IV Push every 4 hours PRN Severe Pain (7 - 10)  oxyCODONE    IR 5 milliGRAM(s) Oral every 4 hours PRN Severe Pain (7 - 10)      CAPILLARY BLOOD GLUCOSE        I&O's Summary    22 Nov 2024 07:01  -  23 Nov 2024 07:00  --------------------------------------------------------  IN: 0 mL / OUT: 800 mL / NET: -800 mL    23 Nov 2024 07:01  -  23 Nov 2024 16:32  --------------------------------------------------------  IN: 240 mL / OUT: 0 mL / NET: 240 mL        PHYSICAL EXAM:  Vital Signs Last 24 Hrs  T(C): 36.8 (23 Nov 2024 13:34), Max: 36.9 (23 Nov 2024 05:46)  T(F): 98.2 (23 Nov 2024 13:34), Max: 98.4 (23 Nov 2024 05:46)  HR: 87 (23 Nov 2024 13:34) (58 - 87)  BP: 93/57 (23 Nov 2024 13:34) (93/54 - 137/63)  BP(mean): 86 (22 Nov 2024 18:30) (86 - 90)  RR: 18 (23 Nov 2024 13:34) (14 - 18)  SpO2: 92% (23 Nov 2024 13:34) (92% - 100%)    Parameters below as of 23 Nov 2024 13:34  Patient On (Oxygen Delivery Method): room air        CONSTITUTIONAL: NAD  RESPIRATORY: Normal respiratory effort; lungs are clear to auscultation bilaterally  CARDIOVASCULAR: Regular rate and rhythm, normal S1 and S2, no murmur/rub/gallop; No lower extremity edema  ABDOMEN: Nontender to palpation, normoactive bowel sounds, no rebound/guarding  MUSCLOSKELETAL: no clubbing or cyanosis of digits; R BKA, L foot in dressing  PSYCH: A+O to person, place, and time; affect appropriate    LABS:                        9.2    6.66  )-----------( 146      ( 23 Nov 2024 07:28 )             30.2     11-23    140  |  109[H]  |  15  ----------------------------<  66[L]  3.9   |  18[L]  |  0.79    Ca    8.9      23 Nov 2024 07:28            Urinalysis Basic - ( 23 Nov 2024 07:28 )    Color: x / Appearance: x / SG: x / pH: x  Gluc: 66 mg/dL / Ketone: x  / Bili: x / Urobili: x   Blood: x / Protein: x / Nitrite: x   Leuk Esterase: x / RBC: x / WBC x   Sq Epi: x / Non Sq Epi: x / Bacteria: x        Culture - Tissue with Gram Stain (collected 22 Nov 2024 17:09)  Source: Tissue  Gram Stain (23 Nov 2024 05:59):    No polymorphonuclear cells seen per low power field    No organisms seen per oil power field

## 2024-11-23 NOTE — PHYSICAL THERAPY INITIAL EVALUATION ADULT - NSPTDISCHREC_GEN_A_CORE
patient near baseline activity status/Home PT patient near baseline activity status, has 24 hour aide/Home PT

## 2024-11-24 LAB
ANION GAP SERPL CALC-SCNC: 13 MMOL/L — SIGNIFICANT CHANGE UP (ref 5–17)
BUN SERPL-MCNC: 20 MG/DL — SIGNIFICANT CHANGE UP (ref 7–23)
CALCIUM SERPL-MCNC: 9 MG/DL — SIGNIFICANT CHANGE UP (ref 8.4–10.5)
CHLORIDE SERPL-SCNC: 104 MMOL/L — SIGNIFICANT CHANGE UP (ref 96–108)
CO2 SERPL-SCNC: 20 MMOL/L — LOW (ref 22–31)
CREAT SERPL-MCNC: 1.02 MG/DL — SIGNIFICANT CHANGE UP (ref 0.5–1.3)
EGFR: 54 ML/MIN/1.73M2 — LOW
GLUCOSE SERPL-MCNC: 106 MG/DL — HIGH (ref 70–99)
HCT VFR BLD CALC: 30.4 % — LOW (ref 34.5–45)
HGB BLD-MCNC: 9.3 G/DL — LOW (ref 11.5–15.5)
MAGNESIUM SERPL-MCNC: 1.9 MG/DL — SIGNIFICANT CHANGE UP (ref 1.6–2.6)
MCHC RBC-ENTMCNC: 28.7 PG — SIGNIFICANT CHANGE UP (ref 27–34)
MCHC RBC-ENTMCNC: 30.6 G/DL — LOW (ref 32–36)
MCV RBC AUTO: 93.8 FL — SIGNIFICANT CHANGE UP (ref 80–100)
NRBC # BLD: 0 /100 WBCS — SIGNIFICANT CHANGE UP (ref 0–0)
PHOSPHATE SERPL-MCNC: 2.9 MG/DL — SIGNIFICANT CHANGE UP (ref 2.5–4.5)
PLATELET # BLD AUTO: 136 K/UL — LOW (ref 150–400)
POTASSIUM SERPL-MCNC: 4.1 MMOL/L — SIGNIFICANT CHANGE UP (ref 3.5–5.3)
POTASSIUM SERPL-SCNC: 4.1 MMOL/L — SIGNIFICANT CHANGE UP (ref 3.5–5.3)
RBC # BLD: 3.24 M/UL — LOW (ref 3.8–5.2)
RBC # FLD: 15.7 % — HIGH (ref 10.3–14.5)
SODIUM SERPL-SCNC: 137 MMOL/L — SIGNIFICANT CHANGE UP (ref 135–145)
WBC # BLD: 7.79 K/UL — SIGNIFICANT CHANGE UP (ref 3.8–10.5)
WBC # FLD AUTO: 7.79 K/UL — SIGNIFICANT CHANGE UP (ref 3.8–10.5)

## 2024-11-24 PROCEDURE — 99223 1ST HOSP IP/OBS HIGH 75: CPT | Mod: FS

## 2024-11-24 PROCEDURE — 99222 1ST HOSP IP/OBS MODERATE 55: CPT | Mod: FS

## 2024-11-24 PROCEDURE — 99233 SBSQ HOSP IP/OBS HIGH 50: CPT

## 2024-11-24 RX ORDER — CEFAZOLIN SODIUM 10 G
VIAL (EA) INJECTION
Refills: 0 | Status: DISCONTINUED | OUTPATIENT
Start: 2024-11-24 | End: 2024-11-24

## 2024-11-24 RX ORDER — MEROPENEM 500 MG/1
1000 INJECTION, POWDER, FOR SOLUTION INTRAVENOUS EVERY 8 HOURS
Refills: 0 | Status: DISCONTINUED | OUTPATIENT
Start: 2024-11-24 | End: 2024-11-24

## 2024-11-24 RX ORDER — CEFAZOLIN SODIUM 10 G
2000 VIAL (EA) INJECTION ONCE
Refills: 0 | Status: COMPLETED | OUTPATIENT
Start: 2024-11-24 | End: 2024-11-24

## 2024-11-24 RX ORDER — CEFEPIME 2 G/1
1000 INJECTION, POWDER, FOR SOLUTION INTRAVENOUS EVERY 8 HOURS
Refills: 0 | Status: DISCONTINUED | OUTPATIENT
Start: 2024-11-24 | End: 2024-11-29

## 2024-11-24 RX ORDER — CEFAZOLIN SODIUM 10 G
2000 VIAL (EA) INJECTION EVERY 8 HOURS
Refills: 0 | Status: DISCONTINUED | OUTPATIENT
Start: 2024-11-24 | End: 2024-11-24

## 2024-11-24 RX ORDER — MEROPENEM 500 MG/1
INJECTION, POWDER, FOR SOLUTION INTRAVENOUS
Refills: 0 | Status: DISCONTINUED | OUTPATIENT
Start: 2024-11-24 | End: 2024-11-24

## 2024-11-24 RX ORDER — MEROPENEM 500 MG/1
1000 INJECTION, POWDER, FOR SOLUTION INTRAVENOUS ONCE
Refills: 0 | Status: COMPLETED | OUTPATIENT
Start: 2024-11-24 | End: 2024-11-24

## 2024-11-24 RX ADMIN — Medication 325 MILLIGRAM(S): at 13:00

## 2024-11-24 RX ADMIN — Medication 2 TABLET(S): at 21:40

## 2024-11-24 RX ADMIN — OXYCODONE HYDROCHLORIDE 5 MILLIGRAM(S): 30 TABLET ORAL at 11:25

## 2024-11-24 RX ADMIN — ACETAMINOPHEN 500MG 650 MILLIGRAM(S): 500 TABLET, COATED ORAL at 14:00

## 2024-11-24 RX ADMIN — PANTOPRAZOLE SODIUM 40 MILLIGRAM(S): 40 TABLET, DELAYED RELEASE ORAL at 05:51

## 2024-11-24 RX ADMIN — Medication 100 MILLIGRAM(S): at 08:58

## 2024-11-24 RX ADMIN — OXYCODONE HYDROCHLORIDE 5 MILLIGRAM(S): 30 TABLET ORAL at 21:40

## 2024-11-24 RX ADMIN — ACETAMINOPHEN 500MG 650 MILLIGRAM(S): 500 TABLET, COATED ORAL at 05:51

## 2024-11-24 RX ADMIN — Medication 1 TABLET(S): at 13:01

## 2024-11-24 RX ADMIN — HYDROXYCHLOROQUINE SULFATE 200 MILLIGRAM(S): 200 TABLET, FILM COATED ORAL at 05:51

## 2024-11-24 RX ADMIN — Medication 112 MICROGRAM(S): at 05:51

## 2024-11-24 RX ADMIN — POLYETHYLENE GLYCOL 3350 17 GRAM(S): 17 POWDER, FOR SOLUTION ORAL at 13:01

## 2024-11-24 RX ADMIN — OXYCODONE HYDROCHLORIDE 5 MILLIGRAM(S): 30 TABLET ORAL at 17:31

## 2024-11-24 RX ADMIN — Medication 10 MILLIGRAM(S): at 21:40

## 2024-11-24 RX ADMIN — ACETAMINOPHEN 500MG 650 MILLIGRAM(S): 500 TABLET, COATED ORAL at 13:00

## 2024-11-24 RX ADMIN — OXYCODONE HYDROCHLORIDE 5 MILLIGRAM(S): 30 TABLET ORAL at 22:40

## 2024-11-24 RX ADMIN — Medication 100 MILLIGRAM(S): at 13:00

## 2024-11-24 RX ADMIN — ACETAMINOPHEN 500MG 650 MILLIGRAM(S): 500 TABLET, COATED ORAL at 00:22

## 2024-11-24 RX ADMIN — CEFEPIME 100 MILLIGRAM(S): 2 INJECTION, POWDER, FOR SOLUTION INTRAVENOUS at 21:40

## 2024-11-24 RX ADMIN — Medication 1000 MICROGRAM(S): at 13:00

## 2024-11-24 RX ADMIN — HYDROXYCHLOROQUINE SULFATE 200 MILLIGRAM(S): 200 TABLET, FILM COATED ORAL at 17:52

## 2024-11-24 RX ADMIN — OXYCODONE HYDROCHLORIDE 5 MILLIGRAM(S): 30 TABLET ORAL at 00:06

## 2024-11-24 RX ADMIN — Medication 1 MILLIGRAM(S): at 13:00

## 2024-11-24 RX ADMIN — OXYCODONE HYDROCHLORIDE 5 MILLIGRAM(S): 30 TABLET ORAL at 10:25

## 2024-11-24 RX ADMIN — OXYCODONE HYDROCHLORIDE 5 MILLIGRAM(S): 30 TABLET ORAL at 16:31

## 2024-11-24 RX ADMIN — ACETAMINOPHEN 500MG 650 MILLIGRAM(S): 500 TABLET, COATED ORAL at 17:52

## 2024-11-24 NOTE — CONSULT NOTE ADULT - NS ATTEND AMEND GEN_ALL_CORE FT
84-year-old female with a past medical history of Raynaud's disease, crest syndrome, peripheral vascular disease status post right BKA, atrial fibrillation not on anticoagulation due to GI bleed, chronic left toe wound, amputation of the left second digit who was admitted to the hospital due to a previously scheduled left foot metatarsal amputation with Achilles lengthening.    Patient with no systemic or localizing symptoms on admission.  Underwent left foot metatarsal amputation and Achilles lengthening on 11/22/2024.  Low concern for residual bone infection, high concern for viability.    Patient admitted for further management postoperatively.  ID now consulted due to positive clean bone margin culture growing Serratia, strep agalactiae, Pseudomonas.    Since admission, patient has been afebrile.  Otherwise hemodynamically stable on room air.  Latest labs show no leukocytosis, anemia 9.3/30.4, BMP with renal function within GFR 54.    #Left foot wound status post transmetatarsal amputation with Achilles lengthening on 11/22/2024  #History of crest syndrome/Raynaud's on hydroxychloroquine  #PVD    Recommendations  Patient with postoperative clean margin cultures positive  Polymicrobial growth in cultures  Serratia and strep and clean margins–cultures pending  Previous Pseudomonas growth as well  Will discontinue cefazolin  Start cefepime  Discussed with patient and son rationale for possible further surgery for source control versus IV antibiotics for 6 weeks  Surgical planning per podiatry  Follow fever curve and WBC count    Bryant Sandoval MD  Division of Infectious Diseases

## 2024-11-24 NOTE — CONSULT NOTE ADULT - ASSESSMENT
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 Patient is now s/p left foot transmetatarsal amputation  POD2 with tendo achilles lengthening per op note Low concern for residual bone infection, bone at proximal resection was of good quality High concern for viability. Clean bone margin: Serratia marcescens Step agalactiae, pseudomonas - OR wound culture: Serratia marsecens, Step agalactiae, and ID consulted for abx mgnt.    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 Patient is now s/p left foot transmetatarsal amputation  POD2 with tendo achilles lengthening per op note Low concern for residual bone infection, bone at proximal resection was of good quality High concern for viability. Clean bone margin: Serratia marcescens Step agalactiae, pseudomonas - OR wound culture: Serratia marsecens, Step agalactiae, and ID consulted for abx mgnt.    Afebrile  WBC wnl  11/22-Clean bone margin: Serratia marcescens Step agalactiae, pseudomonas, OR wound culture: Serratia marsecens, Step agalactiae,  Currently on Cefazolin 11/24-->    # s/p left foot transmetatarsal amputation with achilles lengthening 11/22  #CREST syndrome  #Raynaud's disease on plaquenil  #severe PVD    Plan  Overall patient s/p LTMA now with + OR cultures   D/c Cefazolin   Start meropenem 1 gram q 8h  Podiatry f/u regarding any more plans for more surgery to left foot?  Follow up sensitivity to OR Cultures  Continue to monitor for fever  Continue to trend WBC  Plan d/w Dr. 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 Patient is now s/p left foot transmetatarsal amputation  POD2 with tendo achilles lengthening per op note Low concern for residual bone infection, bone at proximal resection was of good quality High concern for viability. Clean bone margin: Serratia marcescens Step agalactiae, pseudomonas - OR wound culture: Serratia marsecens, Step agalactiae, and ID consulted for abx mgnt.    Afebrile  WBC wnl  11/22-Clean bone margin: Serratia marcescens Step agalactiae, pseudomonas, OR wound culture: Serratia marsecens, Step agalactiae,  Currently on Cefazolin 11/24-->    # s/p left foot transmetatarsal amputation with achilles lengthening 11/22  #CREST syndrome  #Raynaud's disease on plaquenil  #severe PVD    Plan  Overall patient s/p LTMA now with + OR cultures   D/c Cefazolin   Start cefepime1 gram q 8h  Podiatry f/u regarding any more plans for more surgery to left foot?  Follow up sensitivity to OR Cultures  Continue to monitor for fever  Continue to trend WBC  Plan d/w Dr. Sandoval and floor provider

## 2024-11-24 NOTE — PROVIDER CONTACT NOTE (CRITICAL VALUE NOTIFICATION) - SITUATION
[None] : no edema [Normal] : normal bowel sounds, non-tender, no masses, soft, no no hepato-splenomegaly [Cervical Lymph Nodes Enlarged Posterior Bilaterally] : no posterior cervical lymphadenopathy [Supraclavicular Lymph Nodes Enlarged Bilaterally] : no supraclavicular lymphadenopathy Tissue culture rare serratia marcscens, rare strep B

## 2024-11-24 NOTE — PROGRESS NOTE ADULT - SUBJECTIVE AND OBJECTIVE BOX
PROGRESS NOTE:   Authored by Dr. Luis Marques MD, Available on MS Teams    Patient is a 84y old  Female who presents with a chief complaint of chronic left osteomyelitis (24 Nov 2024 09:47)      SUBJECTIVE / OVERNIGHT EVENTS:    ADDITIONAL REVIEW OF SYSTEMS:    MEDICATIONS  (STANDING):  acetaminophen     Tablet .. 650 milliGRAM(s) Oral every 6 hours  amLODIPine   Tablet 5 milliGRAM(s) Oral daily  atorvastatin 10 milliGRAM(s) Oral at bedtime  ceFAZolin   IVPB      ceFAZolin   IVPB 2000 milliGRAM(s) IV Intermittent every 8 hours  cyanocobalamin 1000 MICROGram(s) Oral daily  ferrous    sulfate 325 milliGRAM(s) Oral daily  folic acid 1 milliGRAM(s) Oral daily  hydroxychloroquine 200 milliGRAM(s) Oral two times a day  levothyroxine 112 MICROGram(s) Oral daily  multivitamin 1 Tablet(s) Oral daily  pantoprazole    Tablet 40 milliGRAM(s) Oral before breakfast  polyethylene glycol 3350 17 Gram(s) Oral daily  senna 2 Tablet(s) Oral at bedtime    MEDICATIONS  (PRN):  HYDROmorphone  Injectable 0.5 milliGRAM(s) IV Push every 4 hours PRN Severe Pain (7 - 10)  oxyCODONE    IR 5 milliGRAM(s) Oral every 4 hours PRN Severe Pain (7 - 10)      CAPILLARY BLOOD GLUCOSE        I&O's Summary    23 Nov 2024 07:01  -  24 Nov 2024 07:00  --------------------------------------------------------  IN: 480 mL / OUT: 450 mL / NET: 30 mL        PHYSICAL EXAM:  Vital Signs Last 24 Hrs  T(C): 37.2 (24 Nov 2024 08:53), Max: 37.6 (24 Nov 2024 01:35)  T(F): 98.9 (24 Nov 2024 08:53), Max: 99.6 (24 Nov 2024 01:35)  HR: 87 (24 Nov 2024 08:53) (75 - 87)  BP: 118/65 (24 Nov 2024 08:53) (92/53 - 118/65)  BP(mean): --  RR: 18 (24 Nov 2024 08:53) (18 - 18)  SpO2: 92% (24 Nov 2024 08:53) (91% - 93%)    Parameters below as of 24 Nov 2024 08:53  Patient On (Oxygen Delivery Method): room air        CONSTITUTIONAL: NAD  RESPIRATORY: Normal respiratory effort; lungs are clear to auscultation bilaterally  CARDIOVASCULAR: Regular rate and rhythm, normal S1 and S2, no murmur/rub/gallop; No lower extremity edema  ABDOMEN: Nontender to palpation, normoactive bowel sounds, no rebound/guarding  MUSCLOSKELETAL: R BKA, L foot in dressing  PSYCH: A+O to person, place, and time; affect appropriate    LABS:                        9.3    7.79  )-----------( 136      ( 24 Nov 2024 07:54 )             30.4     11-24    137  |  104  |  20  ----------------------------<  106[H]  4.1   |  20[L]  |  1.02    Ca    9.0      24 Nov 2024 07:55  Phos  2.9     11-24  Mg     1.9     11-24            Urinalysis Basic - ( 24 Nov 2024 07:55 )    Color: x / Appearance: x / SG: x / pH: x  Gluc: 106 mg/dL / Ketone: x  / Bili: x / Urobili: x   Blood: x / Protein: x / Nitrite: x   Leuk Esterase: x / RBC: x / WBC x   Sq Epi: x / Non Sq Epi: x / Bacteria: x        Culture - Tissue with Gram Stain (collected 22 Nov 2024 17:09)  Source: Tissue  Gram Stain (23 Nov 2024 23:18):    No polymorphonuclear cells seen per low power field    No organisms seen per oil power field  Preliminary Report (23 Nov 2024 23:18):    Rare Serratia marcescens    Rare Streptococcus agalactiae (Group B)    Group B streptococci are susceptible to ampicillin,    penicillin and cefazolin, but may be resistant to    erythromycin and clindamycin.    Culture - Wound Aerobic/Anaerobic (collected 22 Nov 2024 17:03)  Source: .Surgical Swab  Preliminary Report (23 Nov 2024 23:20):    Moderate Serratia marcescens    Few Pseudomonas aeruginosa    Rare Streptococcus agalactiae (Group B)    Group B streptococci are susceptible to ampicillin,    penicillin and cefazolin, but may be resistant to    erythromycin and clindamycin.

## 2024-11-24 NOTE — PROGRESS NOTE ADULT - ASSESSMENT
84F s/p left foot transmetatarsal amputation w/ tendoachilles tendon lengthening, closed (DOS 11/22)   - Patient seen and evaluated  - Afebrile, no leukocytosis   - 11/22 s/p left foot transmetatarsal amputation with tendoachilles lengthening, closed: sutures and staples intact, moderate sanguinous drainage, penrose drain intact, flaps cold with ischemic changes. No open wounds or   - Intraop findings: low concern for residual infection/ high concern for viability   - Clean bone margin: Serratia marcescens Step agalactiae, pseudomonas   - OR wound culture: Serratia marsecens, Step agalactiae, pseudomonas   - Recommend vasc consult   - Recommend ID consult   - Pod stable for discharge pending final vasc recs/ final ID recs   - Follow up information in discharge note provider   - Seen with attending    84F s/p left foot transmetatarsal amputation w/ tendoachilles tendon lengthening, closed (DOS 11/22)   - Patient seen and evaluated  - Afebrile, no leukocytosis   - 11/22 s/p left foot transmetatarsal amputation with tendoachilles lengthening, closed: sutures and staples intact, moderate sanguinous drainage, flaps cold with ischemic changes.   - Intraop findings: low concern for residual infection/ high concern for viability   - Clean bone margin: Serratia marcescens Step agalactiae, pseudomonas   - OR wound culture: Serratia marsecens, Step agalactiae, pseudomonas   - After obtaining verbal consent penrose drain removed bedside. Patient tolerated procedure well.   - Recommend vasc consult   - Recommend ID consult   - Pod stable for discharge pending final vasc recs/ final ID recs   - Follow up information in discharge note provider   - Seen with attending

## 2024-11-24 NOTE — CONSULT NOTE ADULT - ASSESSMENT
85 yo female with PMH of Raynaud's disease, CREST syndrome, PVD, s/p RLE stent placement in 03/2022 at [place], s/p R BKA in 06/2022, s/p  in [facility], s/p Left TMA with achilles tendon lengthening on 11/22/24. Vascular surgery consulted for c/f viability of LLE foot incision.    Plan:  - recommend BARRY/PVR of LLE  - pain control PRN  - appreciate podiatry recs  - appreciate care per primary team    Plan Discussed with Vascular Surgery Fellow on behalf of Dr. Millan    Vascular Surgery   [phone]

## 2024-11-24 NOTE — CONSULT NOTE ADULT - SUBJECTIVE AND OBJECTIVE BOX
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 Patient denies any fever, chills, chest pain, nor dyspnea. Post-op pain controlled.    Vitals in PACU stable. Admitted to medicine for further management and PT eval. (22 Nov 2024 16:59)     REVIEW OF SYSTEMS  [  ] ROS unobtainable because:    [ x ] All other systems negative except as noted below  Constitutional:  [ ] fever [ ] chills  [ ] weight loss  [ ]night sweat  [ ]poor appetite/PO intake [ ]fatigue   Skin:  [ ] rash [ ] phlebitis	  Eyes: [ ] icterus [ ] pain  [ ] discharge	  ENMT: [ ] sore throat  [ ] thrush [ ] ulcers [ ] exudates [ ]anosmia  Respiratory: [ ] dyspnea [ ] hemoptysis [ ] cough [ ] sputum	  Cardiovascular:  [ ] chest pain [ ] palpitations [ ] edema	  Gastrointestinal:  [ ] nausea [ ] vomiting [ ] diarrhea [ ] constipation [ ] pain	  Genitourinary:  [ ] dysuria [ ] frequency [ ] hematuria [ ] discharge [ ] flank pain  [ ] incontinence  Musculoskeletal:  [ ] myalgias [ ] arthralgias [ ] arthritis  [ ] back pain  Neurological:  [ ] headache [ ] weakness [ ] seizures  [ ] confusion/altered mental status  prior hospital charts reviewed [V]  primary team notes reviewed [V]  other consultant notes reviewed [V]    PAST MEDICAL & SURGICAL HISTORY:  Raynaud disease      History of CREST syndrome      [place] (hard of hearing)      Hypertension      HLD (hyperlipidemia)      Seasonal allergies      Gangrene, not elsewhere classified      GIB (gastrointestinal bleeding)      PVD (peripheral vascular disease)      Chronic foot ulcer      Non-pressure chronic ulcer of other part of left foot with necrosis of bone      Anemia      PVD (peripheral vascular disease)      Aortic valve regurgitation      S/P BKA (below knee amputation), right      Status post amputation of toe of right foot      H/O colonoscopy      S/P endoscopy          SOCIAL HISTORY:  - Denied smoking/vaping/alcohol/recreational drug use    FAMILY HISTORY:  No pertinent family history        Allergies  No Known Allergies        ANTIMICROBIALS:  ceFAZolin   IVPB    ceFAZolin   IVPB 2000 every 8 hours  hydroxychloroquine 200 two times a day      ANTIMICROBIALS (past 90 days):  MEDICATIONS  (STANDING):    ceFAZolin   IVPB   100 mL/Hr IV Intermittent (11-24-24 @ 08:58)    hydroxychloroquine   200 milliGRAM(s) Oral (11-24-24 @ 05:51)   200 milliGRAM(s) Oral (11-23-24 @ 17:03)   200 milliGRAM(s) Oral (11-23-24 @ 05:54)   200 milliGRAM(s) Oral (11-22-24 @ 22:24)        OTHER MEDS:   MEDICATIONS  (STANDING):  acetaminophen     Tablet .. 650 every 6 hours  amLODIPine   Tablet 5 daily  atorvastatin 10 at bedtime  HYDROmorphone  Injectable 0.5 every 4 hours PRN  levothyroxine 112 daily  oxyCODONE    IR 5 every 4 hours PRN  pantoprazole    Tablet 40 before breakfast  polyethylene glycol 3350 17 daily  senna 2 at bedtime      VITALS:  Vital Signs Last 24 Hrs  T(F): 98.9 (11-24-24 @ 08:53), Max: 99.6 (11-24-24 @ 01:35)    Vital Signs Last 24 Hrs  HR: 87 (11-24-24 @ 08:53) (75 - 87)  BP: 118/65 (11-24-24 @ 08:53) (92/53 - 118/65)  RR: 18 (11-24-24 @ 08:53)  SpO2: 92% (11-24-24 @ 08:53) (91% - 93%)  Wt(kg): --    EXAM:    GA: NAD, AOx3  HEENT: oral cavity no lesion  CV: nl S1/S2, no RMG  Lungs: CTAB, No distress  Abd: BS+, soft, nontender, no rebounding pain  Ext: no edema  Neuro: No focal deficits  Skin: Intact  IV: no phlebitis  Labs:                        9.3    7.79  )-----------( 136      ( 24 Nov 2024 07:54 )             30.4     11-24    137  |  104  |  20  ----------------------------<  106[H]  4.1   |  20[L]  |  1.02    Ca    9.0      24 Nov 2024 07:55  Phos  2.9     11-24  Mg     1.9     11-24      WBC Trend:  WBC Count: 7.79 (11-24-24 @ 07:54)  WBC Count: 6.66 (11-23-24 @ 07:28)      Creatine Trend:  Creatinine: 1.02 (11-24)  Creatinine: 0.79 (11-23)    Liver Biochemical Testing Trend:    Trend LDH      Urinalysis Basic - ( 24 Nov 2024 07:55 )    Color: x / Appearance: x / SG: x / pH: x  Gluc: 106 mg/dL / Ketone: x  / Bili: x / Urobili: x   Blood: x / Protein: x / Nitrite: x   Leuk Esterase: x / RBC: x / WBC x   Sq Epi: x / Non Sq Epi: x / Bacteria: x      MICROBIOLOGY:        Culture - Tissue with Gram Stain (collected 22 Nov 2024 17:09)  Source: Tissue  Preliminary Report:    Rare Serratia marcescens    Rare Streptococcus agalactiae (Group B)    Group B streptococci are susceptible to ampicillin,    penicillin and cefazolin, but may be resistant to    erythromycin and clindamycin.    A1C with Estimated Average Glucose Result: 5.2 % (11-22-24 @ 17:31)      CSF:    RADIOLOGY:  < from: Xray Foot AP + Lateral + Oblique, Left (11.22.24 @ 17:17) >  IMPRESSION:  Status post TMA with sharp smooth osseous stump margins and well   formed/shaped amputation parabola.    Post surgical changes in the overlying soft tissues with Penrose type   surgical drain present across stump margins and with surgical staples   across the stump closure site.    Small lucencies over distal Achilles region on lateral view probably   related to concurrently performed tendo-Achilles lengthening procedure.   Otherwise, no proximally tracking gas collections beyond the amputation   margins and no focal areas of osteomyelitis.    Remainder of foot unchanged. Also correlate with intraoperative findings.    < end of copied text >  
Surgery Consult Note  Attending: Yana   Service: Vascular Surgery      HPI: 85 yo female with PMH of Raynaud's disease, CREST syndrome, PVD, s/p RLE stent placement in 03/2022 at [place], s/p R BKA in 06/2022 in [place] with Dr. Naik, hx of afib not on a/c 2/2 GIB i/s/o of antiplatelet medication, chronic left toe wound, underwent a Left TMA with achilles tendon lengthening on 11/22/24. Vascular surgery consulted today given c/f for viability of the foot incision .    PAST MEDICAL HISTORY:  PAST MEDICAL & SURGICAL HISTORY:  Raynaud disease      History of CREST syndrome      [place] (hard of hearing)      Hypertension      HLD (hyperlipidemia)      Seasonal allergies      Gangrene, not elsewhere classified      GIB (gastrointestinal bleeding)      PVD (peripheral vascular disease)      Chronic foot ulcer      Non-pressure chronic ulcer of other part of left foot with necrosis of bone      Anemia      PVD (peripheral vascular disease)      Aortic valve regurgitation      S/P BKA (below knee amputation), right      Status post amputation of toe of right foot      H/O colonoscopy      S/P endoscopy          ALLERGIES:  Allergies    No Known Allergies    Intolerances        SOCIAL HISTORY: Negative for tobacco, etoh, or drug use    FAMILY HISTORY:  FAMILY HISTORY:  No pertinent family history        PHYSICAL EXAM:  General: NAD, resting comfortably  Pulmonary: nonlabored respirations   Cardiovascular: pulse present  Extremities: RLE BKA, LLE TMA incision with periincisional ecchymosis, appropriately tender, LLE femoral, popliteal pulse, AT signal, PT signal      VITAL SIGNS:  Vital Signs Last 24 Hrs  T(C): 36.7 (24 Nov 2024 17:52), Max: 37.6 (24 Nov 2024 01:35)  T(F): 98 (24 Nov 2024 17:52), Max: 99.6 (24 Nov 2024 01:35)  HR: 79 (24 Nov 2024 17:52) (75 - 87)  BP: 114/65 (24 Nov 2024 17:52) (99/55 - 125/68)  BP(mean): --  RR: 18 (24 Nov 2024 17:52) (18 - 18)  SpO2: 94% (24 Nov 2024 17:52) (91% - 94%)    Parameters below as of 24 Nov 2024 17:52  Patient On (Oxygen Delivery Method): room air        I&O's Summary    23 Nov 2024 07:01  -  24 Nov 2024 07:00  --------------------------------------------------------  IN: 480 mL / OUT: 450 mL / NET: 30 mL    24 Nov 2024 07:01  -  24 Nov 2024 19:43  --------------------------------------------------------  IN: 480 mL / OUT: 0 mL / NET: 480 mL        LABS:                        9.3    7.79  )-----------( 136      ( 24 Nov 2024 07:54 )             30.4     11-24    137  |  104  |  20  ----------------------------<  106[H]  4.1   |  20[L]  |  1.02    Ca    9.0      24 Nov 2024 07:55  Phos  2.9     11-24  Mg     1.9     11-24        Urinalysis Basic - ( 24 Nov 2024 07:55 )    Color: x / Appearance: x / SG: x / pH: x  Gluc: 106 mg/dL / Ketone: x  / Bili: x / Urobili: x   Blood: x / Protein: x / Nitrite: x   Leuk Esterase: x / RBC: x / WBC x   Sq Epi: x / Non Sq Epi: x / Bacteria: x      CAPILLARY BLOOD GLUCOSE            CULTURES:      RADIOLOGY & ADDITIONAL STUDIES:

## 2024-11-24 NOTE — PROGRESS NOTE ADULT - SUBJECTIVE AND OBJECTIVE BOX
Patient is a 84y old  Female who presents with a chief complaint of chronic left osteomyelitis (23 Nov 2024 16:30)       INTERVAL HPI/OVERNIGHT EVENTS:  Patient seen and evaluated at bedside.  Pt is resting comfortable in NAD. Denies N/V/F/C.    Allergies    No Known Allergies    Intolerances        Vital Signs Last 24 Hrs  T(C): 37.2 (24 Nov 2024 08:53), Max: 37.6 (24 Nov 2024 01:35)  T(F): 98.9 (24 Nov 2024 08:53), Max: 99.6 (24 Nov 2024 01:35)  HR: 87 (24 Nov 2024 08:53) (75 - 87)  BP: 118/65 (24 Nov 2024 08:53) (92/53 - 118/65)  BP(mean): --  RR: 18 (24 Nov 2024 08:53) (18 - 18)  SpO2: 92% (24 Nov 2024 08:53) (91% - 93%)    Parameters below as of 24 Nov 2024 08:53  Patient On (Oxygen Delivery Method): room air        LABS:                        9.3    7.79  )-----------( 136      ( 24 Nov 2024 07:54 )             30.4     11-24    137  |  104  |  20  ----------------------------<  106[H]  4.1   |  20[L]  |  1.02    Ca    9.0      24 Nov 2024 07:55  Phos  2.9     11-24  Mg     1.9     11-24        Urinalysis Basic - ( 24 Nov 2024 07:55 )    Color: x / Appearance: x / SG: x / pH: x  Gluc: 106 mg/dL / Ketone: x  / Bili: x / Urobili: x   Blood: x / Protein: x / Nitrite: x   Leuk Esterase: x / RBC: x / WBC x   Sq Epi: x / Non Sq Epi: x / Bacteria: x      CAPILLARY BLOOD GLUCOSE          Lower Extremity Physical Exam:  Vascular: DP/PT 0/4 B/L, CFT <3sec x 10, Temp gradient warm to cool B/L  Neurology: Epicritic sensation diminished to level of digits, B/L  Musculoskeletal/Ortho: unremarkable   Skin: 11/22 s/p left foot transmetatarsal amputation with tendoachilles lengthening, closed: sutures and staples intact, moderate sanguinous drainage, penrose drain intact, flaps cold with ischemic changes.   RADIOLOGY & ADDITIONAL TESTS:   Patient is a 84y old  Female who presents with a chief complaint of chronic left osteomyelitis (23 Nov 2024 16:30)       INTERVAL HPI/OVERNIGHT EVENTS:  Patient seen and evaluated at bedside.  Pt is resting comfortable in NAD. Denies N/V/F/C.    Allergies    No Known Allergies    Intolerances        Vital Signs Last 24 Hrs  T(C): 37.2 (24 Nov 2024 08:53), Max: 37.6 (24 Nov 2024 01:35)  T(F): 98.9 (24 Nov 2024 08:53), Max: 99.6 (24 Nov 2024 01:35)  HR: 87 (24 Nov 2024 08:53) (75 - 87)  BP: 118/65 (24 Nov 2024 08:53) (92/53 - 118/65)  BP(mean): --  RR: 18 (24 Nov 2024 08:53) (18 - 18)  SpO2: 92% (24 Nov 2024 08:53) (91% - 93%)    Parameters below as of 24 Nov 2024 08:53  Patient On (Oxygen Delivery Method): room air        LABS:                        9.3    7.79  )-----------( 136      ( 24 Nov 2024 07:54 )             30.4     11-24    137  |  104  |  20  ----------------------------<  106[H]  4.1   |  20[L]  |  1.02    Ca    9.0      24 Nov 2024 07:55  Phos  2.9     11-24  Mg     1.9     11-24        Urinalysis Basic - ( 24 Nov 2024 07:55 )    Color: x / Appearance: x / SG: x / pH: x  Gluc: 106 mg/dL / Ketone: x  / Bili: x / Urobili: x   Blood: x / Protein: x / Nitrite: x   Leuk Esterase: x / RBC: x / WBC x   Sq Epi: x / Non Sq Epi: x / Bacteria: x      CAPILLARY BLOOD GLUCOSE          Lower Extremity Physical Exam:  Vascular: DP/PT 0/4 B/L, CFT <3sec x 10, Temp gradient warm to cool B/L  Neurology: Epicritic sensation diminished to level of digits, B/L  Musculoskeletal/Ortho: unremarkable   Skin: 11/22 s/p left foot transmetatarsal amputation with tendoachilles lengthening, closed: sutures and staples intact, moderate sanguinous drainage, flaps cold with ischemic changes.   RADIOLOGY & ADDITIONAL TESTS:

## 2024-11-25 LAB
ANION GAP SERPL CALC-SCNC: 12 MMOL/L — SIGNIFICANT CHANGE UP (ref 5–17)
BUN SERPL-MCNC: 14 MG/DL — SIGNIFICANT CHANGE UP (ref 7–23)
CALCIUM SERPL-MCNC: 8.9 MG/DL — SIGNIFICANT CHANGE UP (ref 8.4–10.5)
CHLORIDE SERPL-SCNC: 106 MMOL/L — SIGNIFICANT CHANGE UP (ref 96–108)
CO2 SERPL-SCNC: 20 MMOL/L — LOW (ref 22–31)
CREAT SERPL-MCNC: 0.85 MG/DL — SIGNIFICANT CHANGE UP (ref 0.5–1.3)
EGFR: 68 ML/MIN/1.73M2 — SIGNIFICANT CHANGE UP
GLUCOSE SERPL-MCNC: 84 MG/DL — SIGNIFICANT CHANGE UP (ref 70–99)
HCT VFR BLD CALC: 30.4 % — LOW (ref 34.5–45)
HGB BLD-MCNC: 9.3 G/DL — LOW (ref 11.5–15.5)
MAGNESIUM SERPL-MCNC: 1.9 MG/DL — SIGNIFICANT CHANGE UP (ref 1.6–2.6)
MCHC RBC-ENTMCNC: 29.2 PG — SIGNIFICANT CHANGE UP (ref 27–34)
MCHC RBC-ENTMCNC: 30.6 G/DL — LOW (ref 32–36)
MCV RBC AUTO: 95.3 FL — SIGNIFICANT CHANGE UP (ref 80–100)
NRBC # BLD: 0 /100 WBCS — SIGNIFICANT CHANGE UP (ref 0–0)
PHOSPHATE SERPL-MCNC: 2.7 MG/DL — SIGNIFICANT CHANGE UP (ref 2.5–4.5)
PLATELET # BLD AUTO: 139 K/UL — LOW (ref 150–400)
POTASSIUM SERPL-MCNC: 3.8 MMOL/L — SIGNIFICANT CHANGE UP (ref 3.5–5.3)
POTASSIUM SERPL-SCNC: 3.8 MMOL/L — SIGNIFICANT CHANGE UP (ref 3.5–5.3)
RBC # BLD: 3.19 M/UL — LOW (ref 3.8–5.2)
RBC # FLD: 15.8 % — HIGH (ref 10.3–14.5)
SODIUM SERPL-SCNC: 138 MMOL/L — SIGNIFICANT CHANGE UP (ref 135–145)
WBC # BLD: 8.66 K/UL — SIGNIFICANT CHANGE UP (ref 3.8–10.5)
WBC # FLD AUTO: 8.66 K/UL — SIGNIFICANT CHANGE UP (ref 3.8–10.5)

## 2024-11-25 PROCEDURE — 99232 SBSQ HOSP IP/OBS MODERATE 35: CPT

## 2024-11-25 PROCEDURE — 93923 UPR/LXTR ART STDY 3+ LVLS: CPT | Mod: 26

## 2024-11-25 RX ORDER — POLYETHYLENE GLYCOL 3350 17 G/17G
17 POWDER, FOR SOLUTION ORAL
Refills: 0 | Status: DISCONTINUED | OUTPATIENT
Start: 2024-11-25 | End: 2024-11-29

## 2024-11-25 RX ORDER — SENNOSIDES 8.6 MG
2 TABLET ORAL AT BEDTIME
Refills: 0 | Status: DISCONTINUED | OUTPATIENT
Start: 2024-11-25 | End: 2024-11-25

## 2024-11-25 RX ORDER — MULTIVIT WITH MINERALS/LUTEIN
500 TABLET ORAL DAILY
Refills: 0 | Status: DISCONTINUED | OUTPATIENT
Start: 2024-11-25 | End: 2024-11-29

## 2024-11-25 RX ADMIN — CEFEPIME 100 MILLIGRAM(S): 2 INJECTION, POWDER, FOR SOLUTION INTRAVENOUS at 06:23

## 2024-11-25 RX ADMIN — ACETAMINOPHEN 500MG 650 MILLIGRAM(S): 500 TABLET, COATED ORAL at 07:22

## 2024-11-25 RX ADMIN — Medication 10 MILLIGRAM(S): at 14:28

## 2024-11-25 RX ADMIN — OXYCODONE HYDROCHLORIDE 5 MILLIGRAM(S): 30 TABLET ORAL at 09:19

## 2024-11-25 RX ADMIN — CEFEPIME 100 MILLIGRAM(S): 2 INJECTION, POWDER, FOR SOLUTION INTRAVENOUS at 22:05

## 2024-11-25 RX ADMIN — HYDROXYCHLOROQUINE SULFATE 200 MILLIGRAM(S): 200 TABLET, FILM COATED ORAL at 17:12

## 2024-11-25 RX ADMIN — Medication 1 TABLET(S): at 11:36

## 2024-11-25 RX ADMIN — ACETAMINOPHEN 500MG 650 MILLIGRAM(S): 500 TABLET, COATED ORAL at 01:47

## 2024-11-25 RX ADMIN — Medication 112 MICROGRAM(S): at 06:25

## 2024-11-25 RX ADMIN — Medication 1000 MICROGRAM(S): at 11:37

## 2024-11-25 RX ADMIN — OXYCODONE HYDROCHLORIDE 5 MILLIGRAM(S): 30 TABLET ORAL at 13:46

## 2024-11-25 RX ADMIN — ACETAMINOPHEN 500MG 650 MILLIGRAM(S): 500 TABLET, COATED ORAL at 11:37

## 2024-11-25 RX ADMIN — ACETAMINOPHEN 500MG 650 MILLIGRAM(S): 500 TABLET, COATED ORAL at 06:24

## 2024-11-25 RX ADMIN — ACETAMINOPHEN 500MG 650 MILLIGRAM(S): 500 TABLET, COATED ORAL at 12:37

## 2024-11-25 RX ADMIN — Medication 2 TABLET(S): at 22:07

## 2024-11-25 RX ADMIN — POLYETHYLENE GLYCOL 3350 17 GRAM(S): 17 POWDER, FOR SOLUTION ORAL at 11:37

## 2024-11-25 RX ADMIN — Medication 500 MILLIGRAM(S): at 14:28

## 2024-11-25 RX ADMIN — Medication 1 MILLIGRAM(S): at 11:37

## 2024-11-25 RX ADMIN — Medication 325 MILLIGRAM(S): at 11:37

## 2024-11-25 RX ADMIN — Medication 10 MILLIGRAM(S): at 22:07

## 2024-11-25 RX ADMIN — ACETAMINOPHEN 500MG 650 MILLIGRAM(S): 500 TABLET, COATED ORAL at 23:44

## 2024-11-25 RX ADMIN — PANTOPRAZOLE SODIUM 40 MILLIGRAM(S): 40 TABLET, DELAYED RELEASE ORAL at 06:24

## 2024-11-25 RX ADMIN — ACETAMINOPHEN 500MG 650 MILLIGRAM(S): 500 TABLET, COATED ORAL at 17:12

## 2024-11-25 RX ADMIN — OXYCODONE HYDROCHLORIDE 5 MILLIGRAM(S): 30 TABLET ORAL at 08:19

## 2024-11-25 RX ADMIN — POLYETHYLENE GLYCOL 3350 17 GRAM(S): 17 POWDER, FOR SOLUTION ORAL at 14:28

## 2024-11-25 RX ADMIN — OXYCODONE HYDROCHLORIDE 5 MILLIGRAM(S): 30 TABLET ORAL at 12:46

## 2024-11-25 RX ADMIN — CEFEPIME 100 MILLIGRAM(S): 2 INJECTION, POWDER, FOR SOLUTION INTRAVENOUS at 14:28

## 2024-11-25 RX ADMIN — ACETAMINOPHEN 500MG 650 MILLIGRAM(S): 500 TABLET, COATED ORAL at 18:12

## 2024-11-25 RX ADMIN — HYDROXYCHLOROQUINE SULFATE 200 MILLIGRAM(S): 200 TABLET, FILM COATED ORAL at 06:24

## 2024-11-25 RX ADMIN — ACETAMINOPHEN 500MG 650 MILLIGRAM(S): 500 TABLET, COATED ORAL at 00:47

## 2024-11-25 RX ADMIN — OXYCODONE HYDROCHLORIDE 5 MILLIGRAM(S): 30 TABLET ORAL at 19:18

## 2024-11-25 NOTE — PROGRESS NOTE ADULT - ASSESSMENT
84-year-old female with a past medical history of Raynaud's disease, crest syndrome, peripheral vascular disease status post right BKA, atrial fibrillation not on anticoagulation due to GI bleed, chronic left toe wound, amputation of the left second digit who was admitted to the hospital due to a previously scheduled left foot metatarsal amputation with Achilles lengthening.    Patient with no systemic or localizing symptoms on admission.  Underwent left foot metatarsal amputation and Achilles lengthening on 11/22/2024.  Low concern for residual bone infection, high concern for viability.    Patient admitted for further management postoperatively.  ID now consulted due to positive clean bone margin culture growing Serratia, strep agalactiae, Pseudomonas.    Since admission, patient has been afebrile.  Otherwise hemodynamically stable on room air.  Latest labs show no leukocytosis, anemia 9.3/30.4, BMP with renal function within GFR 54.    #Left foot wound status post transmetatarsal amputation with Achilles lengthening on 11/22/2024  #History of crest syndrome/Raynaud's on hydroxychloroquine  #PVD    Recommendations  Patient with postoperative clean margin cultures positive  Polymicrobial growth in cultures  Serratia and strep and clean margins–cultures pending  Previous Pseudomonas growth as well  Continue cefepime  Discussed with patient and son rationale for possible further surgery for source control versus IV antibiotics for 6 weeks  Concern that given circulatory compromise antibiotics effectiveness would be markedly reduced  Surgical planning per podiatry  If no surgery, anticipating need for PICC line  Follow fever curve and WBC count    Bryant Sandoval MD  Division of Infectious Diseases

## 2024-11-25 NOTE — DIETITIAN INITIAL EVALUATION ADULT - ETIOLOGY
related to inability to meet sufficient protein-energy needs in setting of institutional food dislike  related to increased physiological demand for nutrients

## 2024-11-25 NOTE — PROGRESS NOTE ADULT - ASSESSMENT
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 Admitted to medicine for post-op management =

## 2024-11-25 NOTE — DIETITIAN INITIAL EVALUATION ADULT - NS FNS DIET ORDER
Diet, Regular:   DASH/TLC {Sodium & Cholesterol Restricted} (DASH) (11-23-24 @ 06:11) [Active]

## 2024-11-25 NOTE — DIETITIAN INITIAL EVALUATION ADULT - ORAL INTAKE PTA/DIET HISTORY
Nutrition assessment completed at bedside. Pt reported good appetite/PO intake prior to admission, no changes. Endorsed UBW of ~134 pounds prior to admission, endorsed no recent significant changes. Confirmed no known food allergies.

## 2024-11-25 NOTE — DIETITIAN INITIAL EVALUATION ADULT - NSFNSPHYEXAMSKINFT_GEN_A_CORE
Pressure Injury #1 - sacrum, b/l buttocks, suspected deep tissue injury   (per nursing flow sheets)    Per Podiatry (11/24):  "11/22 s/p left foot transmetatarsal amputation with tendoachilles lengthening, closed: sutures and staples intact, moderate sanguinous drainage, flaps cold with ischemic changes."

## 2024-11-25 NOTE — PROGRESS NOTE ADULT - ASSESSMENT
83 yo female with PMH of Raynaud's disease, CREST syndrome, PVD, s/p RLE stent placement in 03/2022 at [place], s/p R BKA in 06/2022, s/p  in [facility], s/p Left TMA with achilles tendon lengthening on 11/22/24. Vascular surgery consulted for c/f viability of LLE foot incision.    Recs:  - Please order BARRY/PVR of LLE  - Podiatry recs and wound care appreciated  - Remainder of care per primary  - Vascular will follow    Vascular Surgery   [phone]

## 2024-11-25 NOTE — DIETITIAN INITIAL EVALUATION ADULT - REASON INDICATOR FOR ASSESSMENT
RD consult warranted for "pressure injury"  Source: Patient, Electronic Medical Record  Chart reviewed, events noted.

## 2024-11-25 NOTE — DIETITIAN INITIAL EVALUATION ADULT - PERSON TAUGHT/METHOD
RD provided constipation nutrition therapy, encouraged adequate hydration and consumption of fiber-rich foods.     Provided education on heart healthy nutrition therapy. Recommended limited salt intake. Reviewed foods high in salt and amount of salt recommended per day. Discussed nutrition label reading. Educated on the importance of consuming a variety of fruits and vegetables with each meal. Encouraged the consumption of whole grains and lean proteins at each meal. Educated on the importance of limiting added sugars, saturated fat, and refined carbohydrates. Encouraged adequate hydration. Pt receptive.     Encouraged HBV protein intake for wound healing. Reviewed sources of HBV protein foods./verbal instruction/written material/patient instructed

## 2024-11-25 NOTE — DIETITIAN INITIAL EVALUATION ADULT - REASON
Nutrition focused physical exam not warranted at this time as pt stated she is eating well and has not experienced any recent unintentional weight changes. Continue to monitor weights and po intake in-house.

## 2024-11-25 NOTE — DIETITIAN INITIAL EVALUATION ADULT - SIGNS/SYMPTOMS
as evidenced by pt meeting <75% EER x 4 days in-house.  as evidenced by pt with suspected deep tissue pressure injury.

## 2024-11-25 NOTE — DIETITIAN INITIAL EVALUATION ADULT - PERTINENT MEDS FT
MEDICATIONS  (STANDING):  acetaminophen     Tablet .. 650 milliGRAM(s) Oral every 6 hours  amLODIPine   Tablet 5 milliGRAM(s) Oral daily  atorvastatin 10 milliGRAM(s) Oral at bedtime  cefepime   IVPB 1000 milliGRAM(s) IV Intermittent every 8 hours  cyanocobalamin 1000 MICROGram(s) Oral daily  ferrous    sulfate 325 milliGRAM(s) Oral daily  folic acid 1 milliGRAM(s) Oral daily  hydroxychloroquine 200 milliGRAM(s) Oral two times a day  levothyroxine 112 MICROGram(s) Oral daily  multivitamin 1 Tablet(s) Oral daily  pantoprazole    Tablet 40 milliGRAM(s) Oral before breakfast  polyethylene glycol 3350 17 Gram(s) Oral daily  senna 2 Tablet(s) Oral at bedtime    MEDICATIONS  (PRN):  HYDROmorphone  Injectable 0.5 milliGRAM(s) IV Push every 4 hours PRN Severe Pain (7 - 10)  oxyCODONE    IR 5 milliGRAM(s) Oral every 4 hours PRN Severe Pain (7 - 10)

## 2024-11-25 NOTE — PROGRESS NOTE ADULT - SUBJECTIVE AND OBJECTIVE BOX
Follow Up: foot wound     Interval History/ROS:  Overnight: no acute events.  Seen an examined at bedside. no new complaints.     Allergies  No Known Allergies        ANTIMICROBIALS:  cefepime   IVPB 1000 every 8 hours  hydroxychloroquine 200 two times a day      OTHER MEDS:  MEDICATIONS  (STANDING):  acetaminophen     Tablet .. 650 every 6 hours  amLODIPine   Tablet 5 daily  atorvastatin 10 at bedtime  HYDROmorphone  Injectable 0.5 every 4 hours PRN  levothyroxine 112 daily  oxyCODONE    IR 5 every 4 hours PRN  pantoprazole    Tablet 40 before breakfast  polyethylene glycol 3350 17 two times a day  senna 2 at bedtime      Vital Signs Last 24 Hrs  T(C): 37.1 (25 Nov 2024 17:05), Max: 37.8 (25 Nov 2024 13:27)  T(F): 98.7 (25 Nov 2024 17:05), Max: 100 (25 Nov 2024 13:27)  HR: 82 (25 Nov 2024 17:05) (73 - 89)  BP: 96/59 (25 Nov 2024 17:05) (96/59 - 137/70)  BP(mean): --  RR: 18 (25 Nov 2024 17:05) (18 - 18)  SpO2: 94% (25 Nov 2024 17:05) (93% - 95%)    Parameters below as of 25 Nov 2024 17:05  Patient On (Oxygen Delivery Method): room air        PHYSICAL EXAM:  GA: NAD, AOx3  HEENT: oral cavity no lesion  CV: nl S1/S2, no RMG  Lungs: CTAB, No distress  Abd: BS+, soft, nontender, no rebounding pain  Ext: no edema  Neuro: No focal deficits  Skin: Intact  IV: no phlebitis                            9.3    8.66  )-----------( 139      ( 25 Nov 2024 06:38 )             30.4       11-25    138  |  106  |  14  ----------------------------<  84  3.8   |  20[L]  |  0.85    Ca    8.9      25 Nov 2024 06:41  Phos  2.7     11-25  Mg     1.9     11-25        Urinalysis Basic - ( 25 Nov 2024 06:41 )    Color: x / Appearance: x / SG: x / pH: x  Gluc: 84 mg/dL / Ketone: x  / Bili: x / Urobili: x   Blood: x / Protein: x / Nitrite: x   Leuk Esterase: x / RBC: x / WBC x   Sq Epi: x / Non Sq Epi: x / Bacteria: x        MICROBIOLOGY:  v    Culture - Tissue with Gram Stain (collected 22 Nov 2024 17:09)  Source: Tissue  Gram Stain (23 Nov 2024 23:18):    No polymorphonuclear cells seen per low power field    No organisms seen per oil power field  Preliminary Report (23 Nov 2024 23:18):    Rare Serratia marcescens    Rare Streptococcus agalactiae (Group B)    Group B streptococci are susceptible to ampicillin,    penicillin and cefazolin, but may be resistant to    erythromycin and clindamycin.  Organism: Serratia marcescens (24 Nov 2024 17:07)  Organism: Serratia marcescens (24 Nov 2024 17:07)      Method Type: LENORE      -  Amoxicillin/Clavulanic Acid: R >16/8      -  Ampicillin: R >16 These ampicillin results predict results for amoxicillin      -  Ampicillin/Sulbactam: R >16/8      -  Aztreonam: S <=4      -  Cefazolin: R >16      -  Cefepime: S <=2      -  Cefoxitin: R <=8      -  Ceftriaxone: S <=1      -  Ciprofloxacin: R >2      -  Ertapenem: S <=0.5      -  Gentamicin: S <=2      -  Levofloxacin: R 2      -  Meropenem: S <=1      -  Piperacillin/Tazobactam: S <=8      -  Tobramycin: I 4      -  Trimethoprim/Sulfamethoxazole: S <=0.5/9.5    Culture - Wound Aerobic/Anaerobic (collected 22 Nov 2024 17:03)  Source: .Surgical Swab  Preliminary Report (23 Nov 2024 23:20):    Moderate Serratia marcescens    Few Pseudomonas aeruginosa    Rare Streptococcus agalactiae (Group B)    Group B streptococci are susceptible to ampicillin,    penicillin and cefazolin, but may be resistant to    erythromycin and clindamycin.  Organism: Serratia marcescens  Pseudomonas aeruginosa (24 Nov 2024 16:57)  Organism: Pseudomonas aeruginosa (24 Nov 2024 16:57)      Method Type: LENORE      -  Aztreonam: S <=4      -  Cefepime: S <=2      -  Ceftazidime: S <=1      -  Ciprofloxacin: S <=0.25      -  Imipenem: S 2      -  Levofloxacin: S <=0.5      -  Meropenem: S <=1      -  Piperacillin/Tazobactam: S <=8  Organism: Serratia marcescens (24 Nov 2024 16:57)      Method Type: LENORE      -  Amoxicillin/Clavulanic Acid: R >16/8      -  Ampicillin: R >16 These ampicillin results predict results for amoxicillin      -  Ampicillin/Sulbactam: R >16/8      -  Aztreonam: S <=4      -  Cefazolin: R >16      -  Cefepime: S <=2      -  Cefoxitin: R >16      -  Ceftriaxone: S <=1      -  Ciprofloxacin: R >2      -  Ertapenem: S <=0.5      -  Gentamicin: S <=2      -  Levofloxacin: R >4      -  Meropenem: S <=1      -  Piperacillin/Tazobactam: S <=8      -  Tobramycin: S <=2      -  Trimethoprim/Sulfamethoxazole: S <=0.5/9.5                    RADIOLOGY:  Imaging reviewedd

## 2024-11-25 NOTE — PROGRESS NOTE ADULT - SUBJECTIVE AND OBJECTIVE BOX
Patient is a 84y old  Female who presents with a chief complaint of Other acute osteomyelitis of left ankle or foot     (25 Nov 2024 11:59)      SUBJECTIVE / OVERNIGHT EVENTS    feels well. states passing gas but no BM in 4 days. denies pain, fevers, chills.    ROS:  14 point ROS negative in detail except stated as above    MEDICATIONS  (STANDING):  acetaminophen     Tablet .. 650 milliGRAM(s) Oral every 6 hours  amLODIPine   Tablet 5 milliGRAM(s) Oral daily  ascorbic acid 500 milliGRAM(s) Oral daily  atorvastatin 10 milliGRAM(s) Oral at bedtime  bisacodyl Suppository 10 milliGRAM(s) Rectal once  cefepime   IVPB 1000 milliGRAM(s) IV Intermittent every 8 hours  cyanocobalamin 1000 MICROGram(s) Oral daily  ferrous    sulfate 325 milliGRAM(s) Oral daily  folic acid 1 milliGRAM(s) Oral daily  hydroxychloroquine 200 milliGRAM(s) Oral two times a day  levothyroxine 112 MICROGram(s) Oral daily  multivitamin 1 Tablet(s) Oral daily  pantoprazole    Tablet 40 milliGRAM(s) Oral before breakfast  polyethylene glycol 3350 17 Gram(s) Oral two times a day  senna 2 Tablet(s) Oral at bedtime    MEDICATIONS  (PRN):  HYDROmorphone  Injectable 0.5 milliGRAM(s) IV Push every 4 hours PRN Severe Pain (7 - 10)  oxyCODONE    IR 5 milliGRAM(s) Oral every 4 hours PRN Severe Pain (7 - 10)      CAPILLARY BLOOD GLUCOSE        I&O's Summary    24 Nov 2024 07:01  -  25 Nov 2024 07:00  --------------------------------------------------------  IN: 990 mL / OUT: 0 mL / NET: 990 mL    25 Nov 2024 07:01  -  25 Nov 2024 13:41  --------------------------------------------------------  IN: 480 mL / OUT: 0 mL / NET: 480 mL        PHYSICAL EXAM:  Vital Signs Last 24 Hrs  T(C): 37.8 (25 Nov 2024 13:27), Max: 37.8 (25 Nov 2024 13:27)  T(F): 100 (25 Nov 2024 13:27), Max: 100 (25 Nov 2024 13:27)  HR: 75 (25 Nov 2024 13:27) (73 - 89)  BP: 114/72 (25 Nov 2024 13:27) (100/61 - 137/70)  BP(mean): --  RR: 18 (25 Nov 2024 13:27) (18 - 18)  SpO2: 95% (25 Nov 2024 13:27) (93% - 95%)    Parameters below as of 25 Nov 2024 13:27  Patient On (Oxygen Delivery Method): room air    CONSTITUTIONAL: NAD  RESPIRATORY: Normal respiratory effort; lungs are clear to auscultation bilaterally  CARDIOVASCULAR: Regular rate and rhythm, normal S1 and S2, no murmur/rub/gallop; No lower extremity edema  ABDOMEN: Nontender to palpation, normoactive bowel sounds, no rebound/guarding  MUSCLOSKELETAL: R BKA, L foot in dressing  PSYCH: A+O to person, place, and time; affect appropriate      LABS:                        9.3    8.66  )-----------( 139      ( 25 Nov 2024 06:38 )             30.4     11-25    138  |  106  |  14  ----------------------------<  84  3.8   |  20[L]  |  0.85    Ca    8.9      25 Nov 2024 06:41  Phos  2.7     11-25  Mg     1.9     11-25            Urinalysis Basic - ( 25 Nov 2024 06:41 )    Color: x / Appearance: x / SG: x / pH: x  Gluc: 84 mg/dL / Ketone: x  / Bili: x / Urobili: x   Blood: x / Protein: x / Nitrite: x   Leuk Esterase: x / RBC: x / WBC x   Sq Epi: x / Non Sq Epi: x / Bacteria: x        RADIOLOGY & ADDITIONAL TESTS:    Imaging Personally Reviewed:    < from: [facility] Physiol Extremity Lower 3+ Level, BI (11.25.24 @ 10:37) >  IMPRESSION:    There is a right lower extremity and left transmetatarsal amputation.    The left BARRY of 0.83 is mildly abnormal in the range for claudication.  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    < end of copied text >      Consultant(s) Notes Reviewed:      Care Discussed with Consultants/Other Providers:  Ashvin Watters PGY

## 2024-11-25 NOTE — DIETITIAN INITIAL EVALUATION ADULT - NSFNSNUTRHOMESUPPLEMENTFT_GEN_A_CORE
Pt stated she takes supplements from "the pharmacy," unable to recall names at this time.  Per H&P, pt noted to be taking Folic Acid, Cyanocobalamin, Multivitamin and Feosol.   Pt declined use of oral nutrition supplements at home.

## 2024-11-25 NOTE — DIETITIAN INITIAL EVALUATION ADULT - ENERGY INTAKE
Fair (50-75%) Currently in-house, pt stated her appetite is "so-so." Stated she dislikes some foods provided in-house, consuming 50-75% of meals. RD obtained food preferences to optimize PO intake, will honor as able. Pt amenable to trialing ProSource Gelatin 2x/day in-house to optimize protein-energy intake and assist with wound healing, declined Ensure protein shakes at this time.

## 2024-11-25 NOTE — DIETITIAN INITIAL EVALUATION ADULT - PERTINENT LABORATORY DATA
11-25    138  |  106  |  14  ----------------------------<  84  3.8   |  20[L]  |  0.85    Ca    8.9      25 Nov 2024 06:41  Phos  2.7     11-25  Mg     1.9     11-25    A1C with Estimated Average Glucose Result: 5.2 % (11-22-24 @ 17:31)

## 2024-11-25 NOTE — DIETITIAN INITIAL EVALUATION ADULT - NSFNSADHERENCEPTAFT_GEN_A_CORE
Pt stated she did not follow any type of therapeutic diet prior to admission. Endorsed good appetite/PO intake at baseline, no recent changes prior to admission.

## 2024-11-25 NOTE — DIETITIAN INITIAL EVALUATION ADULT - NSFNSNUTRCHEWSWALLOWFT_GEN_A_CORE
Pt reported no chewing difficulties. Endorsed swallowing difficulties "with certain foods." RD offered to notify the provider to request a formal Speech Language Pathologist evaluation in setting of pt reporting swallowing difficulty, however pt declined at this time. Does not want an Speech Language Pathologist evaluation. Pt made aware services remain available if amenable in the future. Defer diet texture/consistency to Medical Team.

## 2024-11-25 NOTE — DIETITIAN INITIAL EVALUATION ADULT - OTHER CALCULATIONS
Estimated protein-energy needs calculated using current dosing weight of 134.9 pounds (11/22) with consideration for suspected deep tissue pressure injury.   Defer fluid calculations to the medical team.

## 2024-11-25 NOTE — DIETITIAN INITIAL EVALUATION ADULT - PROBLEM SELECTOR PLAN 3
"Primo Wilson is a 41 year old male who is being evaluated via a billable video visit.    Follow-up gerd, asthma, ALLERGIC RHINITIS and hair loss.   Zyrtec prescription - working well - daily. No flonase. ALLERGIC RHINITIS - stable. Epi-pen. Taking finasteride - working well. No urine changes. Dad with lymphoma at 55yo. No family history prostate/colon cancer. No family history mi/cva.  GERD - stable. Normal egd in past. No milk. Will start vitaminD. Breathing doing good. Just on albuterol prn - exercise. No chest pain.   The patient has been notified of following:     \"This video visit will be conducted via a call between you and your physician/provider. We have found that certain health care needs can be provided without the need for an in-person physical exam.  This service lets us provide the care you need with a video conversation.  If a prescription is necessary we can send it directly to your pharmacy.  If lab work is needed we can place an order for that and you can then stop by our lab to have the test done at a later time.    Video visits are billed at different rates depending on your insurance coverage.  Please reach out to your insurance provider with any questions.    If during the course of the call the physician/provider feels a video visit is not appropriate, you will not be charged for this service.\"    Patient has given verbal consent for Video visit? Yes    How would you like to obtain your AVS? [facility]    Patient would like the video invitation sent by: Text to cell phone: [phone]    Will anyone else be joining your video visit? No    Subjective     Primo Wilson is a 41 year old male who presents today via video visit for the following health issues:    HPI  Asthma Follow-Up    Was ACT completed today?  No      Do you have a cough?  No    Are you experiencing any wheezing in your chest?  No    Do you have any shortness of breath?  No     How often are you using a short-acting " "(rescue) inhaler or nebulizer, such as Albuterol?  Daily    How many days per week do you miss taking your asthma controller medication?  0    Please describe any recent triggers for your asthma: animal dander and allergies excerise    Have you had any Emergency Room Visits, Urgent Care Visits, or Hospital Admissions since your last office visit?  No        Video Start Time: 8:38 AM    PROBLEMS TO ADD ON...    Patient Active Problem List   Diagnosis     CARDIOVASCULAR SCREENING; LDL GOAL LESS THAN 160     Male pattern baldness     Environmental allergies     Hyperlipidemia LDL goal <130     Blood glucose elevated     H. pylori infection     Generalized anxiety disorder     Hernia, umbilical     Disorder of sacrum     Moderate persistent asthma without complication     Gastroesophageal reflux disease without esophagitis     Obesity (BMI 35.0-39.9) with comorbidity (H)     Past Surgical History:   Procedure Laterality Date     HERNIORRHAPHY UMBILICAL  6/27/2013    Procedure: HERNIORRHAPHY UMBILICAL;  Open umbilical hernia repair;  Surgeon: Eliud Johnston MD;  Location: [facility] OR     TONSILLECTOMY         Social History     Tobacco Use     Smoking status: Never Smoker     Smokeless tobacco: Never Used   Substance Use Topics     Alcohol use: Yes     Comment: social     Family History   Problem Relation Age of Onset     Cancer Mother         Brain tumor     Heart Failure Father         LYmphoma, CHF, arrythmia , aortic valve, CABG           Reviewed and updated as needed this visit by Provider         Review of Systems   All other ROS negative      Objective    There were no vitals taken for this visit.  Estimated body mass index is 37.56 kg/m  as calculated from the following:    Height as of 6/4/19: 1.664 m (5' 5.5\").    Weight as of 6/4/19: 104 kg (229 lb 3.2 oz).  Physical Exam     GENERAL: Healthy, alert and no distress  EYES: Eyes grossly normal to inspection.  No discharge or erythema, or obvious " scleral/conjunctival abnormalities.  RESP: No audible wheeze, cough, or visible cyanosis.  No visible retractions or increased work of breathing.    SKIN: Visible skin clear. No significant rash, abnormal pigmentation or lesions.  NEURO: Cranial nerves grossly intact.  Mentation and speech appropriate for age.  PSYCH: Mentation appears normal, affect normal/bright, judgement and insight intact, normal speech and appearance well-groomed.      Diagnostic Test Results:  Labs reviewed in Epic      ASSESSMENT / PLAN:  (J45.40) Moderate persistent asthma without complication  Comment: stable off advair  Plan: VENTOLIN  (90 Base) MCG/ACT inhaler        Reveiwed risks and side effects of medication  Restart advair if needed. Continue exercise    (K21.9) Gastroesophageal reflux disease, esophagitis presence not specified  Comment: stable  Plan: omeprazole (PRILOSEC) 20 MG DR capsule        Try every other day. Diet/ weight loss. Repeat egd if worse. Take vitaminD/MVI    (L64.9) Male pattern baldness  Comment: stable  Plan: finasteride (PROSCAR) 5 MG tablet        Reveiwed risks and side effects of medication      (H10.45) Chronic allergic conjunctivitis  Comment: stable  Plan: cetirizine (ZYRTEC) 10 MG tablet                  Video-Visit Details    Type of service:  Video Visit    Video End Time:8:46 AM    Originating Location (pt. Location): Home    Distant Location (provider location):  [facility]     Platform used for Video Visit: [facility]    No follow-ups on file.       Renan Beck MD               takes simvastatin 20mg qHS at home   - c/w inpatient formulary interchange with atorvastatin 10mg qHS  - resume home simvastatin on d/c

## 2024-11-25 NOTE — PROGRESS NOTE ADULT - SUBJECTIVE AND OBJECTIVE BOX
VASCULAR SURGERY DAILY PROGRESS NOTE    SUBJECTIVE: Patient seen and examined on AM rounds. Resting comfortably in bed, NAD. NAEO. No new complaints, says her foot pain is well controlled.      OBJECTIVE:  Vital Signs Last 24 Hrs  T(C): 36.9 (25 Nov 2024 08:50), Max: 37.1 (24 Nov 2024 13:17)  T(F): 98.4 (25 Nov 2024 08:50), Max: 98.7 (24 Nov 2024 13:17)  HR: 89 (25 Nov 2024 08:50) (73 - 89)  BP: 101/53 (25 Nov 2024 08:50) (100/61 - 137/70)  BP(mean): --  RR: 18 (25 Nov 2024 08:50) (18 - 18)  SpO2: 94% (25 Nov 2024 08:50) (93% - 95%)    Parameters below as of 25 Nov 2024 08:50  Patient On (Oxygen Delivery Method): room air        I&O's Summary    24 Nov 2024 07:01  -  25 Nov 2024 07:00  --------------------------------------------------------  IN: 990 mL / OUT: 0 mL / NET: 990 mL        Physical Exam:  General Appearance: Appears well, NAD, A& O x 3. [place]  Chest: Nonlabored breathing on room air  CV: Hemodynamically stable  Extremities: RLE s/p BKA. L foot s/p TMA, dressing C/D/I. Palpable femoral pulses bilaterally, nonpalpable L pedal pulses. Motor and sensation grossly intact.     LABS:                        9.3    8.66  )-----------( 139      ( 25 Nov 2024 06:38 )             30.4     11-25    138  |  106  |  14  ----------------------------<  84  3.8   |  20[L]  |  0.85    Ca    8.9      25 Nov 2024 06:41  Phos  2.7     11-25  Mg     1.9     11-25        Urinalysis Basic - ( 25 Nov 2024 06:41 )    Color: x / Appearance: x / SG: x / pH: x  Gluc: 84 mg/dL / Ketone: x  / Bili: x / Urobili: x   Blood: x / Protein: x / Nitrite: x   Leuk Esterase: x / RBC: x / WBC x   Sq Epi: x / Non Sq Epi: x / Bacteria: x

## 2024-11-25 NOTE — DIETITIAN INITIAL EVALUATION ADULT - NSICDXPASTMEDICALHX_GEN_ALL_CORE_FT
PAST MEDICAL HISTORY:  Anemia     Aortic valve regurgitation     Chronic foot ulcer     Gangrene, not elsewhere classified     GIB (gastrointestinal bleeding)     History of CREST syndrome     HLD (hyperlipidemia)     [place] (hard of hearing)     Hypertension     Non-pressure chronic ulcer of other part of left foot with necrosis of bone     PVD (peripheral vascular disease)     PVD (peripheral vascular disease)     Raynaud disease     Seasonal allergies

## 2024-11-25 NOTE — DIETITIAN INITIAL EVALUATION ADULT - OTHER INFO
Weights:  - UBW (per patient): ~134 pounds (reported weights stable prior to admission)  - Dosing Weight (per chart): 134.9 pounds (11/22) ( used)  - Daily Weights (per flow sheets): No daily weights noted to assess at this time.   - Bed Scale Weight (taken by RD): 128.0 pounds (11/25)(bed)  - Per [facility] HIE: 134.15 pounds (11/22/24), 160 pounds (3/13/24)(question accuracy?), 145.8 pounds (3/7/24), 145.1 pounds (2/23/24), 165 pounds (12/18/23)  Pt reported stable UBW of ~134 pounds prior to admission with good appetite/PO intake. Current dosing weight consistent with reported UBW of 134 pounds. Question accuracy of current RD bed scale weight? Discrepancies noted. Using weight from 12/18/23 and current dosing weight, pt noted with possible 18% wt loss x 11 months. Pt reported no weight changes? RD to continue to monitor weights and trends as able.     Nutritional Concerns:  - Per chart, pt with chronic osteomyelitis of left foot. S/p "left foot metatarsal amputation with Achilles lengthening on 11/22/24." Ordered for antibiotics in-house (per orders).  - Pt with HTN, HLD (per chart).   - Pt with hypothyroidism (per chart), ordered for Synthroid (per orders).  - Ordered for Cyanocobalamin, Ferrous Sulfate, Folic Acid, and Multivitamin (per orders).  - Glucose low 11/23, now within normal limits (per chart).   -

## 2024-11-25 NOTE — DIETITIAN INITIAL EVALUATION ADULT - ADD RECOMMEND
1) Recommend continue current diet as tolerated: DASH. Defer diet texture/consistency to Medical Team. Pt declined Speech Language Pathologist evaluation at this time.   2) Recommend continue Multivitamin, Cyanocobalamin, Ferrous Sulfate, and Folic Acid daily, add Vitamin C daily pending no medical contraindications for wound healing.   3) Recommend adding ProSource Gelatin Plus 2x/day to optimize protein-energy intake and assist with wound healing. RD to add in food preferences to optimize protein-energy intake.   4) Monitor electrolytes, replete as needed.   5) Monitor bowel movements and bowel movement regularity in setting of constipation. RD to add prunes 2x/day. Adjust bowel regimen as needed.   6) Monitor nutritional intake, tolerance to diet prescription, bowel movements, weights, labs, and skin integrity.

## 2024-11-26 PROCEDURE — 99232 SBSQ HOSP IP/OBS MODERATE 35: CPT

## 2024-11-26 RX ADMIN — POLYETHYLENE GLYCOL 3350 17 GRAM(S): 17 POWDER, FOR SOLUTION ORAL at 05:23

## 2024-11-26 RX ADMIN — OXYCODONE HYDROCHLORIDE 5 MILLIGRAM(S): 30 TABLET ORAL at 18:05

## 2024-11-26 RX ADMIN — HYDROXYCHLOROQUINE SULFATE 200 MILLIGRAM(S): 200 TABLET, FILM COATED ORAL at 05:25

## 2024-11-26 RX ADMIN — Medication 1 MILLIGRAM(S): at 11:32

## 2024-11-26 RX ADMIN — CEFEPIME 100 MILLIGRAM(S): 2 INJECTION, POWDER, FOR SOLUTION INTRAVENOUS at 21:49

## 2024-11-26 RX ADMIN — ACETAMINOPHEN 500MG 650 MILLIGRAM(S): 500 TABLET, COATED ORAL at 11:32

## 2024-11-26 RX ADMIN — Medication 1000 MICROGRAM(S): at 11:32

## 2024-11-26 RX ADMIN — OXYCODONE HYDROCHLORIDE 5 MILLIGRAM(S): 30 TABLET ORAL at 01:23

## 2024-11-26 RX ADMIN — PANTOPRAZOLE SODIUM 40 MILLIGRAM(S): 40 TABLET, DELAYED RELEASE ORAL at 05:24

## 2024-11-26 RX ADMIN — HYDROXYCHLOROQUINE SULFATE 200 MILLIGRAM(S): 200 TABLET, FILM COATED ORAL at 18:01

## 2024-11-26 RX ADMIN — CEFEPIME 100 MILLIGRAM(S): 2 INJECTION, POWDER, FOR SOLUTION INTRAVENOUS at 14:49

## 2024-11-26 RX ADMIN — ACETAMINOPHEN 500MG 650 MILLIGRAM(S): 500 TABLET, COATED ORAL at 00:44

## 2024-11-26 RX ADMIN — ACETAMINOPHEN 500MG 650 MILLIGRAM(S): 500 TABLET, COATED ORAL at 07:37

## 2024-11-26 RX ADMIN — Medication 10 MILLIGRAM(S): at 21:50

## 2024-11-26 RX ADMIN — ACETAMINOPHEN 500MG 650 MILLIGRAM(S): 500 TABLET, COATED ORAL at 19:01

## 2024-11-26 RX ADMIN — OXYCODONE HYDROCHLORIDE 5 MILLIGRAM(S): 30 TABLET ORAL at 05:23

## 2024-11-26 RX ADMIN — ACETAMINOPHEN 500MG 650 MILLIGRAM(S): 500 TABLET, COATED ORAL at 06:50

## 2024-11-26 RX ADMIN — AMLODIPINE BESYLATE 5 MILLIGRAM(S): 10 TABLET ORAL at 05:25

## 2024-11-26 RX ADMIN — OXYCODONE HYDROCHLORIDE 5 MILLIGRAM(S): 30 TABLET ORAL at 06:23

## 2024-11-26 RX ADMIN — POLYETHYLENE GLYCOL 3350 17 GRAM(S): 17 POWDER, FOR SOLUTION ORAL at 18:04

## 2024-11-26 RX ADMIN — Medication 2 TABLET(S): at 21:50

## 2024-11-26 RX ADMIN — OXYCODONE HYDROCHLORIDE 5 MILLIGRAM(S): 30 TABLET ORAL at 19:01

## 2024-11-26 RX ADMIN — ACETAMINOPHEN 500MG 650 MILLIGRAM(S): 500 TABLET, COATED ORAL at 18:02

## 2024-11-26 RX ADMIN — OXYCODONE HYDROCHLORIDE 5 MILLIGRAM(S): 30 TABLET ORAL at 11:32

## 2024-11-26 RX ADMIN — CEFEPIME 100 MILLIGRAM(S): 2 INJECTION, POWDER, FOR SOLUTION INTRAVENOUS at 05:23

## 2024-11-26 RX ADMIN — Medication 112 MICROGRAM(S): at 05:25

## 2024-11-26 RX ADMIN — ACETAMINOPHEN 500MG 650 MILLIGRAM(S): 500 TABLET, COATED ORAL at 12:32

## 2024-11-26 RX ADMIN — Medication 1 TABLET(S): at 11:32

## 2024-11-26 RX ADMIN — Medication 325 MILLIGRAM(S): at 11:32

## 2024-11-26 RX ADMIN — ACETAMINOPHEN 500MG 650 MILLIGRAM(S): 500 TABLET, COATED ORAL at 23:40

## 2024-11-26 RX ADMIN — Medication 500 MILLIGRAM(S): at 11:33

## 2024-11-26 NOTE — PROGRESS NOTE ADULT - SUBJECTIVE AND OBJECTIVE BOX
Surgery Progress Note     AGATHA COLE  [id number]  [facility] 9MON 925 W1    Interval/Subjective:  Patient seen and examined bedside, resting comfortable. no acute events overnight.   __________________________________________________________________  Vitals:  T(C): 37.2 (13:10), Max: 37.2 (13:10)  HR: 83 (13:10) (73 - 83)  BP: 103/87 (13:10) (96/59 - 117/64)  RR: 18 (13:10) (18 - 18)  SpO2: 97% (13:10) (94% - 97%)    I & O's:  IN:    IV PiggyBack: 100 mL    Oral Fluid: 1080 mL  Total IN: 1180 mL    OUT:  Total OUT: 0 mL        Physical Exam:  General Appearance: Appears well, NAD, A& O x 3. [place]  Chest: Nonlabored breathing on room air  CV: Hemodynamically stable  Extremities: RLE s/p BKA. L foot s/p TMA, dressing C/D/I. Palpable femoral pulses bilaterally, nonpalpable L pedal pulses. Motor and sensation grossly intact.     Labs:  CBC: 24 @ 06:41          --   -- >----< --          --    Chemistry: 24 @ 06:41  138|106|14    ------------< 84  3.8|20|0.85    Ca: 8.9 24 @ 06:41  M.9 24 @ 06:41  Phos: 2.7 24 @ 06:41    LFT's: 24 @ 06:41  AST: --  ALT: --  ALP: --  T.Bili: --  D.Bili: --  CBC: 24 @ 06:38          9.3   8.66 >----< 139          30.4    Chemistry: 24 @ 06:38  --|--|--    ------------< --  --|--|--    Ca: -- 24 @ 06:38  Mg: -- 24 @ 06:38  Phos: -- 24 @ 06:38    LFT's: 24 @ 06:38  AST: --  ALT: --  ALP: --  Conor: --  Daiv[place]: --    __________________________________________________________________

## 2024-11-26 NOTE — PROGRESS NOTE ADULT - SUBJECTIVE AND OBJECTIVE BOX
Patient is a 84y old  Female who presents with a chief complaint of chronic left osteomyelitis (25 Nov 2024 17:28)       INTERVAL HPI/OVERNIGHT EVENTS:  Patient seen and evaluated at bedside.  Pt is resting comfortable in NAD. Denies N/V/F/C.    Allergies    No Known Allergies    Intolerances        Vital Signs Last 24 Hrs  T(C): 36.9 (26 Nov 2024 09:57), Max: 37.8 (25 Nov 2024 13:27)  T(F): 98.5 (26 Nov 2024 09:57), Max: 100 (25 Nov 2024 13:27)  HR: 75 (26 Nov 2024 09:57) (73 - 82)  BP: 97/57 (26 Nov 2024 09:57) (96/59 - 117/64)  BP(mean): --  RR: 18 (26 Nov 2024 09:57) (18 - 18)  SpO2: 96% (26 Nov 2024 09:57) (94% - 96%)    Parameters below as of 26 Nov 2024 09:57  Patient On (Oxygen Delivery Method): room air        LABS:                        9.3    8.66  )-----------( 139      ( 25 Nov 2024 06:38 )             30.4     11-25    138  |  106  |  14  ----------------------------<  84  3.8   |  20[L]  |  0.85    Ca    8.9      25 Nov 2024 06:41  Phos  2.7     11-25  Mg     1.9     11-25        Urinalysis Basic - ( 25 Nov 2024 06:41 )    Color: x / Appearance: x / SG: x / pH: x  Gluc: 84 mg/dL / Ketone: x  / Bili: x / Urobili: x   Blood: x / Protein: x / Nitrite: x   Leuk Esterase: x / RBC: x / WBC x   Sq Epi: x / Non Sq Epi: x / Bacteria: x      CAPILLARY BLOOD GLUCOSE          Lower Extremity Physical Exam:  Vascular: DP/PT 0/4 B/L, CFT <3sec x 10, Temp gradient warm to cool B/L  Neurology: Epicritic sensation diminished to level of digits, B/L  Musculoskeletal/Ortho: unremarkable   Skin: 11/22 s/p left foot transmetatarsal amputation with tendoachilles lengthening, closed: sutures and staples intact, moderate sanguinous drainage, flaps cold with ischemic changes.     RADIOLOGY & ADDITIONAL TESTS:

## 2024-11-26 NOTE — PROGRESS NOTE ADULT - ASSESSMENT
84-year-old female with a past medical history of Raynaud's disease, crest syndrome, peripheral vascular disease status post right BKA, atrial fibrillation not on anticoagulation due to GI bleed, chronic left toe wound, amputation of the left second digit who was admitted to the hospital due to a previously scheduled left foot metatarsal amputation with Achilles lengthening.    Patient with no systemic or localizing symptoms on admission.  Underwent left foot metatarsal amputation and Achilles lengthening on 11/22/2024.  Low concern for residual bone infection, high concern for viability.    Patient admitted for further management postoperatively.  ID now consulted due to positive clean bone margin culture growing Serratia, strep agalactiae, Pseudomonas.    Since admission, patient has been afebrile.  Otherwise hemodynamically stable on room air.  Latest labs show no leukocytosis, anemia 9.3/30.4, BMP with renal function within GFR 54.    #Left foot wound status post transmetatarsal amputation with Achilles lengthening on 11/22/2024  #History of crest syndrome/Raynaud's on hydroxychloroquine  #PVD    Recommendations  Patient with postoperative clean margin cultures positive  Polymicrobial growth in cultures  Serratia and strep and clean margins  Serratia, strep and pseudomonas growth on swab culture initally  pathology is pending  OR culture may be contaminated therefore would await pathology report  If pathology negative for ostoemyelitis then clean margin culture likely contamination  If shows osteomyelitis will require 6 weeks of antibiotics if no further intervention  Discussed with patient and son rationale for possible further surgery for source control versus IV antibiotics for 6 weeks  Concern that given circulatory compromise antibiotics effectiveness would be markedly reduced  Follow fever curve and WBC count    Bryant Sandoval MD  Division of Infectious Diseases

## 2024-11-26 NOTE — PROGRESS NOTE ADULT - SUBJECTIVE AND OBJECTIVE BOX
Follow Up:  foot wound    Interval History/ROS:  Overnight: No acute events.  Patient remains afebrile.  Otherwise hemodynamically stable.  Latest labs show no leukocytosis, BMP with renal function within normal limits.    Seen examined at bedside.  No new complaints.    Allergies  No Known Allergies        ANTIMICROBIALS:  cefepime   IVPB 1000 every 8 hours  hydroxychloroquine 200 two times a day      OTHER MEDS:  MEDICATIONS  (STANDING):  acetaminophen     Tablet .. 650 every 6 hours  amLODIPine   Tablet 5 daily  atorvastatin 10 at bedtime  HYDROmorphone  Injectable 0.5 every 4 hours PRN  levothyroxine 112 daily  oxyCODONE    IR 5 every 4 hours PRN  pantoprazole    Tablet 40 before breakfast  polyethylene glycol 3350 17 two times a day  senna 2 at bedtime      Vital Signs Last 24 Hrs  T(C): 37.2 (26 Nov 2024 13:10), Max: 37.2 (26 Nov 2024 13:10)  T(F): 99 (26 Nov 2024 13:10), Max: 99 (26 Nov 2024 13:10)  HR: 83 (26 Nov 2024 13:10) (73 - 83)  BP: 103/87 (26 Nov 2024 13:10) (96/59 - 117/64)  BP(mean): --  RR: 18 (26 Nov 2024 13:10) (18 - 18)  SpO2: 97% (26 Nov 2024 13:10) (94% - 97%)    Parameters below as of 26 Nov 2024 13:10  Patient On (Oxygen Delivery Method): room air        PHYSICAL EXAM:  GA: NAD, AOx3  HEENT: oral cavity no lesion  CV: nl S1/S2, no RMG  Lungs: CTAB, No distress  Abd: BS+, soft, nontender, no rebounding pain  Ext: no edema  Neuro: No focal deficits  Skin: Intact  IV: no phlebitis                                  9.3    8.66  )-----------( 139      ( 25 Nov 2024 06:38 )             30.4       11-25    138  |  106  |  14  ----------------------------<  84  3.8   |  20[L]  |  0.85    Ca    8.9      25 Nov 2024 06:41  Phos  2.7     11-25  Mg     1.9     11-25        Urinalysis Basic - ( 25 Nov 2024 06:41 )    Color: x / Appearance: x / SG: x / pH: x  Gluc: 84 mg/dL / Ketone: x  / Bili: x / Urobili: x   Blood: x / Protein: x / Nitrite: x   Leuk Esterase: x / RBC: x / WBC x   Sq Epi: x / Non Sq Epi: x / Bacteria: x        MICROBIOLOGY:  v    Culture - Tissue with Gram Stain (collected 22 Nov 2024 17:09)  Source: Tissue  Gram Stain (23 Nov 2024 23:18):    No polymorphonuclear cells seen per low power field    No organisms seen per oil power field  Preliminary Report (23 Nov 2024 23:18):    Rare Serratia marcescens    Rare Streptococcus agalactiae (Group B)    Group B streptococci are susceptible to ampicillin,    penicillin and cefazolin, but may be resistant to    erythromycin and clindamycin.  Organism: Serratia marcescens (24 Nov 2024 17:07)  Organism: Serratia marcescens (24 Nov 2024 17:07)      Method Type: LENORE      -  Amoxicillin/Clavulanic Acid: R >16/8      -  Ampicillin: R >16 These ampicillin results predict results for amoxicillin      -  Ampicillin/Sulbactam: R >16/8      -  Aztreonam: S <=4      -  Cefazolin: R >16      -  Cefepime: S <=2      -  Cefoxitin: R <=8      -  Ceftriaxone: S <=1      -  Ciprofloxacin: R >2      -  Ertapenem: S <=0.5      -  Gentamicin: S <=2      -  Levofloxacin: R 2      -  Meropenem: S <=1      -  Piperacillin/Tazobactam: S <=8      -  Tobramycin: I 4      -  Trimethoprim/Sulfamethoxazole: S <=0.5/9.5    Culture - Wound Aerobic/Anaerobic (collected 22 Nov 2024 17:03)  Source: .Surgical Swab  Preliminary Report (25 Nov 2024 19:39):    Moderate Serratia marcescens    Few Pseudomonas aeruginosa    Numerous Corynebacterium striatum group "Susceptibilities not performed"    Rare Streptococcus agalactiae (Group B)    Group B streptococci are susceptible to ampicillin,    penicillin and cefazolin, but may be resistant to    erythromycin and clindamycin.  Organism: Serratia marcescens  Pseudomonas aeruginosa (24 Nov 2024 16:57)  Organism: Pseudomonas aeruginosa (24 Nov 2024 16:57)      Method Type: LENORE      -  Aztreonam: S <=4      -  Cefepime: S <=2      -  Ceftazidime: S <=1      -  Ciprofloxacin: S <=0.25      -  Imipenem: S 2      -  Levofloxacin: S <=0.5      -  Meropenem: S <=1      -  Piperacillin/Tazobactam: S <=8  Organism: Serratia marcescens (24 Nov 2024 16:57)      Method Type: LENORE      -  Amoxicillin/Clavulanic Acid: R >16/8      -  Ampicillin: R >16 These ampicillin results predict results for amoxicillin      -  Ampicillin/Sulbactam: R >16/8      -  Aztreonam: S <=4      -  Cefazolin: R >16      -  Cefepime: S <=2      -  Cefoxitin: R >16      -  Ceftriaxone: S <=1      -  Ciprofloxacin: R >2      -  Ertapenem: S <=0.5      -  Gentamicin: S <=2      -  Levofloxacin: R >4      -  Meropenem: S <=1      -  Piperacillin/Tazobactam: S <=8      -  Tobramycin: S <=2      -  Trimethoprim/Sulfamethoxazole: S <=0.5/9.5                    RADIOLOGY:  Imaging reviewed

## 2024-11-26 NOTE — PROGRESS NOTE ADULT - ASSESSMENT
85 yo female with PMH of Raynaud's disease, CREST syndrome, PVD, s/p RLE stent placement in 03/2022 at [place], s/p R BKA in 06/2022, s/p  in [facility], s/p Left TMA with achilles tendon lengthening on 11/22/24. Vascular surgery consulted for c/f viability of LLE foot incision.    Recs:  - podiatry cleared pt for dischare  - no further vascular surgery intervention noted  - pt can be discharged from vascular surgery standpoint     pt can follow up with [facility] Vascular surgery or with outpatient vascular surgeon     Vascular Surgery   [phone]

## 2024-11-26 NOTE — PROGRESS NOTE ADULT - SUBJECTIVE AND OBJECTIVE BOX
[facility] Division of Hospital Medicine  Yazmin Patel MD  Available via MS Teams      SUBJECTIVE / OVERNIGHT EVENTS: No acute events overnight. Pt overall feels well. Wants to go home    ADDITIONAL REVIEW OF SYSTEMS: ROS negative otherwise    MEDICATIONS  (STANDING):  acetaminophen     Tablet .. 650 milliGRAM(s) Oral every 6 hours  amLODIPine   Tablet 5 milliGRAM(s) Oral daily  ascorbic acid 500 milliGRAM(s) Oral daily  atorvastatin 10 milliGRAM(s) Oral at bedtime  cefepime   IVPB 1000 milliGRAM(s) IV Intermittent every 8 hours  cyanocobalamin 1000 MICROGram(s) Oral daily  ferrous    sulfate 325 milliGRAM(s) Oral daily  folic acid 1 milliGRAM(s) Oral daily  hydroxychloroquine 200 milliGRAM(s) Oral two times a day  levothyroxine 112 MICROGram(s) Oral daily  multivitamin 1 Tablet(s) Oral daily  pantoprazole    Tablet 40 milliGRAM(s) Oral before breakfast  polyethylene glycol 3350 17 Gram(s) Oral two times a day  senna 2 Tablet(s) Oral at bedtime    MEDICATIONS  (PRN):  HYDROmorphone  Injectable 0.5 milliGRAM(s) IV Push every 4 hours PRN Severe Pain (7 - 10)  oxyCODONE    IR 5 milliGRAM(s) Oral every 4 hours PRN Severe Pain (7 - 10)      I&O's Summary    25 Nov 2024 07:01  -  26 Nov 2024 07:00  --------------------------------------------------------  IN: 1180 mL / OUT: 0 mL / NET: 1180 mL    26 Nov 2024 07:01  -  26 Nov 2024 16:30  --------------------------------------------------------  IN: 480 mL / OUT: 0 mL / NET: 480 mL        PHYSICAL EXAM:  Vital Signs Last 24 Hrs  T(C): 37.2 (26 Nov 2024 13:10), Max: 37.2 (26 Nov 2024 13:10)  T(F): 99 (26 Nov 2024 13:10), Max: 99 (26 Nov 2024 13:10)  HR: 83 (26 Nov 2024 13:10) (73 - 83)  BP: 103/87 (26 Nov 2024 13:10) (96/59 - 117/64)  BP(mean): --  RR: 18 (26 Nov 2024 13:10) (18 - 18)  SpO2: 97% (26 Nov 2024 13:10) (94% - 97%)    Parameters below as of 26 Nov 2024 13:10  Patient On (Oxygen Delivery Method): room air      CONSTITUTIONAL: NAD, well-developed, well-groomed  RESPIRATORY: Normal respiratory effort; lungs are clear to auscultation bilaterally  CARDIOVASCULAR: Regular rate and rhythm, normal S1 and S2  ABDOMEN: Nontender to palpation  MUSCULOSKELETAL:  Left foot wrapped amputation at metarsal noted   PSYCH: A+O to person, place, and time;      LABS:                        9.3    8.66  )-----------( 139      ( 25 Nov 2024 06:38 )             30.4     11-25    138  |  106  |  14  ----------------------------<  84  3.8   |  20[L]  |  0.85    Ca    8.9      25 Nov 2024 06:41  Phos  2.7     11-25  Mg     1.9     11-25            Urinalysis Basic - ( 25 Nov 2024 06:41 )    Color: x / Appearance: x / SG: x / pH: x  Gluc: 84 mg/dL / Ketone: x  / Bili: x / Urobili: x   Blood: x / Protein: x / Nitrite: x   Leuk Esterase: x / RBC: x / WBC x   Sq Epi: x / Non Sq Epi: x / Bacteria: x            RADIOLOGY & ADDITIONAL TESTS:  New Imaging Personally Reviewed Today:  New Electrocardiogram Personally Reviewed Today:  Other Results Reviewed Today:   Prior or Outpatient Records Reviewed Today with Summary:    COORDINATION OF CARE:  Consultant Communication and Details of Discussion (where applicable):

## 2024-11-26 NOTE — PROGRESS NOTE ADULT - ASSESSMENT
83 yo female with PMH of Raynaud's disease, CREST syndrome, PVD, s/p R BKA, hx of afib not on a/c 2/2 GIB i/s/o of antiplatelet medication, chronic left toe wound, non-pressure chronic ulcer other part of left foot with necrosis of bone s/p amputation of L 2nd digit with dermal skin application in 3/2024 who presented for scheduled left foot metatarsal amputation with Achilles lengthening on 11/22/24. Admitted to medicine for post-op management =

## 2024-11-26 NOTE — PROGRESS NOTE ADULT - ASSESSMENT
84F s/p left foot transmetatarsal amputation w/ tendoachilles tendon lengthening, closed (DOS 11/22)   - Patient seen and evaluated  - Afebrile, no leukocytosis   - 11/22 s/p left foot transmetatarsal amputation with tendoachilles lengthening, closed: sutures and staples intact, moderate sanguinous drainage, flaps cold with ischemic changes.   - Intraop findings: low concern for residual infection/ high concern for viability   - Clean bone margin: Serratia marcescens Step agalactiae, pseudomonas   - OR wound culture: Serratia marsecens, Step agalactiae, pseudomonas   - Vasc recs, appreciated   - Id recs, appreciated. No further podiatric surgical intervention planned at this time.   - Pod stable for discharge pending final vasc recs/ final ID recs   - Follow up information in discharge note provider   - Discussed with attending

## 2024-11-27 LAB
ANION GAP SERPL CALC-SCNC: 12 MMOL/L — SIGNIFICANT CHANGE UP (ref 5–17)
BUN SERPL-MCNC: 14 MG/DL — SIGNIFICANT CHANGE UP (ref 7–23)
CALCIUM SERPL-MCNC: 9 MG/DL — SIGNIFICANT CHANGE UP (ref 8.4–10.5)
CHLORIDE SERPL-SCNC: 104 MMOL/L — SIGNIFICANT CHANGE UP (ref 96–108)
CO2 SERPL-SCNC: 21 MMOL/L — LOW (ref 22–31)
CREAT SERPL-MCNC: 0.74 MG/DL — SIGNIFICANT CHANGE UP (ref 0.5–1.3)
EGFR: 80 ML/MIN/1.73M2 — SIGNIFICANT CHANGE UP
GLUCOSE SERPL-MCNC: 115 MG/DL — HIGH (ref 70–99)
HCT VFR BLD CALC: 35.6 % — SIGNIFICANT CHANGE UP (ref 34.5–45)
HGB BLD-MCNC: 10.8 G/DL — LOW (ref 11.5–15.5)
MAGNESIUM SERPL-MCNC: 1.9 MG/DL — SIGNIFICANT CHANGE UP (ref 1.6–2.6)
MCHC RBC-ENTMCNC: 29 PG — SIGNIFICANT CHANGE UP (ref 27–34)
MCHC RBC-ENTMCNC: 30.3 G/DL — LOW (ref 32–36)
MCV RBC AUTO: 95.7 FL — SIGNIFICANT CHANGE UP (ref 80–100)
NRBC # BLD: 0 /100 WBCS — SIGNIFICANT CHANGE UP (ref 0–0)
PHOSPHATE SERPL-MCNC: 3.2 MG/DL — SIGNIFICANT CHANGE UP (ref 2.5–4.5)
PLATELET # BLD AUTO: 174 K/UL — SIGNIFICANT CHANGE UP (ref 150–400)
POTASSIUM SERPL-MCNC: 3.3 MMOL/L — LOW (ref 3.5–5.3)
POTASSIUM SERPL-SCNC: 3.3 MMOL/L — LOW (ref 3.5–5.3)
RBC # BLD: 3.72 M/UL — LOW (ref 3.8–5.2)
RBC # FLD: 15.6 % — HIGH (ref 10.3–14.5)
SODIUM SERPL-SCNC: 137 MMOL/L — SIGNIFICANT CHANGE UP (ref 135–145)
WBC # BLD: 8.92 K/UL — SIGNIFICANT CHANGE UP (ref 3.8–10.5)
WBC # FLD AUTO: 8.92 K/UL — SIGNIFICANT CHANGE UP (ref 3.8–10.5)

## 2024-11-27 PROCEDURE — 99233 SBSQ HOSP IP/OBS HIGH 50: CPT

## 2024-11-27 RX ORDER — POTASSIUM CHLORIDE 600 MG/1
40 TABLET, EXTENDED RELEASE ORAL ONCE
Refills: 0 | Status: COMPLETED | OUTPATIENT
Start: 2024-11-27 | End: 2024-11-27

## 2024-11-27 RX ORDER — CEFEPIME 2 G/1
1 INJECTION, POWDER, FOR SOLUTION INTRAVENOUS
Qty: 126 | Refills: 0
Start: 2024-11-27 | End: 2025-01-07

## 2024-11-27 RX ADMIN — Medication 1 MILLIGRAM(S): at 11:04

## 2024-11-27 RX ADMIN — ACETAMINOPHEN 500MG 650 MILLIGRAM(S): 500 TABLET, COATED ORAL at 12:03

## 2024-11-27 RX ADMIN — Medication 112 MICROGRAM(S): at 05:26

## 2024-11-27 RX ADMIN — CEFEPIME 100 MILLIGRAM(S): 2 INJECTION, POWDER, FOR SOLUTION INTRAVENOUS at 05:25

## 2024-11-27 RX ADMIN — Medication 1 TABLET(S): at 11:04

## 2024-11-27 RX ADMIN — PANTOPRAZOLE SODIUM 40 MILLIGRAM(S): 40 TABLET, DELAYED RELEASE ORAL at 05:26

## 2024-11-27 RX ADMIN — OXYCODONE HYDROCHLORIDE 5 MILLIGRAM(S): 30 TABLET ORAL at 09:54

## 2024-11-27 RX ADMIN — Medication 2 TABLET(S): at 21:51

## 2024-11-27 RX ADMIN — Medication 1000 MICROGRAM(S): at 11:04

## 2024-11-27 RX ADMIN — ACETAMINOPHEN 500MG 650 MILLIGRAM(S): 500 TABLET, COATED ORAL at 23:19

## 2024-11-27 RX ADMIN — ACETAMINOPHEN 500MG 650 MILLIGRAM(S): 500 TABLET, COATED ORAL at 00:40

## 2024-11-27 RX ADMIN — HYDROXYCHLOROQUINE SULFATE 200 MILLIGRAM(S): 200 TABLET, FILM COATED ORAL at 05:26

## 2024-11-27 RX ADMIN — OXYCODONE HYDROCHLORIDE 5 MILLIGRAM(S): 30 TABLET ORAL at 21:13

## 2024-11-27 RX ADMIN — ACETAMINOPHEN 500MG 650 MILLIGRAM(S): 500 TABLET, COATED ORAL at 06:25

## 2024-11-27 RX ADMIN — Medication 325 MILLIGRAM(S): at 11:04

## 2024-11-27 RX ADMIN — ACETAMINOPHEN 500MG 650 MILLIGRAM(S): 500 TABLET, COATED ORAL at 05:26

## 2024-11-27 RX ADMIN — POLYETHYLENE GLYCOL 3350 17 GRAM(S): 17 POWDER, FOR SOLUTION ORAL at 05:26

## 2024-11-27 RX ADMIN — AMLODIPINE BESYLATE 5 MILLIGRAM(S): 10 TABLET ORAL at 05:26

## 2024-11-27 RX ADMIN — ACETAMINOPHEN 500MG 650 MILLIGRAM(S): 500 TABLET, COATED ORAL at 11:03

## 2024-11-27 RX ADMIN — Medication 10 MILLIGRAM(S): at 21:51

## 2024-11-27 RX ADMIN — CEFEPIME 100 MILLIGRAM(S): 2 INJECTION, POWDER, FOR SOLUTION INTRAVENOUS at 13:12

## 2024-11-27 RX ADMIN — POTASSIUM CHLORIDE 40 MILLIEQUIVALENT(S): 600 TABLET, EXTENDED RELEASE ORAL at 11:03

## 2024-11-27 RX ADMIN — ACETAMINOPHEN 500MG 650 MILLIGRAM(S): 500 TABLET, COATED ORAL at 18:55

## 2024-11-27 RX ADMIN — Medication 500 MILLIGRAM(S): at 11:04

## 2024-11-27 RX ADMIN — ACETAMINOPHEN 500MG 650 MILLIGRAM(S): 500 TABLET, COATED ORAL at 17:55

## 2024-11-27 RX ADMIN — ACETAMINOPHEN 500MG 650 MILLIGRAM(S): 500 TABLET, COATED ORAL at 23:49

## 2024-11-27 RX ADMIN — OXYCODONE HYDROCHLORIDE 5 MILLIGRAM(S): 30 TABLET ORAL at 10:54

## 2024-11-27 RX ADMIN — OXYCODONE HYDROCHLORIDE 5 MILLIGRAM(S): 30 TABLET ORAL at 20:43

## 2024-11-27 RX ADMIN — CEFEPIME 100 MILLIGRAM(S): 2 INJECTION, POWDER, FOR SOLUTION INTRAVENOUS at 21:51

## 2024-11-27 RX ADMIN — HYDROXYCHLOROQUINE SULFATE 200 MILLIGRAM(S): 200 TABLET, FILM COATED ORAL at 18:01

## 2024-11-27 NOTE — PROGRESS NOTE ADULT - ASSESSMENT
84-year-old female with a past medical history of Raynaud's disease, crest syndrome, peripheral vascular disease status post right BKA, atrial fibrillation not on anticoagulation due to GI bleed, chronic left toe wound, amputation of the left second digit who was admitted to the hospital due to a previously scheduled left foot metatarsal amputation with Achilles lengthening.    Patient with no systemic or localizing symptoms on admission.  Underwent left foot metatarsal amputation and Achilles lengthening on 11/22/2024.  Low concern for residual bone infection, high concern for viability.    Patient admitted for further management postoperatively.  ID now consulted due to positive clean bone margin culture growing Serratia, strep agalactiae, Pseudomonas.    Since admission, patient has been afebrile.  Otherwise hemodynamically stable on room air.  Latest labs show no leukocytosis, anemia 9.3/30.4, BMP with renal function within GFR 54.    #Left foot wound status post transmetatarsal amputation with Achilles lengthening on 11/22/2024  #History of crest syndrome/Raynaud's on hydroxychloroquine  #PVD    Recommendations  Patient with postoperative clean margin cultures positive  Polymicrobial growth in cultures  Serratia and strep and clean margins  Serratia, strep and pseudomonas growth on swab culture initally  pathology is pending  OR culture may be contaminated therefore would await pathology report  If pathology negative for ostoemyelitis then clean margin culture likely contamination. Can follow as outpatient.  Can provide a PICC line.  Tentatively start 6 wks of cefepime 1g IV Q8H counting from the date of surgery (end 1/3/25).  To review path as outpatient and decide whether or not to continue x6 wks vs discontinuing.  If shows osteomyelitis will require 6 weeks of antibiotics if no further intervention  Previously Dr. Sandoval discussed with patient and son re: rationale for possible further surgery for source control versus IV antibiotics for 6 weeks  Spoke again (11/27) with patient and daughter as above.  Concern that given circulatory compromise antibiotics effectiveness would be markedly reduced  Follow fever curve and WBC count    Please send weekly CBC, CMP, ESR, and CRP to [email or web address], [email or web address] to Dr. Bryant Sandoval.  Please call [phone] to set up a follow-up appointment to see Dr. Bryant Sandoval at ID clinic ([address] [facility], [facility]) within 1-2 weeks of discharge.    Plan discussed with primary team attending Dr. Estrada and primary team ACP.  Thank you for this consult.      Delbert Suarez MD, PhD  Attending Physician  Division of Infectious Diseases  Department of Medicine    Please contact through MS Teams message.  Office: [phone] (after 5 PM or weekend)   84-year-old female with a past medical history of Raynaud's disease, crest syndrome, peripheral vascular disease status post right BKA, atrial fibrillation not on anticoagulation due to GI bleed, chronic left toe wound, amputation of the left second digit who was admitted to the hospital due to a previously scheduled left foot metatarsal amputation with Achilles lengthening.    Patient with no systemic or localizing symptoms on admission.  Underwent left foot metatarsal amputation and Achilles lengthening on 11/22/2024.  Low concern for residual bone infection, high concern for viability.    Patient admitted for further management postoperatively.  ID now consulted due to positive clean bone margin culture growing Serratia, strep agalactiae, Pseudomonas.    Since admission, patient has been afebrile.  Otherwise hemodynamically stable on room air.  Latest labs show no leukocytosis, anemia 9.3/30.4, BMP with renal function within GFR 54.    #Left foot wound status post transmetatarsal amputation with Achilles lengthening on 11/22/2024  #History of crest syndrome/Raynaud's on hydroxychloroquine  #PVD    Recommendations  Patient with postoperative clean margin cultures positive  Polymicrobial growth in cultures  Serratia and strep and clean margins  Serratia, strep and pseudomonas growth on swab culture initally  pathology is pending  OR culture may be contaminated therefore would await pathology report  If pathology negative for ostoemyelitis then clean margin culture likely contamination. Can follow as outpatient.  Can provide a PICC line.  Tentatively start 6 wks of cefepime 1g IV Q8H counting from the date of surgery (end 1/3/25).  To review path as outpatient and decide whether or not to continue x6 wks vs discontinuing.  Discussed with daughter re: medication side effects, PICC line adverse effects, and contingency plan.  If shows osteomyelitis will require 6 weeks of antibiotics if no further intervention  Previously Dr. Sandoval discussed with patient and son re: rationale for possible further surgery for source control versus IV antibiotics for 6 weeks  Spoke again (11/27) with patient and daughter as above.  Concern that given circulatory compromise antibiotics effectiveness would be markedly reduced  Follow fever curve and WBC count    Please send weekly CBC, CMP, ESR, and CRP to [email or web address], attention to Dr. Bryant Sandoval.  Please call [phone] to set up a follow-up appointment to see Dr. Bryant Lori at ID clinic ([address], [facility]) within 1-2 weeks of discharge.    Plan discussed with primary team attending Dr. Estrada and primary team ACP.  Thank you for this consult.      Delbert Suarez MD, PhD  Attending Physician  Division of Infectious Diseases  Department of Medicine    Please contact through MS Teams message.  Office: [phone] (after 5 PM or weekend)

## 2024-11-27 NOTE — PROGRESS NOTE ADULT - SUBJECTIVE AND OBJECTIVE BOX
Follow Up:    OM    Interval History/ROS:  VSS ON. Patient was seen and examined at bedside. Denies fever. +[place]. No diarrhea.  Spoke with daughter at bedside at length re: clinical management plan.    Allergies  No Known Allergies        ANTIMICROBIALS:  cefepime   IVPB 1000 every 8 hours  hydroxychloroquine 200 two times a day      OTHER MEDS:  MEDICATIONS  (STANDING):  acetaminophen     Tablet .. 650 every 6 hours  amLODIPine   Tablet 5 daily  atorvastatin 10 at bedtime  HYDROmorphone  Injectable 0.5 every 4 hours PRN  levothyroxine 112 daily  oxyCODONE    IR 5 every 4 hours PRN  pantoprazole    Tablet 40 before breakfast  polyethylene glycol 3350 17 two times a day  senna 2 at bedtime      Vital Signs Last 24 Hrs  T(C): 36.7 (27 Nov 2024 13:25), Max: 37 (26 Nov 2024 21:00)  T(F): 98 (27 Nov 2024 13:25), Max: 98.6 (26 Nov 2024 21:00)  HR: 74 (27 Nov 2024 13:25) (74 - 78)  BP: 98/58 (27 Nov 2024 13:25) (98/58 - 117/70)  BP(mean): --  RR: 18 (27 Nov 2024 13:25) (18 - 18)  SpO2: 92% (27 Nov 2024 13:25) (92% - 97%)    Parameters below as of 27 Nov 2024 13:25  Patient On (Oxygen Delivery Method): room air        PHYSICAL EXAM:  Constitutional: non-toxic, no distress  Cardiovascular:   normal S1, S2, no murmur, RRR  Respiratory:  clear BS bilaterally, no wheezes, no rales  GI:  soft, non-tender, normal bowel sounds  Musculoskeletal:  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; L foot s/p TMA, surgical site w/ bleeding, no purulence  Neurologic: awake and alert, [place]                                10.8   8.92  )-----------( 174      ( 27 Nov 2024 07:45 )             35.6       11-27    137  |  104  |  14  ----------------------------<  115[H]  3.3[L]   |  21[L]  |  0.74    Ca    9.0      27 Nov 2024 09:21  Phos  3.2     11-27  Mg     1.9     11-27        Urinalysis Basic - ( 27 Nov 2024 09:21 )    Color: x / Appearance: x / SG: x / pH: x  Gluc: 115 mg/dL / Ketone: x  / Bili: x / Urobili: x   Blood: x / Protein: x / Nitrite: x   Leuk Esterase: x / RBC: x / WBC x   Sq Epi: x / Non Sq Epi: x / Bacteria: x        MICROBIOLOGY:  v  Tissue  11-22-24   Rare Serratia marcescens  Rare Streptococcus agalactiae (Group B)  Group B streptococci are susceptible to ampicillin,  penicillin and cefazolin, but may be resistant to  erythromycin and clindamycin.  --  Serratia marcescens      .Surgical Swab  11-22-24   Moderate Serratia marcescens  Few Pseudomonas aeruginosa  Numerous Corynebacterium striatum group "Susceptibilities not performed"  Rare Streptococcus agalactiae (Group B)  Group B streptococci are susceptible to ampicillin,  penicillin and cefazolin, but may be resistant to  erythromycin and clindamycin.  --  Serratia marcescens  Pseudomonas aeruginosa                RADIOLOGY:  Imaging below independently reviewed.  < from: Xray Foot AP + Lateral + Oblique, Left (11.22.24 @ 17:17) >    ACC: [id number] EXAM:  XR FOOT COMP MIN 3 VIEWS LT   ORDERED BY:  GLORIA ROMAN     PROCEDURE DATE:  11/22/2024          INTERPRETATION:  CLINICAL INDICATION: baseline postoperative evaluation   status post left foot TMA    EXAM:  Frontal oblique lateral left foot from 11/22/2024 at 1717. Compared to   prior study from 5/10/2024.    IMPRESSION:  Status post TMA with sharp smooth osseous stump margins and well   formed/shaped amputation parabola.    Post surgical changes in the overlying soft tissues with Penrose type   surgical drain present across stump margins and with surgical staples   across the stump closure site.    Small lucencies over distal Achilles region on lateral view probably   related to concurrently performed tendo-Achilles lengthening procedure.   Otherwise, no proximally tracking gas collections beyond the amputation   margins and no focal areas of osteomyelitis.    Remainder of foot unchanged. Also correlate with intraoperative findings.    --- End of Report ---            JULIUS WOOD MD; Attending Radiologist  This document has been electronically signed. Nov 22 2024  5:39PM    < end of copied text >

## 2024-11-27 NOTE — CHART NOTE - NSCHARTNOTEFT_GEN_A_CORE
Spoke w/ Dr. Suarez.  Patient will require a PICC line for L Foot Osteomyelitis. Consent obtained for PICC line placement, form placed in chart.    Prescriptions given to  for discharge planning.
Pt seen at bedside to measure and fit with camboo to be used OOB following surgery.  Boot sized and adjusted to accommodate post op dressing,  Pt instructed to don and doff  Follow up if needed      [place]  [phone]

## 2024-11-27 NOTE — PROGRESS NOTE ADULT - ASSESSMENT
84F with Raynaud disease, CREST syndrome, PVD, s/p R BKA, AFib presented for L foot metatarsal amputation on 11/22, now pending discharge with 6 weeks of IV abx.

## 2024-11-27 NOTE — PROVIDER CONTACT NOTE (MEDICATION) - BACKGROUND
Pt admitted for L foot osteomyelitis s/p L foot transmetatarsal amputation.  PMH Raynaud's disease, CREST syndrome, PVD, R BKA

## 2024-11-27 NOTE — PROGRESS NOTE ADULT - SUBJECTIVE AND OBJECTIVE BOX
Julián Estrada MD  Division of Hospital Medicine  Available on [employer] Teams    PROGRESS NOTE:     Patient is a 84y old  Female who presents with a chief complaint of chronic left osteomyelitis (27 Nov 2024 13:56)      SUBJECTIVE / OVERNIGHT EVENTS: Patient seen and examined. States her desire to leave the hospital. Feels well today.    MEDICATIONS  (STANDING):  acetaminophen     Tablet .. 650 milliGRAM(s) Oral every 6 hours  amLODIPine   Tablet 5 milliGRAM(s) Oral daily  ascorbic acid 500 milliGRAM(s) Oral daily  atorvastatin 10 milliGRAM(s) Oral at bedtime  cefepime   IVPB 1000 milliGRAM(s) IV Intermittent every 8 hours  cyanocobalamin 1000 MICROGram(s) Oral daily  ferrous    sulfate 325 milliGRAM(s) Oral daily  folic acid 1 milliGRAM(s) Oral daily  hydroxychloroquine 200 milliGRAM(s) Oral two times a day  levothyroxine 112 MICROGram(s) Oral daily  multivitamin 1 Tablet(s) Oral daily  pantoprazole    Tablet 40 milliGRAM(s) Oral before breakfast  polyethylene glycol 3350 17 Gram(s) Oral two times a day  senna 2 Tablet(s) Oral at bedtime    MEDICATIONS  (PRN):  HYDROmorphone  Injectable 0.5 milliGRAM(s) IV Push every 4 hours PRN Severe Pain (7 - 10)  oxyCODONE    IR 5 milliGRAM(s) Oral every 4 hours PRN Severe Pain (7 - 10)      CAPILLARY BLOOD GLUCOSE        I&O's Summary    26 Nov 2024 07:01  -  27 Nov 2024 07:00  --------------------------------------------------------  IN: 1170 mL / OUT: 0 mL / NET: 1170 mL    27 Nov 2024 07:01  -  27 Nov 2024 17:30  --------------------------------------------------------  IN: 480 mL / OUT: 1150 mL / NET: -670 mL        PHYSICAL EXAM:  Vital Signs Last 24 Hrs  T(C): 36.7 (27 Nov 2024 13:25), Max: 37 (26 Nov 2024 21:00)  T(F): 98 (27 Nov 2024 13:25), Max: 98.6 (26 Nov 2024 21:00)  HR: 74 (27 Nov 2024 13:25) (74 - 78)  BP: 98/58 (27 Nov 2024 13:25) (98/58 - 117/70)  BP(mean): --  RR: 18 (27 Nov 2024 13:25) (18 - 18)  SpO2: 92% (27 Nov 2024 13:25) (92% - 97%)    Parameters below as of 27 Nov 2024 13:25  Patient On (Oxygen Delivery Method): room air    CONSTITUTIONAL: NAD, well-developed, well-groomed  RESPIRATORY: Normal respiratory effort; lungs are clear to auscultation bilaterally  CARDIOVASCULAR: Regular rate and rhythm, normal S1 and S2  ABDOMEN: Nontender to palpation  MUSCULOSKELETAL:  Left foot wrapped amputation at metarsal noted   PSYCH: A+O to person, place, and time;    LABS:                        10.8   8.92  )-----------( 174      ( 27 Nov 2024 07:45 )             35.6     11-27    137  |  104  |  14  ----------------------------<  115[H]  3.3[L]   |  21[L]  |  0.74    Ca    9.0      27 Nov 2024 09:21  Phos  3.2     11-27  Mg     1.9     11-27            Urinalysis Basic - ( 27 Nov 2024 09:21 )    Color: x / Appearance: x / SG: x / pH: x  Gluc: 115 mg/dL / Ketone: x  / Bili: x / Urobili: x   Blood: x / Protein: x / Nitrite: x   Leuk Esterase: x / RBC: x / WBC x   Sq Epi: x / Non Sq Epi: x / Bacteria: x          RADIOLOGY & ADDITIONAL TESTS:  Results Reviewed: Y  Imaging Personally Reviewed:Y  Electrocardiogram Personally Reviewed:Y    COORDINATION OF CARE:  Care Discussed with Consultants/Other Providers [Y/N]:Y  Prior or Outpatient Records Reviewed [Y/N]:Y

## 2024-11-28 LAB
ANION GAP SERPL CALC-SCNC: 13 MMOL/L — SIGNIFICANT CHANGE UP (ref 5–17)
BUN SERPL-MCNC: 19 MG/DL — SIGNIFICANT CHANGE UP (ref 7–23)
CALCIUM SERPL-MCNC: 8.4 MG/DL — SIGNIFICANT CHANGE UP (ref 8.4–10.5)
CHLORIDE SERPL-SCNC: 106 MMOL/L — SIGNIFICANT CHANGE UP (ref 96–108)
CO2 SERPL-SCNC: 19 MMOL/L — LOW (ref 22–31)
CREAT SERPL-MCNC: 0.77 MG/DL — SIGNIFICANT CHANGE UP (ref 0.5–1.3)
EGFR: 76 ML/MIN/1.73M2 — SIGNIFICANT CHANGE UP
GLUCOSE SERPL-MCNC: 85 MG/DL — SIGNIFICANT CHANGE UP (ref 70–99)
HCT VFR BLD CALC: 29 % — LOW (ref 34.5–45)
HGB BLD-MCNC: 9.1 G/DL — LOW (ref 11.5–15.5)
MCHC RBC-ENTMCNC: 29.5 PG — SIGNIFICANT CHANGE UP (ref 27–34)
MCHC RBC-ENTMCNC: 31.4 G/DL — LOW (ref 32–36)
MCV RBC AUTO: 94.2 FL — SIGNIFICANT CHANGE UP (ref 80–100)
NRBC # BLD: 0 /100 WBCS — SIGNIFICANT CHANGE UP (ref 0–0)
PLATELET # BLD AUTO: 179 K/UL — SIGNIFICANT CHANGE UP (ref 150–400)
POTASSIUM SERPL-MCNC: 4.1 MMOL/L — SIGNIFICANT CHANGE UP (ref 3.5–5.3)
POTASSIUM SERPL-SCNC: 4.1 MMOL/L — SIGNIFICANT CHANGE UP (ref 3.5–5.3)
RBC # BLD: 3.08 M/UL — LOW (ref 3.8–5.2)
RBC # FLD: 15.4 % — HIGH (ref 10.3–14.5)
SODIUM SERPL-SCNC: 138 MMOL/L — SIGNIFICANT CHANGE UP (ref 135–145)
WBC # BLD: 7.16 K/UL — SIGNIFICANT CHANGE UP (ref 3.8–10.5)
WBC # FLD AUTO: 7.16 K/UL — SIGNIFICANT CHANGE UP (ref 3.8–10.5)

## 2024-11-28 PROCEDURE — 71045 X-RAY EXAM CHEST 1 VIEW: CPT | Mod: 26

## 2024-11-28 PROCEDURE — 99232 SBSQ HOSP IP/OBS MODERATE 35: CPT

## 2024-11-28 RX ORDER — CHLORHEXIDINE GLUCONATE 1.2 MG/ML
1 RINSE ORAL
Refills: 0 | Status: DISCONTINUED | OUTPATIENT
Start: 2024-11-28 | End: 2024-11-29

## 2024-11-28 RX ORDER — ACETAMINOPHEN 500MG 500 MG/1
650 TABLET, COATED ORAL ONCE
Refills: 0 | Status: COMPLETED | OUTPATIENT
Start: 2024-11-28 | End: 2024-11-28

## 2024-11-28 RX ORDER — SODIUM CHLORIDE 9 MG/ML
10 INJECTION, SOLUTION INTRAMUSCULAR; INTRAVENOUS; SUBCUTANEOUS
Refills: 0 | Status: DISCONTINUED | OUTPATIENT
Start: 2024-11-28 | End: 2024-11-29

## 2024-11-28 RX ADMIN — Medication 1 MILLIGRAM(S): at 11:57

## 2024-11-28 RX ADMIN — HYDROXYCHLOROQUINE SULFATE 200 MILLIGRAM(S): 200 TABLET, FILM COATED ORAL at 17:09

## 2024-11-28 RX ADMIN — CEFEPIME 100 MILLIGRAM(S): 2 INJECTION, POWDER, FOR SOLUTION INTRAVENOUS at 06:00

## 2024-11-28 RX ADMIN — ACETAMINOPHEN 500MG 650 MILLIGRAM(S): 500 TABLET, COATED ORAL at 12:56

## 2024-11-28 RX ADMIN — Medication 325 MILLIGRAM(S): at 11:56

## 2024-11-28 RX ADMIN — ACETAMINOPHEN 500MG 650 MILLIGRAM(S): 500 TABLET, COATED ORAL at 06:00

## 2024-11-28 RX ADMIN — OXYCODONE HYDROCHLORIDE 5 MILLIGRAM(S): 30 TABLET ORAL at 22:12

## 2024-11-28 RX ADMIN — PANTOPRAZOLE SODIUM 40 MILLIGRAM(S): 40 TABLET, DELAYED RELEASE ORAL at 06:01

## 2024-11-28 RX ADMIN — ACETAMINOPHEN 500MG 650 MILLIGRAM(S): 500 TABLET, COATED ORAL at 11:56

## 2024-11-28 RX ADMIN — ACETAMINOPHEN 500MG 650 MILLIGRAM(S): 500 TABLET, COATED ORAL at 06:30

## 2024-11-28 RX ADMIN — Medication 10 MILLIGRAM(S): at 21:43

## 2024-11-28 RX ADMIN — Medication 1000 MICROGRAM(S): at 11:57

## 2024-11-28 RX ADMIN — CEFEPIME 100 MILLIGRAM(S): 2 INJECTION, POWDER, FOR SOLUTION INTRAVENOUS at 13:31

## 2024-11-28 RX ADMIN — Medication 500 MILLIGRAM(S): at 11:57

## 2024-11-28 RX ADMIN — CEFEPIME 100 MILLIGRAM(S): 2 INJECTION, POWDER, FOR SOLUTION INTRAVENOUS at 21:41

## 2024-11-28 RX ADMIN — POLYETHYLENE GLYCOL 3350 17 GRAM(S): 17 POWDER, FOR SOLUTION ORAL at 06:02

## 2024-11-28 RX ADMIN — OXYCODONE HYDROCHLORIDE 5 MILLIGRAM(S): 30 TABLET ORAL at 09:17

## 2024-11-28 RX ADMIN — CHLORHEXIDINE GLUCONATE 1 APPLICATION(S): 1.2 RINSE ORAL at 21:41

## 2024-11-28 RX ADMIN — Medication 1 TABLET(S): at 11:57

## 2024-11-28 RX ADMIN — OXYCODONE HYDROCHLORIDE 5 MILLIGRAM(S): 30 TABLET ORAL at 21:42

## 2024-11-28 RX ADMIN — OXYCODONE HYDROCHLORIDE 5 MILLIGRAM(S): 30 TABLET ORAL at 18:03

## 2024-11-28 RX ADMIN — HYDROXYCHLOROQUINE SULFATE 200 MILLIGRAM(S): 200 TABLET, FILM COATED ORAL at 06:01

## 2024-11-28 RX ADMIN — OXYCODONE HYDROCHLORIDE 5 MILLIGRAM(S): 30 TABLET ORAL at 10:17

## 2024-11-28 RX ADMIN — Medication 112 MICROGRAM(S): at 06:02

## 2024-11-28 RX ADMIN — OXYCODONE HYDROCHLORIDE 5 MILLIGRAM(S): 30 TABLET ORAL at 17:08

## 2024-11-28 NOTE — PROGRESS NOTE ADULT - PROBLEM SELECTOR PLAN 7
DVT ppx: SCD to LLE    Dispo: ID and vascular recs  patient uses RLE prosthesis. family to ensure is available for PT eval, has 24/7 aid    called vidaer to update, voicemail left
DVT ppx: SCD to LLE    Dispo: home with IV abx  patient uses RLE prosthesis. family to ensure is available for PT eval, has 24/7 aid
DVT ppx: SCD to LLE    Dispo: ID and vascular recs  patient uses RLE prosthesis. family to ensure is available for PT eval, has 24/7 aid    Called dtr Ramona [phone] updated on plan ffor abx and OP follow up
DVT ppx: SCD to LLE    Dispo: ID and vascular recs  patient uses RLE prosthesis. family to ensure is available for PT eval.
DVT ppx: SCD to LLE    Dispo: home with IV abx  patient uses RLE prosthesis. family to ensure is available for PT eval, has 24/7 aid
DVT ppx: SCD to LLE    Dispo: pending CAM boot delivery, PT eval, and negative cx x 48 hours  patient uses RLE prosthesis. family to ensure is available for PT eval.

## 2024-11-28 NOTE — PROGRESS NOTE ADULT - ASSESSMENT
84F with Raynaud disease, CREST syndrome, PVD, s/p R BKA, AFib presented for L foot metatarsal amputation on 11/22, now pending discharge with 6 weeks of IV abx for OM.

## 2024-11-28 NOTE — PROGRESS NOTE ADULT - PROBLEM SELECTOR PLAN 2
stable.  - c/w home amlodipine 5mg daily

## 2024-11-28 NOTE — ADVANCED PRACTICE NURSE CONSULT - REASON FOR CONSULT
Vascular Access Team    Evaluation for: Bedside SL PICC placement  Requested by name: Marsha Lee  Date/Time: 11/27@08:22    Indication: cefepime x 6 weeks for OM  Allergy: nka     Anticoagulants/ antiplatelets: no    Platelets(>20): 174  INR(<3): none  eGFR(>40): 68  Blood cultures sent: none    Arm restrictions: no    Consent obtained: yes    Comments: Bedside picc order evaluated. If any questions, call [phone] for the VAT RN.  
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 Patient denies any fever, chills, chest pain, nor dyspnea. Post-op pain controlled.    Vitals in PACU stable. Admitted to medicine for further management and PT eval.      
Bedside PICC line ordered for IV abx x6 wks

## 2024-11-28 NOTE — ADVANCED PRACTICE NURSE CONSULT - RECOMMEDATIONS
Post procedure verbal instructions given to the patient or patient representative. Patient or patient representative  instructed regarding signs and symptoms of infection. Patient instructed to keep dressing dry and clean. Care for catheter as per hospital protocols.  
Impression:     left heel, cannot rule out a deep tissue injury present on admission   B/L buttocks/sacrum, cannot rule out a deep tissue injury present on admission   B/L ischium, cannot rule out a deep tissue injury present on admission     Recommendations:     1) turn and position q2 and PRN utilizing offloading assistive devices  2) routine pericare daily and PRN soiling  3) encourage optimal nutrition  4) waffle cushion when oob to chair  5) B/L LE complete cair air fluidized boots or carlos a-lock pillow to offload heels/feet  6) triad protective barrier cream to B/L buttocks/sacrum/ischium daily and PRN soiling  7) incontinence management - consider external urinary catheter to divert urine from skin if incontinent  8) left foot wound, consider podiatry/vacular recommendations     Plan discussed with ELADIO Marina on unit    For questions/comments regarding the recommendations in this consult, please contact Tina Reyes via teams. Wound care will not actively follow, please place future consults for new concerns. Thank you!

## 2024-11-28 NOTE — ADVANCED PRACTICE NURSE CONSULT - ASSESSMENT
Patient encountered on 9 Monti. When wound care RN arrived on unit, patient was found lying in a low air loss pressure redistribution support surface style bed. Patient was alert and oriented and gave consent to skin consult. This patient is able to turn independently and staff assistance x 1 was provided. Once turned, the wound care RN was able to visualize an area of persistent nonblanchable purple hued discoloration over B/L buttocks/sacral skin, area measures approximately 8cm x 4cm x 0cm - consistent with a deep tissue injury present on admission. Dark erythema was noted over B/L ischium measuring approximately 4cm x 4cm x 0cm- cannot rule out a dep tissue injury present on admission. Purple hued discoloration noted over left heel, area measures approximately 4cm x 4cm x 0cm - cannot rule out a deep tissue injury present on admission. Once consult was complete, patient was educated regarding the need for routine turning and positioning to prevent pressure injuries and patient was placed in a left side-lying position utilizing pillow positioner assistive devices.
Central Line Catheter Insertion Note  Patient or patient representative educated about central line associated blood stream infection prevention practices.    Catheter type: SL Power PICC  : Bard/ [id number]  Power injectable: Yes  Procedure assisted by: Felicita Cochran RN    Informed consent obtained by covering floor team. Time out was preformed, confirming the patient's first and last name, date of birth, procedure, and correct site prior to state of procedure.    Patient was placed with HOB up 30 degrees. Patient placement site was prepped with chlorhexidine solution, then draped using maximum sterile barrier protection. The area was injected with 2 ml of 1% lidocaine. Using the ultrasound, the catheter was introduced. Strict adherence to outline aseptic technique. Upon completion of line placement, the insertion site was covered with a sterile occlusive dressing. Pt tolerated procedure well. Minimal blood loss.     All materials used for catheter insertion, including the intact guide wires, were accounted for at the end of the procedure.    Number of attempts: 1  Complications/Comments: none    Emergency Placement: No  Site: New site  Anatomical Site of insertion: R Brachial  Catheter size/length: 4 Fr., 35 cm.   US guided Bard SL Power PICC placed    Pre/post-procedure vitals WDL  Post procedure verification with CXR as per orders.

## 2024-11-28 NOTE — PROGRESS NOTE ADULT - PROBLEM SELECTOR PROBLEM 1
Chronic osteomyelitis of left foot

## 2024-11-28 NOTE — PROGRESS NOTE ADULT - PROBLEM/PLAN-1
DISPLAY PLAN FREE TEXT
Yes...
DISPLAY PLAN FREE TEXT
DISPLAY PLAN FREE TEXT

## 2024-11-28 NOTE — PROGRESS NOTE ADULT - PROBLEM SELECTOR PLAN 1
s/p left foot metatarsal amputation with Achilles lengthening on 11/22/24  - c/w cefepime x6 weeks - abx will be discontinued if pathology comes back negative for OM  - greatly appreciate ID assistance
s/p left foot metatarsal amputation with Achilles lengthening on 11/22/24  - low concern for residual infection  - tissue culture and OR culture growing serratia and strep agalactiae  - started on ancef, ID consulted   - Dilaudid and oxycodone prn for pain  - post-op care as per Podiatry  - CAM boot, pt rec hPT
s/p left foot metatarsal amputation with Achilles lengthening on 11/22/24  - c/w cefepime x6 weeks - abx will be discontinued if pathology comes back negative for OM  - greatly appreciate ID assistance
s/p left foot metatarsal amputation with Achilles lengthening on 11/22/24  - low concern for residual infection  - f/u bone margin cx - awaiting 48 hours  - Dilaudid and oxycodone prn for pain  - post-op care as per Podiatry  - pending CAM boot delivery then subsequent PT eval
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

## 2024-11-28 NOTE — PROGRESS NOTE ADULT - PROBLEM SELECTOR PLAN 4
- c/w home b12, folic acid, iron supplementation  - f/u CBC in AM
- c/w home b12, folic acid, iron supplementation  - hgb stable

## 2024-11-28 NOTE — PROGRESS NOTE ADULT - TIME BILLING
- Chart review  - Independent review and interpretation of data  - Bedside physical examination, patient assessment, and evaluation.  - Discussion with patient and family  - OPAT arrangement  - Medical decision making  - Discussion with other providers  - Care coordination
- Reviewing, and interpreting labs and testing.  - Independently obtaining a review of systems and performing a physical exam  - Reviewing consultant documentation/recommendations in addition to discussing plan of care with consultants.  - Counselling and educating patient and family regarding interpretation of aforementioned items and plan of care.
- Reviewing, and interpreting labs and testing.  - Independently obtaining a review of systems and performing a physical exam  - Reviewing consultant documentation/recommendations in addition to discussing plan of care with consultants.  - Counselling and educating patient and family regarding interpretation of aforementioned items and plan of care.

## 2024-11-28 NOTE — PROGRESS NOTE ADULT - PROBLEM SELECTOR PLAN 6
and CREST syndrome  - c/w home plaquenil 200mg BID

## 2024-11-28 NOTE — PROGRESS NOTE ADULT - PROBLEM/PLAN-6
DISPLAY PLAN FREE TEXT
Duration Of Freeze Thaw-Cycle (Seconds): 0
Detail Level: Detailed
Consent: The patient's consent was obtained including but not limited to risks of crusting, scabbing, blistering, scarring, darker or lighter pigmentary change, recurrence, incomplete removal and infection.
Post-Care Instructions: I reviewed with the patient in detail post-care instructions. Patient is to wear sunprotection, and avoid picking at any of the treated lesions. Pt may apply Vaseline to crusted or scabbing areas.
Render Post-Care Instructions In Note?: no
DISPLAY PLAN FREE TEXT
Medical Necessity Clause: This procedure was medically necessary because the lesions that were treated were:
Medical Necessity Information: It is in your best interest to select a reason for this procedure from the list below. All of these items fulfill various CMS LCD requirements except the new and changing color options.
DISPLAY PLAN FREE TEXT
DISPLAY PLAN FREE TEXT

## 2024-11-28 NOTE — PROGRESS NOTE ADULT - SUBJECTIVE AND OBJECTIVE BOX
Julián Estrada MD  Division of Hospital Medicine  Available on [employer]    PROGRESS NOTE:     Patient is a 84y old  Female who presents with a chief complaint of chronic left osteomyelitis (27 Nov 2024 17:29)      SUBJECTIVE / OVERNIGHT EVENTS: Patient seen and examined. No acute overnight events. Feels well, s/p PICC.    MEDICATIONS  (STANDING):  amLODIPine   Tablet 5 milliGRAM(s) Oral daily  ascorbic acid 500 milliGRAM(s) Oral daily  atorvastatin 10 milliGRAM(s) Oral at bedtime  cefepime   IVPB 1000 milliGRAM(s) IV Intermittent every 8 hours  chlorhexidine 4% Liquid 1 Application(s) Topical <User Schedule>  cyanocobalamin 1000 MICROGram(s) Oral daily  ferrous    sulfate 325 milliGRAM(s) Oral daily  folic acid 1 milliGRAM(s) Oral daily  hydroxychloroquine 200 milliGRAM(s) Oral two times a day  levothyroxine 112 MICROGram(s) Oral daily  multivitamin 1 Tablet(s) Oral daily  pantoprazole    Tablet 40 milliGRAM(s) Oral before breakfast  polyethylene glycol 3350 17 Gram(s) Oral two times a day  senna 2 Tablet(s) Oral at bedtime    MEDICATIONS  (PRN):  HYDROmorphone  Injectable 0.5 milliGRAM(s) IV Push every 4 hours PRN Severe Pain (7 - 10)  oxyCODONE    IR 5 milliGRAM(s) Oral every 4 hours PRN Severe Pain (7 - 10)  sodium chloride 0.9% lock flush 10 milliLiter(s) IV Push every 1 hour PRN Pre/post blood products, medications, blood draw, and to maintain line patency      CAPILLARY BLOOD GLUCOSE        I&O's Summary    27 Nov 2024 07:01  -  28 Nov 2024 07:00  --------------------------------------------------------  IN: 920 mL / OUT: 1150 mL / NET: -230 mL    28 Nov 2024 07:01  -  28 Nov 2024 17:29  --------------------------------------------------------  IN: 200 mL / OUT: 0 mL / NET: 200 mL        PHYSICAL EXAM:  Vital Signs Last 24 Hrs  T(C): 36.6 (28 Nov 2024 17:04), Max: 36.8 (28 Nov 2024 05:21)  T(F): 97.9 (28 Nov 2024 17:04), Max: 98.3 (28 Nov 2024 13:48)  HR: 67 (28 Nov 2024 17:04) (67 - 84)  BP: 103/61 (28 Nov 2024 17:04) (98/61 - 109/71)  BP(mean): --  RR: 18 (28 Nov 2024 17:04) (18 - 18)  SpO2: 96% (28 Nov 2024 17:04) (93% - 99%)    Parameters below as of 28 Nov 2024 17:04  Patient On (Oxygen Delivery Method): room air    CONSTITUTIONAL: NAD, well-developed, well-groomed  RESPIRATORY: Normal respiratory effort; lungs are clear to auscultation bilaterally  CARDIOVASCULAR: Regular rate and rhythm, normal S1 and S2  ABDOMEN: Nontender to palpation  MUSCULOSKELETAL:  Left foot wrapped, amputation at metarsal, R BKA  PSYCH: A+O to person, place, and time;  LABS:                        9.1    7.16  )-----------( 179      ( 28 Nov 2024 09:14 )             29.0     11-28    138  |  106  |  19  ----------------------------<  85  4.1   |  19[L]  |  0.77    Ca    8.4      28 Nov 2024 09:14  Phos  3.2     11-27  Mg     1.9     11-27            Urinalysis Basic - ( 28 Nov 2024 09:14 )    Color: x / Appearance: x / SG: x / pH: x  Gluc: 85 mg/dL / Ketone: x  / Bili: x / Urobili: x   Blood: x / Protein: x / Nitrite: x   Leuk Esterase: x / RBC: x / WBC x   Sq Epi: x / Non Sq Epi: x / Bacteria: x          RADIOLOGY & ADDITIONAL TESTS:  Results Reviewed: Y  Imaging Personally Reviewed:Y  Electrocardiogram Personally Reviewed:Y    COORDINATION OF CARE:  Care Discussed with Consultants/Other Providers [Y/N]:Y  Prior or Outpatient Records Reviewed [Y/N]:Y

## 2024-11-28 NOTE — PROGRESS NOTE ADULT - PROBLEM SELECTOR PLAN 5
- c/w home levothyroxine 112mcg daily (recently reduced)

## 2024-11-29 ENCOUNTER — TRANSCRIPTION ENCOUNTER (OUTPATIENT)
Age: 84
End: 2024-11-29

## 2024-11-29 VITALS
HEART RATE: 79 BPM | SYSTOLIC BLOOD PRESSURE: 111 MMHG | TEMPERATURE: 98 F | OXYGEN SATURATION: 93 % | DIASTOLIC BLOOD PRESSURE: 66 MMHG | RESPIRATION RATE: 18 BRPM

## 2024-11-29 LAB
ALBUMIN SERPL ELPH-MCNC: 2.9 G/DL — LOW (ref 3.3–5)
ALP SERPL-CCNC: 192 U/L — HIGH (ref 40–120)
ALT FLD-CCNC: 7 U/L — LOW (ref 10–45)
ANION GAP SERPL CALC-SCNC: 11 MMOL/L — SIGNIFICANT CHANGE UP (ref 5–17)
AST SERPL-CCNC: 15 U/L — SIGNIFICANT CHANGE UP (ref 10–40)
BASOPHILS # BLD AUTO: 0.03 K/UL — SIGNIFICANT CHANGE UP (ref 0–0.2)
BASOPHILS NFR BLD AUTO: 0.4 % — SIGNIFICANT CHANGE UP (ref 0–2)
BILIRUB SERPL-MCNC: 0.2 MG/DL — SIGNIFICANT CHANGE UP (ref 0.2–1.2)
BUN SERPL-MCNC: 24 MG/DL — HIGH (ref 7–23)
CALCIUM SERPL-MCNC: 8.4 MG/DL — SIGNIFICANT CHANGE UP (ref 8.4–10.5)
CHLORIDE SERPL-SCNC: 104 MMOL/L — SIGNIFICANT CHANGE UP (ref 96–108)
CO2 SERPL-SCNC: 20 MMOL/L — LOW (ref 22–31)
CREAT SERPL-MCNC: 0.85 MG/DL — SIGNIFICANT CHANGE UP (ref 0.5–1.3)
EGFR: 68 ML/MIN/1.73M2 — SIGNIFICANT CHANGE UP
EOSINOPHIL # BLD AUTO: 0.31 K/UL — SIGNIFICANT CHANGE UP (ref 0–0.5)
EOSINOPHIL NFR BLD AUTO: 4.5 % — SIGNIFICANT CHANGE UP (ref 0–6)
GLUCOSE SERPL-MCNC: 75 MG/DL — SIGNIFICANT CHANGE UP (ref 70–99)
HCT VFR BLD CALC: 27.9 % — LOW (ref 34.5–45)
HGB BLD-MCNC: 8.6 G/DL — LOW (ref 11.5–15.5)
IMM GRANULOCYTES NFR BLD AUTO: 0.4 % — SIGNIFICANT CHANGE UP (ref 0–0.9)
LYMPHOCYTES # BLD AUTO: 0.81 K/UL — LOW (ref 1–3.3)
LYMPHOCYTES # BLD AUTO: 11.6 % — LOW (ref 13–44)
MAGNESIUM SERPL-MCNC: 2 MG/DL — SIGNIFICANT CHANGE UP (ref 1.6–2.6)
MCHC RBC-ENTMCNC: 29.3 PG — SIGNIFICANT CHANGE UP (ref 27–34)
MCHC RBC-ENTMCNC: 30.8 G/DL — LOW (ref 32–36)
MCV RBC AUTO: 94.9 FL — SIGNIFICANT CHANGE UP (ref 80–100)
MONOCYTES # BLD AUTO: 0.92 K/UL — HIGH (ref 0–0.9)
MONOCYTES NFR BLD AUTO: 13.2 % — SIGNIFICANT CHANGE UP (ref 2–14)
NEUTROPHILS # BLD AUTO: 4.86 K/UL — SIGNIFICANT CHANGE UP (ref 1.8–7.4)
NEUTROPHILS NFR BLD AUTO: 69.9 % — SIGNIFICANT CHANGE UP (ref 43–77)
NRBC # BLD: 0 /100 WBCS — SIGNIFICANT CHANGE UP (ref 0–0)
PHOSPHATE SERPL-MCNC: 2.7 MG/DL — SIGNIFICANT CHANGE UP (ref 2.5–4.5)
PLATELET # BLD AUTO: 187 K/UL — SIGNIFICANT CHANGE UP (ref 150–400)
POTASSIUM SERPL-MCNC: 3.9 MMOL/L — SIGNIFICANT CHANGE UP (ref 3.5–5.3)
POTASSIUM SERPL-SCNC: 3.9 MMOL/L — SIGNIFICANT CHANGE UP (ref 3.5–5.3)
PROT SERPL-MCNC: 5.5 G/DL — LOW (ref 6–8.3)
RBC # BLD: 2.94 M/UL — LOW (ref 3.8–5.2)
RBC # FLD: 15.4 % — HIGH (ref 10.3–14.5)
SODIUM SERPL-SCNC: 135 MMOL/L — SIGNIFICANT CHANGE UP (ref 135–145)
WBC # BLD: 6.96 K/UL — SIGNIFICANT CHANGE UP (ref 3.8–10.5)
WBC # FLD AUTO: 6.96 K/UL — SIGNIFICANT CHANGE UP (ref 3.8–10.5)

## 2024-11-29 PROCEDURE — 87075 CULTR BACTERIA EXCEPT BLOOD: CPT

## 2024-11-29 PROCEDURE — 87070 CULTURE OTHR SPECIMN AEROBIC: CPT

## 2024-11-29 PROCEDURE — 97116 GAIT TRAINING THERAPY: CPT

## 2024-11-29 PROCEDURE — 83036 HEMOGLOBIN GLYCOSYLATED A1C: CPT

## 2024-11-29 PROCEDURE — 85027 COMPLETE CBC AUTOMATED: CPT

## 2024-11-29 PROCEDURE — 87186 SC STD MICRODIL/AGAR DIL: CPT

## 2024-11-29 PROCEDURE — 97110 THERAPEUTIC EXERCISES: CPT

## 2024-11-29 PROCEDURE — 99232 SBSQ HOSP IP/OBS MODERATE 35: CPT

## 2024-11-29 PROCEDURE — 84100 ASSAY OF PHOSPHORUS: CPT

## 2024-11-29 PROCEDURE — 73630 X-RAY EXAM OF FOOT: CPT

## 2024-11-29 PROCEDURE — 36415 COLL VENOUS BLD VENIPUNCTURE: CPT

## 2024-11-29 PROCEDURE — 88305 TISSUE EXAM BY PATHOLOGIST: CPT

## 2024-11-29 PROCEDURE — 97161 PT EVAL LOW COMPLEX 20 MIN: CPT

## 2024-11-29 PROCEDURE — 80048 BASIC METABOLIC PNL TOTAL CA: CPT

## 2024-11-29 PROCEDURE — 87077 CULTURE AEROBIC IDENTIFY: CPT

## 2024-11-29 PROCEDURE — 99239 HOSP IP/OBS DSCHRG MGMT >30: CPT

## 2024-11-29 PROCEDURE — 93923 UPR/LXTR ART STDY 3+ LVLS: CPT

## 2024-11-29 PROCEDURE — 36569 INSJ PICC 5 YR+ W/O IMAGING: CPT

## 2024-11-29 PROCEDURE — 97530 THERAPEUTIC ACTIVITIES: CPT

## 2024-11-29 PROCEDURE — 71045 X-RAY EXAM CHEST 1 VIEW: CPT

## 2024-11-29 PROCEDURE — 85025 COMPLETE CBC W/AUTO DIFF WBC: CPT

## 2024-11-29 PROCEDURE — C1751: CPT

## 2024-11-29 PROCEDURE — 88311 DECALCIFY TISSUE: CPT

## 2024-11-29 PROCEDURE — 80053 COMPREHEN METABOLIC PANEL: CPT

## 2024-11-29 PROCEDURE — 83735 ASSAY OF MAGNESIUM: CPT

## 2024-11-29 RX ORDER — OXYCODONE HYDROCHLORIDE 30 MG/1
1 TABLET ORAL
Qty: 30 | Refills: 0
Start: 2024-11-29 | End: 2024-12-03

## 2024-11-29 RX ORDER — MULTIVIT WITH MINERALS/LUTEIN
1 TABLET ORAL
Qty: 30 | Refills: 0
Start: 2024-11-29 | End: 2024-12-28

## 2024-11-29 RX ORDER — POLYETHYLENE GLYCOL 3350 17 G/17G
17 POWDER, FOR SOLUTION ORAL
Qty: 510 | Refills: 0
Start: 2024-11-29 | End: 2024-12-13

## 2024-11-29 RX ORDER — FERROUS SULFATE 325(65) MG
1 TABLET ORAL
Qty: 30 | Refills: 0
Start: 2024-11-29 | End: 2024-12-28

## 2024-11-29 RX ORDER — SENNOSIDES 8.6 MG
2 TABLET ORAL
Qty: 60 | Refills: 0
Start: 2024-11-29 | End: 2024-12-28

## 2024-11-29 RX ADMIN — CEFEPIME 100 MILLIGRAM(S): 2 INJECTION, POWDER, FOR SOLUTION INTRAVENOUS at 05:04

## 2024-11-29 RX ADMIN — HYDROXYCHLOROQUINE SULFATE 200 MILLIGRAM(S): 200 TABLET, FILM COATED ORAL at 05:05

## 2024-11-29 RX ADMIN — OXYCODONE HYDROCHLORIDE 5 MILLIGRAM(S): 30 TABLET ORAL at 02:54

## 2024-11-29 RX ADMIN — OXYCODONE HYDROCHLORIDE 5 MILLIGRAM(S): 30 TABLET ORAL at 02:24

## 2024-11-29 RX ADMIN — Medication 1 MILLIGRAM(S): at 11:59

## 2024-11-29 RX ADMIN — Medication 500 MILLIGRAM(S): at 12:00

## 2024-11-29 RX ADMIN — OXYCODONE HYDROCHLORIDE 5 MILLIGRAM(S): 30 TABLET ORAL at 12:45

## 2024-11-29 RX ADMIN — AMLODIPINE BESYLATE 5 MILLIGRAM(S): 10 TABLET ORAL at 05:05

## 2024-11-29 RX ADMIN — Medication 112 MICROGRAM(S): at 05:04

## 2024-11-29 RX ADMIN — OXYCODONE HYDROCHLORIDE 5 MILLIGRAM(S): 30 TABLET ORAL at 11:59

## 2024-11-29 RX ADMIN — CHLORHEXIDINE GLUCONATE 1 APPLICATION(S): 1.2 RINSE ORAL at 05:04

## 2024-11-29 RX ADMIN — Medication 1000 MICROGRAM(S): at 11:59

## 2024-11-29 RX ADMIN — CEFEPIME 100 MILLIGRAM(S): 2 INJECTION, POWDER, FOR SOLUTION INTRAVENOUS at 12:25

## 2024-11-29 RX ADMIN — Medication 1 TABLET(S): at 11:59

## 2024-11-29 RX ADMIN — PANTOPRAZOLE SODIUM 40 MILLIGRAM(S): 40 TABLET, DELAYED RELEASE ORAL at 05:05

## 2024-11-29 RX ADMIN — Medication 325 MILLIGRAM(S): at 11:59

## 2024-11-29 NOTE — DISCHARGE NOTE NURSING/CASE MANAGEMENT/SOCIAL WORK - PATIENT PORTAL LINK FT
You can access the FollowMyHealth Patient Portal offered by [facility] by registering at the following website: [email or web address]. By joining [facility]’s FollowMyHealth portal, you will also be able to view your health information using other applications (apps) compatible with our system.

## 2024-11-29 NOTE — PROGRESS NOTE ADULT - ASSESSMENT
84F s/p left foot transmetatarsal amputation w/ tendoachilles tendon lengthening, closed (DOS 11/22)   - Patient seen and evaluated  - Afebrile, no leukocytosis   - 11/22 s/p left foot transmetatarsal amputation with tendoachilles lengthening, closed: sutures and staples intact, moderate sanguinous drainage, flaps cold with ischemic changes. Wound dehisensce of medial dorsal incision down to subQ, no acute signs of infection   - Intraop findings: low concern for residual infection/ high concern for viability   - Clean bone margin: Serratia marcescens Step agalactiae, pseudomonas   - OR wound culture: Serratia marsecens, Step agalactiae, pseudomonas   - Vasc recs, appreciated   - Id recs, appreciated. No further podiatric surgical intervention planned at this time.   - Pod stable for discharge pending final vasc recs/ final ID recs   - Follow up information in discharge note provider   - Discussed with attending

## 2024-11-29 NOTE — DISCHARGE NOTE NURSING/CASE MANAGEMENT/SOCIAL WORK - NSDCFUADDAPPT_GEN_ALL_CORE_FT
APPTS ARE READY TO BE MADE: [x ] YES    Best Family or Patient Contact (if needed):    Additional Information about above appointments (if needed):    1:   2:   3:   Podiatry Discharge Instructions:  Follow up: Please follow up with Dr. Hope within 1 week of discharge from the hospital, please call [phone] for appointment and discuss that you recently were seen in the hospital.  Wound Care: Please leave your dressing clean dry intact until your follow up appointment.  Weight bearing: Please weight bear as tolerated in a surgical shoe.  Antibiotics: Please continue as instructed.

## 2024-11-29 NOTE — PROGRESS NOTE ADULT - PROVIDER SPECIALTY LIST ADULT
Podiatry
Vascular Surgery
Infectious Disease
Infectious Disease
Vascular Surgery
Infectious Disease
Podiatry
Infectious Disease
Internal Medicine
Hospitalist
Internal Medicine
Internal Medicine
Hospitalist
Hospitalist

## 2024-11-29 NOTE — PROGRESS NOTE ADULT - ASSESSMENT
84-year-old female with a past medical history of Raynaud's disease, crest syndrome, peripheral vascular disease status post right BKA, atrial fibrillation not on anticoagulation due to GI bleed, chronic left toe wound, amputation of the left second digit who was admitted to the hospital due to a previously scheduled left foot metatarsal amputation with Achilles lengthening.    Patient with no systemic or localizing symptoms on admission.  Underwent left foot metatarsal amputation and Achilles lengthening on 11/22/2024.  Low concern for residual bone infection, high concern for viability.    Patient admitted for further management postoperatively.  ID now consulted due to positive clean bone margin culture growing Serratia, strep agalactiae, Pseudomonas.    Since admission, patient has been afebrile.  Otherwise hemodynamically stable on room air.  Latest labs show no leukocytosis, anemia 9.3/30.4, BMP with renal function within GFR 54.    #Left foot wound status post transmetatarsal amputation with Achilles lengthening on 11/22/2024  #History of crest syndrome/Raynaud's on hydroxychloroquine  #PVD    Recommendations  Patient with postoperative clean margin cultures positive  Polymicrobial growth in cultures  Serratia and strep and clean margins  Serratia, strep and pseudomonas growth on swab culture initally  pathology is pending  OR culture may be contaminated therefore would await pathology report  If pathology negative for ostoemyelitis then clean margin culture likely contamination.   Tentatively start 6 wks of cefepime 1g IV Q8H counting from the date of surgery (end 1/3/25).  To review path as outpatient and decide whether or not to continue x6 wks vs discontinuing.  Discussed with daughter re: medication side effects, PICC line adverse effects, and contingency plan.  Please send weekly CBC, CMP, ESR, and CRP to [email or web address]  Please call [phone] to set up a follow-up appointment within 1-2 weeks of discharge.  Follow fever curve and WBC count    Braynt Sandoval MD  Attending Physician  Division of Infectious Diseases  Department of Medicine    Please contact through MS Teams message.  Office: [phone] (after 5 PM or weekend)

## 2024-11-29 NOTE — PROGRESS NOTE ADULT - REASON FOR ADMISSION
chronic left osteomyelitis

## 2024-11-29 NOTE — PROGRESS NOTE ADULT - SUBJECTIVE AND OBJECTIVE BOX
Patient is a 84y old  Female who presents with a chief complaint of chronic left osteomyelitis (28 Nov 2024 17:29)       INTERVAL HPI/OVERNIGHT EVENTS:  Patient seen and evaluated at bedside.  Pt is resting comfortable in NAD. Denies N/V/F/C.    Allergies    No Known Allergies    Intolerances        Vital Signs Last 24 Hrs  T(C): 36.7 (29 Nov 2024 05:01), Max: 37 (28 Nov 2024 21:40)  T(F): 98 (29 Nov 2024 05:01), Max: 98.6 (28 Nov 2024 21:40)  HR: 69 (29 Nov 2024 05:01) (67 - 105)  BP: 112/66 (29 Nov 2024 05:01) (98/61 - 113/70)  BP(mean): --  RR: 18 (29 Nov 2024 05:01) (18 - 18)  SpO2: 94% (29 Nov 2024 05:01) (93% - 96%)    Parameters below as of 29 Nov 2024 05:01  Patient On (Oxygen Delivery Method): room air        LABS:                        8.6    6.96  )-----------( 187      ( 29 Nov 2024 06:53 )             27.9     11-29    135  |  104  |  24[H]  ----------------------------<  75  3.9   |  20[L]  |  0.85    Ca    8.4      29 Nov 2024 06:49  Phos  2.7     11-29  Mg     2.0     11-29    TPro  5.5[L]  /  Alb  2.9[L]  /  TBili  0.2  /  DBili  x   /  AST  15  /  ALT  7[L]  /  AlkPhos  192[H]  11-29      Urinalysis Basic - ( 29 Nov 2024 06:49 )    Color: x / Appearance: x / SG: x / pH: x  Gluc: 75 mg/dL / Ketone: x  / Bili: x / Urobili: x   Blood: x / Protein: x / Nitrite: x   Leuk Esterase: x / RBC: x / WBC x   Sq Epi: x / Non Sq Epi: x / Bacteria: x      CAPILLARY BLOOD GLUCOSE          Lower Extremity Physical Exam:  Vascular: DP/PT 0/4 B/L, CFT <3sec x 10, Temp gradient warm to cool B/L  Neurology: Epicritic sensation diminished to level of digits, B/L  Musculoskeletal/Ortho: unremarkable   Skin: 11/22 s/p left foot transmetatarsal amputation with tendoachilles lengthening, closed: sutures and staples intact, moderate sanguinous drainage, flaps cold with ischemic changes. Wound dehisensce of medial dorsal incision down to subQ, no acute signs of infection       RADIOLOGY & ADDITIONAL TESTS:

## 2024-11-29 NOTE — PROGRESS NOTE ADULT - SUBJECTIVE AND OBJECTIVE BOX
Follow Up:  foot wound    Interval History/ROS:  Overnight: No acute events.  Patient remains afebrile.  Otherwise hemodynamically stable.  Allergies  No Known Allergies        ANTIMICROBIALS:  cefepime   IVPB 1000 every 8 hours  hydroxychloroquine 200 two times a day      OTHER MEDS:  MEDICATIONS  (STANDING):  amLODIPine   Tablet 5 daily  atorvastatin 10 at bedtime  HYDROmorphone  Injectable 0.5 every 4 hours PRN  levothyroxine 112 daily  oxyCODONE    IR 5 every 4 hours PRN  pantoprazole    Tablet 40 before breakfast  polyethylene glycol 3350 17 two times a day  senna 2 at bedtime      Vital Signs Last 24 Hrs  T(C): 36.9 (29 Nov 2024 13:33), Max: 37.1 (29 Nov 2024 09:13)  T(F): 98.5 (29 Nov 2024 13:33), Max: 98.7 (29 Nov 2024 09:13)  HR: 79 (29 Nov 2024 13:33) (69 - 105)  BP: 111/66 (29 Nov 2024 13:33) (97/61 - 113/70)  BP(mean): --  RR: 18 (29 Nov 2024 13:33) (18 - 18)  SpO2: 93% (29 Nov 2024 13:33) (93% - 94%)    Parameters below as of 29 Nov 2024 13:33  Patient On (Oxygen Delivery Method): room air        PHYSICAL EXAM:  Constitutional: non-toxic, no distress  Cardiovascular:   normal S1, S2, no murmur, RRR  Respiratory:  clear BS bilaterally, no wheezes, no rales  GI:  soft, non-tender, normal bowel sounds  Musculoskeletal:  R old BKA; L foot s/p TMA, surgical site w/ bleeding, no purulence  Neurologic: awake and alert, [place]                                  8.6    6.96  )-----------( 187      ( 29 Nov 2024 06:53 )             27.9       11-29    135  |  104  |  24[H]  ----------------------------<  75  3.9   |  20[L]  |  0.85    Ca    8.4      29 Nov 2024 06:49  Phos  2.7     11-29  Mg     2.0     11-29    TPro  5.5[L]  /  Alb  2.9[L]  /  TBili  0.2  /  DBili  x   /  AST  15  /  ALT  7[L]  /  AlkPhos  192[H]  11-29      Urinalysis Basic - ( 29 Nov 2024 06:49 )    Color: x / Appearance: x / SG: x / pH: x  Gluc: 75 mg/dL / Ketone: x  / Bili: x / Urobili: x   Blood: x / Protein: x / Nitrite: x   Leuk Esterase: x / RBC: x / WBC x   Sq Epi: x / Non Sq Epi: x / Bacteria: x        MICROBIOLOGY:  v                  RADIOLOGY:  Imaging reviewed

## 2024-12-02 ENCOUNTER — OUTPATIENT (OUTPATIENT)
Dept: OUTPATIENT SERVICES | Facility: HOSPITAL | Age: 84
LOS: 1 days | End: 2024-12-02
Payer: MEDICARE

## 2024-12-02 ENCOUNTER — APPOINTMENT (OUTPATIENT)
Dept: WOUND CARE | Facility: HOSPITAL | Age: 84
End: 2024-12-02
Payer: MEDICARE

## 2024-12-02 DIAGNOSIS — Z98.890 OTHER SPECIFIED POSTPROCEDURAL STATES: Chronic | ICD-10-CM

## 2024-12-02 DIAGNOSIS — Z89.421 ACQUIRED ABSENCE OF OTHER RIGHT TOE(S): Chronic | ICD-10-CM

## 2024-12-02 DIAGNOSIS — Z89.511 ACQUIRED ABSENCE OF RIGHT LEG BELOW KNEE: Chronic | ICD-10-CM

## 2024-12-02 PROBLEM — Z89.439 STATUS POST TRANSMETATARSAL AMPUTATION OF FOOT: Status: ACTIVE | Noted: 2024-12-02

## 2024-12-02 PROCEDURE — G0463: CPT

## 2024-12-02 PROCEDURE — 99213 OFFICE O/P EST LOW 20 MIN: CPT | Mod: 24

## 2024-12-05 ENCOUNTER — NON-APPOINTMENT (OUTPATIENT)
Age: 84
End: 2024-12-05

## 2024-12-09 ENCOUNTER — APPOINTMENT (OUTPATIENT)
Dept: WOUND CARE | Facility: HOSPITAL | Age: 84
End: 2024-12-09
Payer: MEDICARE

## 2024-12-09 PROBLEM — T86.828 SKIN FLAP NECROSIS: Status: ACTIVE | Noted: 2024-12-09

## 2024-12-09 PROCEDURE — 99213 OFFICE O/P EST LOW 20 MIN: CPT

## 2024-12-09 RX ORDER — OXYCODONE AND ACETAMINOPHEN 5; 325 MG/1; MG/1
5-325 TABLET ORAL
Qty: 20 | Refills: 0 | Status: ACTIVE | COMMUNITY
Start: 2024-12-09 | End: 1900-01-01

## 2024-12-16 ENCOUNTER — APPOINTMENT (OUTPATIENT)
Dept: WOUND CARE | Facility: HOSPITAL | Age: 84
End: 2024-12-16

## 2024-12-16 ENCOUNTER — OUTPATIENT (OUTPATIENT)
Dept: OUTPATIENT SERVICES | Facility: HOSPITAL | Age: 84
LOS: 1 days | End: 2024-12-16
Payer: MEDICARE

## 2024-12-16 DIAGNOSIS — Z98.890 OTHER SPECIFIED POSTPROCEDURAL STATES: Chronic | ICD-10-CM

## 2024-12-16 DIAGNOSIS — I96 OTHER COMPLICATIONS OF SKIN GRAFT (ALLOGRAFT) (AUTOGRAFT): ICD-10-CM

## 2024-12-16 DIAGNOSIS — Z89.511 ACQUIRED ABSENCE OF RIGHT LEG BELOW KNEE: Chronic | ICD-10-CM

## 2024-12-16 DIAGNOSIS — Z89.421 ACQUIRED ABSENCE OF OTHER RIGHT TOE(S): Chronic | ICD-10-CM

## 2024-12-16 DIAGNOSIS — M34.1 CR(E)ST SYNDROME: ICD-10-CM

## 2024-12-16 DIAGNOSIS — Z89.439 ACQUIRED ABSENCE OF UNSPECIFIED FOOT: ICD-10-CM

## 2024-12-16 DIAGNOSIS — I73.9 PERIPHERAL VASCULAR DISEASE, UNSPECIFIED: ICD-10-CM

## 2024-12-16 DIAGNOSIS — T86.828 OTHER COMPLICATIONS OF SKIN GRAFT (ALLOGRAFT) (AUTOGRAFT): ICD-10-CM

## 2024-12-16 PROCEDURE — 11042 DBRDMT SUBQ TIS 1ST 20SQCM/<: CPT | Mod: 79

## 2024-12-16 PROCEDURE — 11042 DBRDMT SUBQ TIS 1ST 20SQCM/<: CPT

## 2024-12-17 DIAGNOSIS — Z89.439 ACQUIRED ABSENCE OF UNSPECIFIED FOOT: ICD-10-CM

## 2024-12-30 ENCOUNTER — APPOINTMENT (OUTPATIENT)
Dept: WOUND CARE | Facility: HOSPITAL | Age: 84
End: 2024-12-30

## 2024-12-30 VITALS
RESPIRATION RATE: 18 BRPM | HEART RATE: 75 BPM | SYSTOLIC BLOOD PRESSURE: 122 MMHG | DIASTOLIC BLOOD PRESSURE: 61 MMHG | OXYGEN SATURATION: 92 % | TEMPERATURE: 98.1 F

## 2024-12-30 DIAGNOSIS — T86.828 OTHER COMPLICATIONS OF SKIN GRAFT (ALLOGRAFT) (AUTOGRAFT): ICD-10-CM

## 2024-12-30 DIAGNOSIS — S88.111D COMPLETE TRAUMATIC AMPUTATION AT LVL BETWEEN KNEE AND ANKLE, RIGHT LOWER LEG, SUBSEQUENT ENCOUNTER: ICD-10-CM

## 2024-12-30 DIAGNOSIS — L97.522 NON-PRESSURE CHRONIC ULCER OF OTHER PART OF LEFT FOOT WITH FAT LAYER EXPOSED: ICD-10-CM

## 2024-12-30 DIAGNOSIS — I73.9 PERIPHERAL VASCULAR DISEASE, UNSPECIFIED: ICD-10-CM

## 2024-12-30 DIAGNOSIS — Z89.439 ACQUIRED ABSENCE OF UNSPECIFIED FOOT: ICD-10-CM

## 2024-12-30 DIAGNOSIS — I96 OTHER COMPLICATIONS OF SKIN GRAFT (ALLOGRAFT) (AUTOGRAFT): ICD-10-CM

## 2024-12-30 DIAGNOSIS — M34.1 CR(E)ST SYNDROME: ICD-10-CM

## 2024-12-30 PROCEDURE — 15271 SKIN SUB GRAFT TRNK/ARM/LEG: CPT

## 2025-01-02 DIAGNOSIS — I73.9 PERIPHERAL VASCULAR DISEASE, UNSPECIFIED: ICD-10-CM

## 2025-01-09 ENCOUNTER — APPOINTMENT (OUTPATIENT)
Dept: INFECTIOUS DISEASE | Facility: CLINIC | Age: 85
End: 2025-01-09

## 2025-01-13 ENCOUNTER — APPOINTMENT (OUTPATIENT)
Dept: WOUND CARE | Facility: HOSPITAL | Age: 85
End: 2025-01-13

## 2025-01-13 DIAGNOSIS — L97.522 NON-PRESSURE CHRONIC ULCER OF OTHER PART OF LEFT FOOT WITH FAT LAYER EXPOSED: ICD-10-CM

## 2025-01-13 DIAGNOSIS — M34.1 CR(E)ST SYNDROME: ICD-10-CM

## 2025-01-13 DIAGNOSIS — Z89.439 ACQUIRED ABSENCE OF UNSPECIFIED FOOT: ICD-10-CM

## 2025-01-13 PROCEDURE — 11042 DBRDMT SUBQ TIS 1ST 20SQCM/<: CPT | Mod: 79

## 2025-01-13 RX ORDER — CIPROFLOXACIN HYDROCHLORIDE 500 MG/1
500 TABLET, FILM COATED ORAL
Qty: 20 | Refills: 0 | Status: ACTIVE | COMMUNITY
Start: 2025-01-13 | End: 1900-01-01

## 2025-01-18 LAB — CULTURE, WOUND AEROBE ANAEROBE: ABNORMAL

## 2025-01-27 ENCOUNTER — APPOINTMENT (OUTPATIENT)
Dept: WOUND CARE | Facility: HOSPITAL | Age: 85
End: 2025-01-27

## 2025-01-27 DIAGNOSIS — L97.522 NON-PRESSURE CHRONIC ULCER OF OTHER PART OF LEFT FOOT WITH FAT LAYER EXPOSED: ICD-10-CM

## 2025-01-27 DIAGNOSIS — Z89.439 ACQUIRED ABSENCE OF UNSPECIFIED FOOT: ICD-10-CM

## 2025-01-27 DIAGNOSIS — M34.1 CR(E)ST SYNDROME: ICD-10-CM

## 2025-01-27 DIAGNOSIS — I73.9 PERIPHERAL VASCULAR DISEASE, UNSPECIFIED: ICD-10-CM

## 2025-01-27 PROCEDURE — 11042 DBRDMT SUBQ TIS 1ST 20SQCM/<: CPT | Mod: 79

## 2025-01-27 RX ORDER — AMOXICILLIN AND CLAVULANATE POTASSIUM 500; 125 MG/1; MG/1
500-125 TABLET, FILM COATED ORAL TWICE DAILY
Qty: 28 | Refills: 0 | Status: ACTIVE | COMMUNITY
Start: 2025-01-27 | End: 1900-01-01

## 2025-01-27 RX ORDER — SULFAMETHOXAZOLE AND TRIMETHOPRIM 800; 160 MG/1; MG/1
800-160 TABLET ORAL TWICE DAILY
Qty: 28 | Refills: 0 | Status: ACTIVE | COMMUNITY
Start: 2025-01-27 | End: 1900-01-01

## 2025-02-10 ENCOUNTER — APPOINTMENT (OUTPATIENT)
Dept: WOUND CARE | Facility: HOSPITAL | Age: 85
End: 2025-02-10

## 2025-02-10 DIAGNOSIS — M34.1 CR(E)ST SYNDROME: ICD-10-CM

## 2025-02-10 DIAGNOSIS — L97.522 NON-PRESSURE CHRONIC ULCER OF OTHER PART OF LEFT FOOT WITH FAT LAYER EXPOSED: ICD-10-CM

## 2025-02-10 DIAGNOSIS — I73.9 PERIPHERAL VASCULAR DISEASE, UNSPECIFIED: ICD-10-CM

## 2025-02-10 DIAGNOSIS — Z89.439 ACQUIRED ABSENCE OF UNSPECIFIED FOOT: ICD-10-CM

## 2025-02-10 PROCEDURE — 11042 DBRDMT SUBQ TIS 1ST 20SQCM/<: CPT

## 2025-02-20 RX ORDER — COLLAGENASE SANTYL 250 [ARB'U]/G
250 OINTMENT TOPICAL DAILY
Qty: 1 | Refills: 3 | Status: DISCONTINUED | COMMUNITY
Start: 2025-02-18 | End: 2025-02-20

## 2025-02-24 ENCOUNTER — OUTPATIENT (OUTPATIENT)
Dept: OUTPATIENT SERVICES | Facility: HOSPITAL | Age: 85
LOS: 1 days | End: 2025-02-24
Payer: MEDICARE

## 2025-02-24 ENCOUNTER — APPOINTMENT (OUTPATIENT)
Dept: WOUND CARE | Facility: HOSPITAL | Age: 85
End: 2025-02-24
Payer: MEDICARE

## 2025-02-24 DIAGNOSIS — Z98.890 OTHER SPECIFIED POSTPROCEDURAL STATES: Chronic | ICD-10-CM

## 2025-02-24 DIAGNOSIS — Z89.421 ACQUIRED ABSENCE OF OTHER RIGHT TOE(S): Chronic | ICD-10-CM

## 2025-02-24 DIAGNOSIS — L97.522 NON-PRESSURE CHRONIC ULCER OF OTHER PART OF LEFT FOOT WITH FAT LAYER EXPOSED: ICD-10-CM

## 2025-02-24 DIAGNOSIS — Z89.511 ACQUIRED ABSENCE OF RIGHT LEG BELOW KNEE: Chronic | ICD-10-CM

## 2025-02-24 DIAGNOSIS — I73.9 PERIPHERAL VASCULAR DISEASE, UNSPECIFIED: ICD-10-CM

## 2025-02-24 DIAGNOSIS — Z89.439 ACQUIRED ABSENCE OF UNSPECIFIED FOOT: ICD-10-CM

## 2025-02-24 DIAGNOSIS — M34.1 CR(E)ST SYNDROME: ICD-10-CM

## 2025-02-24 PROCEDURE — 11042 DBRDMT SUBQ TIS 1ST 20SQCM/<: CPT

## 2025-03-04 DIAGNOSIS — I73.9 PERIPHERAL VASCULAR DISEASE, UNSPECIFIED: ICD-10-CM

## 2025-03-10 ENCOUNTER — OUTPATIENT (OUTPATIENT)
Dept: OUTPATIENT SERVICES | Facility: HOSPITAL | Age: 85
LOS: 1 days | End: 2025-03-10
Payer: MEDICARE

## 2025-03-10 ENCOUNTER — APPOINTMENT (OUTPATIENT)
Dept: WOUND CARE | Facility: HOSPITAL | Age: 85
End: 2025-03-10
Payer: MEDICARE

## 2025-03-10 DIAGNOSIS — L97.522 NON-PRESSURE CHRONIC ULCER OF OTHER PART OF LEFT FOOT WITH FAT LAYER EXPOSED: ICD-10-CM

## 2025-03-10 DIAGNOSIS — I73.9 PERIPHERAL VASCULAR DISEASE, UNSPECIFIED: ICD-10-CM

## 2025-03-10 DIAGNOSIS — Z89.421 ACQUIRED ABSENCE OF OTHER RIGHT TOE(S): Chronic | ICD-10-CM

## 2025-03-10 DIAGNOSIS — M34.1 CR(E)ST SYNDROME: ICD-10-CM

## 2025-03-10 DIAGNOSIS — Z98.890 OTHER SPECIFIED POSTPROCEDURAL STATES: Chronic | ICD-10-CM

## 2025-03-10 DIAGNOSIS — Z89.439 ACQUIRED ABSENCE OF UNSPECIFIED FOOT: ICD-10-CM

## 2025-03-10 PROCEDURE — 11042 DBRDMT SUBQ TIS 1ST 20SQCM/<: CPT

## 2025-03-24 ENCOUNTER — OUTPATIENT (OUTPATIENT)
Dept: OUTPATIENT SERVICES | Facility: HOSPITAL | Age: 85
LOS: 1 days | End: 2025-03-24
Payer: MEDICARE

## 2025-03-24 ENCOUNTER — APPOINTMENT (OUTPATIENT)
Dept: WOUND CARE | Facility: HOSPITAL | Age: 85
End: 2025-03-24
Payer: MEDICARE

## 2025-03-24 DIAGNOSIS — Z98.890 OTHER SPECIFIED POSTPROCEDURAL STATES: Chronic | ICD-10-CM

## 2025-03-24 PROCEDURE — 11042 DBRDMT SUBQ TIS 1ST 20SQCM/<: CPT | Mod: 58

## 2025-03-24 PROCEDURE — 15271 SKIN SUB GRAFT TRNK/ARM/LEG: CPT

## 2025-03-31 ENCOUNTER — OUTPATIENT (OUTPATIENT)
Dept: OUTPATIENT SERVICES | Facility: HOSPITAL | Age: 85
LOS: 1 days | End: 2025-03-31
Payer: MEDICARE

## 2025-03-31 ENCOUNTER — APPOINTMENT (OUTPATIENT)
Dept: WOUND CARE | Facility: HOSPITAL | Age: 85
End: 2025-03-31
Payer: MEDICARE

## 2025-03-31 DIAGNOSIS — Z89.421 ACQUIRED ABSENCE OF OTHER RIGHT TOE(S): Chronic | ICD-10-CM

## 2025-03-31 DIAGNOSIS — Z89.511 ACQUIRED ABSENCE OF RIGHT LEG BELOW KNEE: Chronic | ICD-10-CM

## 2025-03-31 PROCEDURE — G0463: CPT

## 2025-03-31 PROCEDURE — 99213 OFFICE O/P EST LOW 20 MIN: CPT

## 2025-04-07 ENCOUNTER — OUTPATIENT (OUTPATIENT)
Dept: OUTPATIENT SERVICES | Facility: HOSPITAL | Age: 85
LOS: 1 days | End: 2025-04-07
Payer: MEDICARE

## 2025-04-07 ENCOUNTER — APPOINTMENT (OUTPATIENT)
Dept: WOUND CARE | Facility: HOSPITAL | Age: 85
End: 2025-04-07
Payer: MEDICARE

## 2025-04-07 VITALS
HEART RATE: 68 BPM | DIASTOLIC BLOOD PRESSURE: 73 MMHG | RESPIRATION RATE: 18 BRPM | SYSTOLIC BLOOD PRESSURE: 137 MMHG | TEMPERATURE: 98 F

## 2025-04-07 DIAGNOSIS — Z89.421 ACQUIRED ABSENCE OF OTHER RIGHT TOE(S): Chronic | ICD-10-CM

## 2025-04-07 DIAGNOSIS — Z98.890 OTHER SPECIFIED POSTPROCEDURAL STATES: Chronic | ICD-10-CM

## 2025-04-07 DIAGNOSIS — M34.1 CR(E)ST SYNDROME: ICD-10-CM

## 2025-04-07 DIAGNOSIS — Z89.511 ACQUIRED ABSENCE OF RIGHT LEG BELOW KNEE: Chronic | ICD-10-CM

## 2025-04-07 PROCEDURE — 11042 DBRDMT SUBQ TIS 1ST 20SQCM/<: CPT | Mod: 58

## 2025-04-07 PROCEDURE — 15271 SKIN SUB GRAFT TRNK/ARM/LEG: CPT

## 2025-04-14 ENCOUNTER — APPOINTMENT (OUTPATIENT)
Dept: WOUND CARE | Facility: HOSPITAL | Age: 85
End: 2025-04-14
Payer: MEDICARE

## 2025-04-14 ENCOUNTER — OUTPATIENT (OUTPATIENT)
Dept: OUTPATIENT SERVICES | Facility: HOSPITAL | Age: 85
LOS: 1 days | End: 2025-04-14
Payer: MEDICARE

## 2025-04-14 DIAGNOSIS — Z89.421 ACQUIRED ABSENCE OF OTHER RIGHT TOE(S): Chronic | ICD-10-CM

## 2025-04-14 DIAGNOSIS — Z89.511 ACQUIRED ABSENCE OF RIGHT LEG BELOW KNEE: Chronic | ICD-10-CM

## 2025-04-14 DIAGNOSIS — Z98.890 OTHER SPECIFIED POSTPROCEDURAL STATES: Chronic | ICD-10-CM

## 2025-04-14 PROCEDURE — G0463: CPT

## 2025-04-14 PROCEDURE — 99213 OFFICE O/P EST LOW 20 MIN: CPT

## 2025-04-15 DIAGNOSIS — I73.9 PERIPHERAL VASCULAR DISEASE, UNSPECIFIED: ICD-10-CM

## 2025-04-15 DIAGNOSIS — Z89.439 ACQUIRED ABSENCE OF UNSPECIFIED FOOT: ICD-10-CM

## 2025-04-15 DIAGNOSIS — M34.1 CR(E)ST SYNDROME: ICD-10-CM

## 2025-04-21 ENCOUNTER — OUTPATIENT (OUTPATIENT)
Dept: OUTPATIENT SERVICES | Facility: HOSPITAL | Age: 85
LOS: 1 days | End: 2025-04-21
Payer: MEDICARE

## 2025-04-21 ENCOUNTER — APPOINTMENT (OUTPATIENT)
Dept: WOUND CARE | Facility: HOSPITAL | Age: 85
End: 2025-04-21
Payer: MEDICARE

## 2025-04-21 DIAGNOSIS — Z98.890 OTHER SPECIFIED POSTPROCEDURAL STATES: Chronic | ICD-10-CM

## 2025-04-21 DIAGNOSIS — Z89.421 ACQUIRED ABSENCE OF OTHER RIGHT TOE(S): Chronic | ICD-10-CM

## 2025-04-21 DIAGNOSIS — Z89.511 ACQUIRED ABSENCE OF RIGHT LEG BELOW KNEE: Chronic | ICD-10-CM

## 2025-04-21 PROCEDURE — 11042 DBRDMT SUBQ TIS 1ST 20SQCM/<: CPT | Mod: 58

## 2025-04-21 PROCEDURE — 15271 SKIN SUB GRAFT TRNK/ARM/LEG: CPT

## 2025-04-23 DIAGNOSIS — Z89.439 ACQUIRED ABSENCE OF UNSPECIFIED FOOT: ICD-10-CM

## 2025-04-28 ENCOUNTER — APPOINTMENT (OUTPATIENT)
Dept: WOUND CARE | Facility: HOSPITAL | Age: 85
End: 2025-04-28
Payer: MEDICARE

## 2025-04-28 ENCOUNTER — OUTPATIENT (OUTPATIENT)
Dept: OUTPATIENT SERVICES | Facility: HOSPITAL | Age: 85
LOS: 1 days | End: 2025-04-28
Payer: MEDICARE

## 2025-04-28 DIAGNOSIS — Z89.421 ACQUIRED ABSENCE OF OTHER RIGHT TOE(S): Chronic | ICD-10-CM

## 2025-04-28 DIAGNOSIS — Z89.511 ACQUIRED ABSENCE OF RIGHT LEG BELOW KNEE: Chronic | ICD-10-CM

## 2025-04-28 DIAGNOSIS — Z98.890 OTHER SPECIFIED POSTPROCEDURAL STATES: Chronic | ICD-10-CM

## 2025-04-28 PROCEDURE — 11042 DBRDMT SUBQ TIS 1ST 20SQCM/<: CPT | Mod: 58

## 2025-04-28 PROCEDURE — 15271 SKIN SUB GRAFT TRNK/ARM/LEG: CPT

## 2025-05-05 ENCOUNTER — APPOINTMENT (OUTPATIENT)
Dept: WOUND CARE | Facility: HOSPITAL | Age: 85
End: 2025-05-05
Payer: MEDICARE

## 2025-05-05 ENCOUNTER — OUTPATIENT (OUTPATIENT)
Dept: OUTPATIENT SERVICES | Facility: HOSPITAL | Age: 85
LOS: 1 days | End: 2025-05-05
Payer: MEDICARE

## 2025-05-05 DIAGNOSIS — Z98.890 OTHER SPECIFIED POSTPROCEDURAL STATES: Chronic | ICD-10-CM

## 2025-05-05 DIAGNOSIS — Z89.421 ACQUIRED ABSENCE OF OTHER RIGHT TOE(S): Chronic | ICD-10-CM

## 2025-05-05 DIAGNOSIS — Z89.511 ACQUIRED ABSENCE OF RIGHT LEG BELOW KNEE: Chronic | ICD-10-CM

## 2025-05-05 PROCEDURE — 11042 DBRDMT SUBQ TIS 1ST 20SQCM/<: CPT

## 2025-05-06 DIAGNOSIS — M34.1 CR(E)ST SYNDROME: ICD-10-CM

## 2025-05-06 DIAGNOSIS — L97.522 NON-PRESSURE CHRONIC ULCER OF OTHER PART OF LEFT FOOT WITH FAT LAYER EXPOSED: ICD-10-CM

## 2025-05-06 DIAGNOSIS — Z89.439 ACQUIRED ABSENCE OF UNSPECIFIED FOOT: ICD-10-CM

## 2025-05-06 DIAGNOSIS — I73.9 PERIPHERAL VASCULAR DISEASE, UNSPECIFIED: ICD-10-CM

## 2025-05-12 ENCOUNTER — APPOINTMENT (OUTPATIENT)
Dept: WOUND CARE | Facility: HOSPITAL | Age: 85
End: 2025-05-12

## 2025-05-12 ENCOUNTER — OUTPATIENT (OUTPATIENT)
Dept: OUTPATIENT SERVICES | Facility: HOSPITAL | Age: 85
LOS: 1 days | End: 2025-05-12
Payer: MEDICARE

## 2025-05-12 DIAGNOSIS — Z98.890 OTHER SPECIFIED POSTPROCEDURAL STATES: Chronic | ICD-10-CM

## 2025-05-12 DIAGNOSIS — Z89.421 ACQUIRED ABSENCE OF OTHER RIGHT TOE(S): Chronic | ICD-10-CM

## 2025-05-12 DIAGNOSIS — Z89.511 ACQUIRED ABSENCE OF RIGHT LEG BELOW KNEE: Chronic | ICD-10-CM

## 2025-05-12 PROCEDURE — 11042 DBRDMT SUBQ TIS 1ST 20SQCM/<: CPT | Mod: 58

## 2025-05-12 PROCEDURE — 15271 SKIN SUB GRAFT TRNK/ARM/LEG: CPT

## 2025-05-13 DIAGNOSIS — I73.9 PERIPHERAL VASCULAR DISEASE, UNSPECIFIED: ICD-10-CM

## 2025-05-19 ENCOUNTER — APPOINTMENT (OUTPATIENT)
Dept: WOUND CARE | Facility: HOSPITAL | Age: 85
End: 2025-05-19

## 2025-05-19 DIAGNOSIS — T86.828 OTHER COMPLICATIONS OF SKIN GRAFT (ALLOGRAFT) (AUTOGRAFT): ICD-10-CM

## 2025-05-19 DIAGNOSIS — I96 OTHER COMPLICATIONS OF SKIN GRAFT (ALLOGRAFT) (AUTOGRAFT): ICD-10-CM

## 2025-05-19 DIAGNOSIS — I73.9 PERIPHERAL VASCULAR DISEASE, UNSPECIFIED: ICD-10-CM

## 2025-05-19 DIAGNOSIS — M34.1 CR(E)ST SYNDROME: ICD-10-CM

## 2025-05-19 DIAGNOSIS — Z89.439 ACQUIRED ABSENCE OF UNSPECIFIED FOOT: ICD-10-CM

## 2025-05-19 DIAGNOSIS — S88.111D COMPLETE TRAUMATIC AMPUTATION AT LVL BETWEEN KNEE AND ANKLE, RIGHT LOWER LEG, SUBSEQUENT ENCOUNTER: ICD-10-CM

## 2025-05-19 DIAGNOSIS — L97.522 NON-PRESSURE CHRONIC ULCER OF OTHER PART OF LEFT FOOT WITH FAT LAYER EXPOSED: ICD-10-CM

## 2025-05-19 PROCEDURE — 99213 OFFICE O/P EST LOW 20 MIN: CPT

## 2025-05-29 ENCOUNTER — OUTPATIENT (OUTPATIENT)
Dept: OUTPATIENT SERVICES | Facility: HOSPITAL | Age: 85
LOS: 1 days | End: 2025-05-29
Payer: MEDICARE

## 2025-05-29 DIAGNOSIS — Z89.511 ACQUIRED ABSENCE OF RIGHT LEG BELOW KNEE: Chronic | ICD-10-CM

## 2025-05-29 DIAGNOSIS — Z98.890 OTHER SPECIFIED POSTPROCEDURAL STATES: Chronic | ICD-10-CM

## 2025-05-29 PROCEDURE — G0463: CPT

## 2025-06-02 ENCOUNTER — OUTPATIENT (OUTPATIENT)
Dept: OUTPATIENT SERVICES | Facility: HOSPITAL | Age: 85
LOS: 1 days | End: 2025-06-02
Payer: MEDICARE

## 2025-06-02 ENCOUNTER — APPOINTMENT (OUTPATIENT)
Dept: WOUND CARE | Facility: HOSPITAL | Age: 85
End: 2025-06-02

## 2025-06-02 VITALS
HEART RATE: 78 BPM | RESPIRATION RATE: 18 BRPM | WEIGHT: 160 LBS | OXYGEN SATURATION: 98 % | HEIGHT: 66 IN | TEMPERATURE: 98 F | BODY MASS INDEX: 25.71 KG/M2

## 2025-06-02 DIAGNOSIS — S88.111D COMPLETE TRAUMATIC AMPUTATION AT LVL BETWEEN KNEE AND ANKLE, RIGHT LOWER LEG, SUBSEQUENT ENCOUNTER: ICD-10-CM

## 2025-06-02 DIAGNOSIS — T86.828 OTHER COMPLICATIONS OF SKIN GRAFT (ALLOGRAFT) (AUTOGRAFT): ICD-10-CM

## 2025-06-02 DIAGNOSIS — Z98.890 OTHER SPECIFIED POSTPROCEDURAL STATES: Chronic | ICD-10-CM

## 2025-06-02 DIAGNOSIS — I73.9 PERIPHERAL VASCULAR DISEASE, UNSPECIFIED: ICD-10-CM

## 2025-06-02 DIAGNOSIS — M34.1 CR(E)ST SYNDROME: ICD-10-CM

## 2025-06-02 DIAGNOSIS — Z89.511 ACQUIRED ABSENCE OF RIGHT LEG BELOW KNEE: Chronic | ICD-10-CM

## 2025-06-02 DIAGNOSIS — Z89.439 ACQUIRED ABSENCE OF UNSPECIFIED FOOT: ICD-10-CM

## 2025-06-02 DIAGNOSIS — Z89.421 ACQUIRED ABSENCE OF OTHER RIGHT TOE(S): Chronic | ICD-10-CM

## 2025-06-02 DIAGNOSIS — L97.522 NON-PRESSURE CHRONIC ULCER OF OTHER PART OF LEFT FOOT WITH FAT LAYER EXPOSED: ICD-10-CM

## 2025-06-02 DIAGNOSIS — I96 OTHER COMPLICATIONS OF SKIN GRAFT (ALLOGRAFT) (AUTOGRAFT): ICD-10-CM

## 2025-06-02 PROCEDURE — 11042 DBRDMT SUBQ TIS 1ST 20SQCM/<: CPT

## 2025-06-23 ENCOUNTER — OUTPATIENT (OUTPATIENT)
Dept: OUTPATIENT SERVICES | Facility: HOSPITAL | Age: 85
LOS: 1 days | End: 2025-06-23
Payer: MEDICARE

## 2025-06-23 ENCOUNTER — APPOINTMENT (OUTPATIENT)
Dept: WOUND CARE | Facility: HOSPITAL | Age: 85
End: 2025-06-23
Payer: MEDICARE

## 2025-06-23 DIAGNOSIS — Z98.890 OTHER SPECIFIED POSTPROCEDURAL STATES: Chronic | ICD-10-CM

## 2025-06-23 DIAGNOSIS — Z89.421 ACQUIRED ABSENCE OF OTHER RIGHT TOE(S): Chronic | ICD-10-CM

## 2025-06-23 PROCEDURE — 11042 DBRDMT SUBQ TIS 1ST 20SQCM/<: CPT

## 2025-06-24 DIAGNOSIS — I73.9 PERIPHERAL VASCULAR DISEASE, UNSPECIFIED: ICD-10-CM

## 2025-06-24 DIAGNOSIS — L97.522 NON-PRESSURE CHRONIC ULCER OF OTHER PART OF LEFT FOOT WITH FAT LAYER EXPOSED: ICD-10-CM

## 2025-06-24 DIAGNOSIS — Z89.439 ACQUIRED ABSENCE OF UNSPECIFIED FOOT: ICD-10-CM

## 2025-06-24 DIAGNOSIS — M34.1 CR(E)ST SYNDROME: ICD-10-CM

## 2025-06-25 DIAGNOSIS — L97.522 NON-PRESSURE CHRONIC ULCER OF OTHER PART OF LEFT FOOT WITH FAT LAYER EXPOSED: ICD-10-CM

## 2025-06-25 DIAGNOSIS — I96 GANGRENE, NOT ELSEWHERE CLASSIFIED: ICD-10-CM

## 2025-06-25 DIAGNOSIS — I73.9 PERIPHERAL VASCULAR DISEASE, UNSPECIFIED: ICD-10-CM

## 2025-06-25 DIAGNOSIS — Z89.439 ACQUIRED ABSENCE OF UNSPECIFIED FOOT: ICD-10-CM

## 2025-06-25 DIAGNOSIS — T86.828 OTHER COMPLICATIONS OF SKIN GRAFT (ALLOGRAFT) (AUTOGRAFT): ICD-10-CM

## 2025-06-25 DIAGNOSIS — S88.111D COMPLETE TRAUMATIC AMPUTATION AT LEVEL BETWEEN KNEE AND ANKLE, RIGHT LOWER LEG, SUBSEQUENT ENCOUNTER: ICD-10-CM

## 2025-06-25 DIAGNOSIS — M34.1 CR(E)ST SYNDROME: ICD-10-CM

## 2025-07-01 ENCOUNTER — APPOINTMENT (OUTPATIENT)
Dept: PHYSICAL MEDICINE AND REHAB | Facility: CLINIC | Age: 85
End: 2025-07-01
Payer: MEDICARE

## 2025-07-01 PROCEDURE — 99213 OFFICE O/P EST LOW 20 MIN: CPT

## 2025-07-02 DIAGNOSIS — Z89.439 ACQUIRED ABSENCE OF UNSPECIFIED FOOT: ICD-10-CM

## 2025-07-02 DIAGNOSIS — L97.522 NON-PRESSURE CHRONIC ULCER OF OTHER PART OF LEFT FOOT WITH FAT LAYER EXPOSED: ICD-10-CM

## 2025-07-02 DIAGNOSIS — I96 GANGRENE, NOT ELSEWHERE CLASSIFIED: ICD-10-CM

## 2025-07-02 DIAGNOSIS — T86.828 OTHER COMPLICATIONS OF SKIN GRAFT (ALLOGRAFT) (AUTOGRAFT): ICD-10-CM

## 2025-07-02 DIAGNOSIS — S88.111D COMPLETE TRAUMATIC AMPUTATION AT LEVEL BETWEEN KNEE AND ANKLE, RIGHT LOWER LEG, SUBSEQUENT ENCOUNTER: ICD-10-CM

## 2025-07-02 DIAGNOSIS — M34.1 CR(E)ST SYNDROME: ICD-10-CM

## 2025-07-02 DIAGNOSIS — I73.9 PERIPHERAL VASCULAR DISEASE, UNSPECIFIED: ICD-10-CM

## 2025-07-12 ENCOUNTER — NON-APPOINTMENT (OUTPATIENT)
Age: 85
End: 2025-07-12

## 2025-07-14 ENCOUNTER — OUTPATIENT (OUTPATIENT)
Dept: OUTPATIENT SERVICES | Facility: HOSPITAL | Age: 85
LOS: 1 days | End: 2025-07-14
Payer: MEDICARE

## 2025-07-14 ENCOUNTER — APPOINTMENT (OUTPATIENT)
Dept: WOUND CARE | Facility: HOSPITAL | Age: 85
End: 2025-07-14
Payer: MEDICARE

## 2025-07-14 DIAGNOSIS — Z89.511 ACQUIRED ABSENCE OF RIGHT LEG BELOW KNEE: Chronic | ICD-10-CM

## 2025-07-14 DIAGNOSIS — Z98.890 OTHER SPECIFIED POSTPROCEDURAL STATES: Chronic | ICD-10-CM

## 2025-07-14 DIAGNOSIS — Z89.421 ACQUIRED ABSENCE OF OTHER RIGHT TOE(S): Chronic | ICD-10-CM

## 2025-07-14 PROCEDURE — 11042 DBRDMT SUBQ TIS 1ST 20SQCM/<: CPT

## 2025-07-14 RX ORDER — COLLAGENASE SANTYL 250 [ARB'U]/G
250 OINTMENT TOPICAL DAILY
Qty: 1 | Refills: 3 | Status: ACTIVE | COMMUNITY
Start: 2025-07-14 | End: 1900-01-01

## 2025-07-31 DIAGNOSIS — I73.9 PERIPHERAL VASCULAR DISEASE, UNSPECIFIED: ICD-10-CM

## 2025-07-31 DIAGNOSIS — M34.1 CR(E)ST SYNDROME: ICD-10-CM

## 2025-07-31 DIAGNOSIS — L97.522 NON-PRESSURE CHRONIC ULCER OF OTHER PART OF LEFT FOOT WITH FAT LAYER EXPOSED: ICD-10-CM

## 2025-08-04 ENCOUNTER — APPOINTMENT (OUTPATIENT)
Dept: WOUND CARE | Facility: HOSPITAL | Age: 85
End: 2025-08-04
Payer: MEDICARE

## 2025-08-04 ENCOUNTER — OUTPATIENT (OUTPATIENT)
Dept: OUTPATIENT SERVICES | Facility: HOSPITAL | Age: 85
LOS: 1 days | End: 2025-08-04
Payer: MEDICARE

## 2025-08-04 DIAGNOSIS — L97.522 NON-PRESSURE CHRONIC ULCER OF OTHER PART OF LEFT FOOT WITH FAT LAYER EXPOSED: ICD-10-CM

## 2025-08-04 DIAGNOSIS — M34.1 CR(E)ST SYNDROME: ICD-10-CM

## 2025-08-04 DIAGNOSIS — I73.9 PERIPHERAL VASCULAR DISEASE, UNSPECIFIED: ICD-10-CM

## 2025-08-04 DIAGNOSIS — Z98.890 OTHER SPECIFIED POSTPROCEDURAL STATES: Chronic | ICD-10-CM

## 2025-08-04 DIAGNOSIS — Z89.421 ACQUIRED ABSENCE OF OTHER RIGHT TOE(S): Chronic | ICD-10-CM

## 2025-08-04 DIAGNOSIS — Z89.511 ACQUIRED ABSENCE OF RIGHT LEG BELOW KNEE: Chronic | ICD-10-CM

## 2025-08-04 PROCEDURE — 11042 DBRDMT SUBQ TIS 1ST 20SQCM/<: CPT

## 2025-08-13 ENCOUNTER — NON-APPOINTMENT (OUTPATIENT)
Age: 85
End: 2025-08-13

## 2025-08-16 ENCOUNTER — NON-APPOINTMENT (OUTPATIENT)
Age: 85
End: 2025-08-16

## 2025-08-20 DIAGNOSIS — L97.522 NON-PRESSURE CHRONIC ULCER OF OTHER PART OF LEFT FOOT WITH FAT LAYER EXPOSED: ICD-10-CM

## 2025-08-20 DIAGNOSIS — M34.1 CR(E)ST SYNDROME: ICD-10-CM

## 2025-08-20 DIAGNOSIS — I73.9 PERIPHERAL VASCULAR DISEASE, UNSPECIFIED: ICD-10-CM

## 2025-08-25 ENCOUNTER — APPOINTMENT (OUTPATIENT)
Dept: WOUND CARE | Facility: HOSPITAL | Age: 85
End: 2025-08-25
Payer: MEDICARE

## 2025-08-25 ENCOUNTER — OUTPATIENT (OUTPATIENT)
Dept: OUTPATIENT SERVICES | Facility: HOSPITAL | Age: 85
LOS: 1 days | End: 2025-08-25
Payer: MEDICARE

## 2025-08-25 VITALS
OXYGEN SATURATION: 97 % | HEART RATE: 73 BPM | RESPIRATION RATE: 17 BRPM | TEMPERATURE: 97.9 F | SYSTOLIC BLOOD PRESSURE: 113 MMHG | DIASTOLIC BLOOD PRESSURE: 73 MMHG

## 2025-08-25 DIAGNOSIS — Z98.890 OTHER SPECIFIED POSTPROCEDURAL STATES: Chronic | ICD-10-CM

## 2025-08-25 DIAGNOSIS — I73.9 PERIPHERAL VASCULAR DISEASE, UNSPECIFIED: ICD-10-CM

## 2025-08-25 DIAGNOSIS — L97.522 NON-PRESSURE CHRONIC ULCER OF OTHER PART OF LEFT FOOT WITH FAT LAYER EXPOSED: ICD-10-CM

## 2025-08-25 DIAGNOSIS — Z89.421 ACQUIRED ABSENCE OF OTHER RIGHT TOE(S): Chronic | ICD-10-CM

## 2025-08-25 DIAGNOSIS — Z89.511 ACQUIRED ABSENCE OF RIGHT LEG BELOW KNEE: Chronic | ICD-10-CM

## 2025-08-25 DIAGNOSIS — M34.1 CR(E)ST SYNDROME: ICD-10-CM

## 2025-08-25 PROCEDURE — 11042 DBRDMT SUBQ TIS 1ST 20SQCM/<: CPT

## 2025-08-29 ENCOUNTER — NON-APPOINTMENT (OUTPATIENT)
Age: 85
End: 2025-08-29

## 2025-09-04 DIAGNOSIS — M34.1 CR(E)ST SYNDROME: ICD-10-CM

## 2025-09-04 DIAGNOSIS — I73.9 PERIPHERAL VASCULAR DISEASE, UNSPECIFIED: ICD-10-CM

## 2025-09-04 DIAGNOSIS — L97.522 NON-PRESSURE CHRONIC ULCER OF OTHER PART OF LEFT FOOT WITH FAT LAYER EXPOSED: ICD-10-CM

## 2025-09-11 ENCOUNTER — NON-APPOINTMENT (OUTPATIENT)
Age: 85
End: 2025-09-11

## 2025-09-11 DIAGNOSIS — Z63.4 DISAPPEARANCE AND DEATH OF FAMILY MEMBER: ICD-10-CM

## 2025-09-11 DIAGNOSIS — M86.9 TYPE 2 DIABETES MELLITUS WITH FOOT ULCER: ICD-10-CM

## 2025-09-11 DIAGNOSIS — E11.621 TYPE 2 DIABETES MELLITUS WITH FOOT ULCER: ICD-10-CM

## 2025-09-11 DIAGNOSIS — E11.69 TYPE 2 DIABETES MELLITUS WITH FOOT ULCER: ICD-10-CM

## 2025-09-11 DIAGNOSIS — L97.509 TYPE 2 DIABETES MELLITUS WITH FOOT ULCER: ICD-10-CM

## 2025-09-11 DIAGNOSIS — M86.672 OTHER CHRONIC OSTEOMYELITIS, LEFT ANKLE AND FOOT: ICD-10-CM

## 2025-09-11 DIAGNOSIS — E11.9 TYPE 2 DIABETES MELLITUS W/OUT COMPLICATIONS: ICD-10-CM

## 2025-09-11 SDOH — SOCIAL STABILITY - SOCIAL INSECURITY: DISSAPEARANCE AND DEATH OF FAMILY MEMBER: Z63.4

## (undated) DEVICE — SAW BLADE MICROAIRE OSCILATING 25.4MM X 90MM X 1.27MM

## (undated) DEVICE — SYR LUER LOK 10CC

## (undated) DEVICE — PACKING GAUZE PLAIN 2"

## (undated) DEVICE — DRAPE 3/4 SHEET W REINFORCEMENT 56X77"

## (undated) DEVICE — MARKING PEN W RULER

## (undated) DEVICE — PACKING GAUZE PLAIN 0.5"

## (undated) DEVICE — WARMING BLANKET UPPER ADULT

## (undated) DEVICE — DRSG COMBINE 5 X 9"

## (undated) DEVICE — LABELS BLANK W PEN

## (undated) DEVICE — SOL IRR POUR NS 0.9% 500ML

## (undated) DEVICE — DRSG STOCKINETTE IMPERVIOUS XL 12 X 48"

## (undated) DEVICE — STAPLER SKIN VISI-STAT 35 WIDE

## (undated) DEVICE — SAW BLADE MICROAIRE SAGITTAL 9.4MMX25.4MMX0.6MM

## (undated) DEVICE — DRSG KLING 4"

## (undated) DEVICE — DRSG ACE BANDAGE 4" NS

## (undated) DEVICE — PACK LIJ BASIC ORTHO

## (undated) DEVICE — TOURNIQUET ESMARK 4"

## (undated) DEVICE — ELCTR BOVIE TIP BLADE INSULATED 4" EDGE

## (undated) DEVICE — SUT MONOCRYL 4-0 27" PS-2 UNDYED

## (undated) DEVICE — SPECIMEN CONTAINER 100ML

## (undated) DEVICE — POSITIONER FOAM EGG CRATE ULNAR 2PCS (PINK)

## (undated) DEVICE — PACK EXTREMITY

## (undated) DEVICE — GLV 7.5 PROTEXIS (WHITE)

## (undated) DEVICE — ELCTR GROUNDING PAD ADULT COVIDIEN

## (undated) DEVICE — BLADE SURGICAL #15 CARBON

## (undated) DEVICE — DRAPE LIGHT HANDLE COVER (BLUE)

## (undated) DEVICE — ELCTR ROCKER SWITCH PENCIL BLUE 10FT

## (undated) DEVICE — DRSG XEROFORM 1 X 8"

## (undated) DEVICE — DRAPE INSTRUMENT POUCH 6.75" X 11"

## (undated) DEVICE — VENODYNE/SCD SLEEVE CALF MEDIUM

## (undated) DEVICE — SOL IRR POUR H2O 500ML

## (undated) DEVICE — DRAPE C ARM MINI

## (undated) DEVICE — DRAPE TOWEL BLUE 17" X 24"

## (undated) DEVICE — POSITIONER PATIENT SAFETY STRAP 3X60"

## (undated) DEVICE — PREP BETADINE KIT

## (undated) DEVICE — DRAPE ISOLATION BAG 20X20"

## (undated) DEVICE — SUT PLAIN GUT 4-0 18" P-12

## (undated) DEVICE — STRYKER PULSE LAVAGE WITH HIGH FLOW TIP

## (undated) DEVICE — DRSG STERISTRIPS 0.25 X 3"

## (undated) DEVICE — DRAPE 1/2 SHEET 40X57"

## (undated) DEVICE — NDL HYPO REGULAR BEVEL 25G X 1.5" (BLUE)

## (undated) DEVICE — DRSG ADAPTIC CURITY OIL EMULSION 3 X 8"

## (undated) DEVICE — NDL HYPO SAFE 18G X 1.5" (PINK)

## (undated) DEVICE — DRAPE MAGNETIC INSTRUMENT MEDIUM

## (undated) DEVICE — DRSG ACE BANDAGE 6"

## (undated) DEVICE — DRSG SPLINT ROLL ORTHO GLASS SYSTEM 4"X15'

## (undated) DEVICE — PREP CHLORAPREP HI-LITE ORANGE 26ML

## (undated) DEVICE — SUT VICRYL 3-0 18" PS-2 UNDYED

## (undated) DEVICE — BLADE SCALPEL SAFETYLOCK #15

## (undated) DEVICE — FRAZIER SUCTION TIP 8FR

## (undated) DEVICE — DRSG STERISTRIPS 0.5 X 4"

## (undated) DEVICE — BUR STRYKER OVAL SOLID CARBIDE MED 4MM

## (undated) DEVICE — DRSG MASTISOL

## (undated) DEVICE — LAP PAD 18 X 18"

## (undated) DEVICE — DRSG STOCKINETTE IMPERVIOUS MED 8 X 38"

## (undated) DEVICE — FOLEY TRAY 16FR 5CC LTX UMETER CLOSED

## (undated) DEVICE — MEDICATION LABELS W MARKER

## (undated) DEVICE — DRSG COBAN 4"

## (undated) DEVICE — SOL IRR POUR H2O 250ML

## (undated) DEVICE — GLV 8 PROTEXIS (WHITE)

## (undated) DEVICE — DRSG STOCKINETTE TUBULAR 6"

## (undated) DEVICE — PACKING GAUZE IODOFORM 2"

## (undated) DEVICE — TOURNIQUET CUFF 18" DUAL PORT SINGLE BLADDER LUER LOCK (BLACK)

## (undated) DEVICE — SUT POLYSORB 2-0 30" GS-21 UNDYED

## (undated) DEVICE — SAW BLADE MICROAIRE SAGITTAL 5.8X25.4X0.6 MM